# Patient Record
Sex: MALE | Race: WHITE | Employment: FULL TIME | URBAN - METROPOLITAN AREA
[De-identification: names, ages, dates, MRNs, and addresses within clinical notes are randomized per-mention and may not be internally consistent; named-entity substitution may affect disease eponyms.]

---

## 2017-02-03 ENCOUNTER — OFFICE VISIT (OUTPATIENT)
Dept: INTERNAL MEDICINE CLINIC | Age: 62
End: 2017-02-03

## 2017-02-03 VITALS
HEIGHT: 74 IN | BODY MASS INDEX: 33.62 KG/M2 | SYSTOLIC BLOOD PRESSURE: 138 MMHG | DIASTOLIC BLOOD PRESSURE: 80 MMHG | OXYGEN SATURATION: 98 % | WEIGHT: 262 LBS | HEART RATE: 67 BPM

## 2017-02-03 DIAGNOSIS — E11.9 TYPE 2 DIABETES MELLITUS WITHOUT COMPLICATION, WITHOUT LONG-TERM CURRENT USE OF INSULIN (HCC): ICD-10-CM

## 2017-02-03 DIAGNOSIS — E78.5 HYPERLIPIDEMIA, UNSPECIFIED HYPERLIPIDEMIA TYPE: ICD-10-CM

## 2017-02-03 DIAGNOSIS — G47.33 OSA ON CPAP: ICD-10-CM

## 2017-02-03 DIAGNOSIS — I82.492 ACUTE DEEP VEIN THROMBOSIS (DVT) OF OTHER SPECIFIED VEIN OF LEFT LOWER EXTREMITY (HCC): Primary | ICD-10-CM

## 2017-02-03 DIAGNOSIS — E66.9 OBESITY (BMI 30-39.9): ICD-10-CM

## 2017-02-03 DIAGNOSIS — Z99.89 OSA ON CPAP: ICD-10-CM

## 2017-02-03 PROCEDURE — 99214 OFFICE O/P EST MOD 30 MIN: CPT | Performed by: INTERNAL MEDICINE

## 2017-05-02 ENCOUNTER — OFFICE VISIT (OUTPATIENT)
Dept: INTERNAL MEDICINE CLINIC | Age: 62
End: 2017-05-02

## 2017-05-02 VITALS
WEIGHT: 264 LBS | DIASTOLIC BLOOD PRESSURE: 78 MMHG | BODY MASS INDEX: 33.88 KG/M2 | HEART RATE: 79 BPM | HEIGHT: 74 IN | SYSTOLIC BLOOD PRESSURE: 122 MMHG | OXYGEN SATURATION: 98 %

## 2017-05-02 DIAGNOSIS — Z13.9 SCREENING: ICD-10-CM

## 2017-05-02 DIAGNOSIS — E66.9 OBESITY (BMI 30-39.9): ICD-10-CM

## 2017-05-02 DIAGNOSIS — E11.9 TYPE 2 DIABETES MELLITUS WITHOUT COMPLICATION, WITHOUT LONG-TERM CURRENT USE OF INSULIN (HCC): ICD-10-CM

## 2017-05-02 DIAGNOSIS — I82.492 ACUTE DEEP VEIN THROMBOSIS (DVT) OF OTHER SPECIFIED VEIN OF LEFT LOWER EXTREMITY (HCC): Primary | ICD-10-CM

## 2017-05-02 DIAGNOSIS — E78.5 HYPERLIPIDEMIA, UNSPECIFIED HYPERLIPIDEMIA TYPE: ICD-10-CM

## 2017-05-02 PROCEDURE — 99214 OFFICE O/P EST MOD 30 MIN: CPT | Performed by: INTERNAL MEDICINE

## 2017-05-24 DIAGNOSIS — R73.01 IFG (IMPAIRED FASTING GLUCOSE): ICD-10-CM

## 2017-05-24 RX ORDER — APIXABAN 5 MG/1
TABLET, FILM COATED ORAL
Qty: 60 TABLET | Refills: 0 | Status: SHIPPED | OUTPATIENT
Start: 2017-05-24 | End: 2018-05-15 | Stop reason: ALTCHOICE

## 2017-10-24 ENCOUNTER — OFFICE VISIT (OUTPATIENT)
Dept: INTERNAL MEDICINE CLINIC | Age: 62
End: 2017-10-24

## 2017-10-24 VITALS
DIASTOLIC BLOOD PRESSURE: 68 MMHG | WEIGHT: 245 LBS | OXYGEN SATURATION: 98 % | SYSTOLIC BLOOD PRESSURE: 136 MMHG | HEART RATE: 58 BPM | BODY MASS INDEX: 31.44 KG/M2 | HEIGHT: 74 IN

## 2017-10-24 DIAGNOSIS — I82.492 ACUTE DEEP VEIN THROMBOSIS (DVT) OF OTHER SPECIFIED VEIN OF LEFT LOWER EXTREMITY (HCC): ICD-10-CM

## 2017-10-24 DIAGNOSIS — Z23 NEED FOR IMMUNIZATION AGAINST INFLUENZA: ICD-10-CM

## 2017-10-24 DIAGNOSIS — E11.9 TYPE 2 DIABETES MELLITUS WITHOUT COMPLICATION, WITHOUT LONG-TERM CURRENT USE OF INSULIN (HCC): Primary | ICD-10-CM

## 2017-10-24 DIAGNOSIS — E78.5 HYPERLIPIDEMIA, UNSPECIFIED HYPERLIPIDEMIA TYPE: ICD-10-CM

## 2017-10-24 DIAGNOSIS — G47.33 OSA ON CPAP: ICD-10-CM

## 2017-10-24 DIAGNOSIS — E66.9 CLASS 1 OBESITY WITHOUT SERIOUS COMORBIDITY WITH BODY MASS INDEX (BMI) OF 31.0 TO 31.9 IN ADULT, UNSPECIFIED OBESITY TYPE: ICD-10-CM

## 2017-10-24 DIAGNOSIS — Z99.89 OSA ON CPAP: ICD-10-CM

## 2017-10-24 PROCEDURE — 90688 IIV4 VACCINE SPLT 0.5 ML IM: CPT | Performed by: INTERNAL MEDICINE

## 2017-10-24 PROCEDURE — 99214 OFFICE O/P EST MOD 30 MIN: CPT | Performed by: INTERNAL MEDICINE

## 2017-10-24 PROCEDURE — 90471 IMMUNIZATION ADMIN: CPT | Performed by: INTERNAL MEDICINE

## 2017-10-24 ASSESSMENT — PATIENT HEALTH QUESTIONNAIRE - PHQ9
SUM OF ALL RESPONSES TO PHQ9 QUESTIONS 1 & 2: 0
SUM OF ALL RESPONSES TO PHQ QUESTIONS 1-9: 0
2. FEELING DOWN, DEPRESSED OR HOPELESS: 0
1. LITTLE INTEREST OR PLEASURE IN DOING THINGS: 0

## 2017-10-24 NOTE — PROGRESS NOTES
Subjective:      Patient ID: Helena Ford is a 60 y.o. male. CC: Recheck diabetes  HPI: Patient with history of DVT now off Eliquis, DAQUAN on CPAP, DM 2, obesity. Obesity has improved with 19 pound weight loss since last office visit 5/2/2017. Concerning diabetes glucose fingerstick in the morning fasting before breakfast average 120 last  Medicines and allergies reviewed  Health maintenance review  Reviewed laboratory data 10/21/2017: Chemistry panel: Glucose: 111. Lipid panel: Total cholesterol: 169. LDL cholesterol: 107. Hemoglobin A1 C 5.4. PSA: 0.4. Review of Systems    Review of Systems   Constitutional: negative   HENT: negative   EYES: negative   Respiratory: negative   Gastrointestinal: negative   Endocrine: negative   Musculoskeletal: negative   Skin: negative   Allergic/Immunological: negative   Hematological: negative   Psychiatric/Behavorial: negative   CV: negative   CNS: negative   :Negative   S/E:Negative  Renal: Negative      Objective:   Physical Exam: Lungs: Clear to auscultation. CV: S1-S2 normal.  ARSALAN. Carotid: No bruit. Head/neck: Neck: No lymphadenopathy. Thyroid: Not palpable. Eyes: EOMI, PERRLA without nystagnus. Spine/extremities: Left lower extremity with mild edema. Skin: No rash. Diabetic foot exam: Pulses easily palpable. No edema. No open area. Light touch and monofilament test normal.  Blood pressure 136/68, pulse 58, height 6' 2.25\" (1.886 m), weight 245 lb (111.1 kg), SpO2 98 %. Assessment:      1.  DM 2: Improved  2. Obesity: Weight loss: Improved as noted above  3. DAQUAN on CPAP: Stable  4. DVT  has been treated with Eliquis now off the medicine and doing well. Plan:     1. Influenza vaccine given today after discussing risk and benefits  2. Microalbumin obtained  3. Continue low sugar, low carb, weight reduction diet  4.   Gave slip to obtain fasting laboratory studies before next office visit  Return follow-up  Dr. penny

## 2017-12-13 ENCOUNTER — OFFICE VISIT (OUTPATIENT)
Dept: PULMONOLOGY | Age: 62
End: 2017-12-13

## 2017-12-13 VITALS
TEMPERATURE: 97.3 F | SYSTOLIC BLOOD PRESSURE: 120 MMHG | OXYGEN SATURATION: 99 % | HEART RATE: 63 BPM | DIASTOLIC BLOOD PRESSURE: 70 MMHG | BODY MASS INDEX: 30.16 KG/M2 | WEIGHT: 235 LBS | HEIGHT: 74 IN | RESPIRATION RATE: 16 BRPM

## 2017-12-13 DIAGNOSIS — G47.33 OSA ON CPAP: Primary | ICD-10-CM

## 2017-12-13 DIAGNOSIS — Z99.89 OSA ON CPAP: Primary | ICD-10-CM

## 2017-12-13 DIAGNOSIS — Z71.89 CPAP USE COUNSELING: ICD-10-CM

## 2017-12-13 PROCEDURE — 99213 OFFICE O/P EST LOW 20 MIN: CPT | Performed by: NURSE PRACTITIONER

## 2017-12-13 ASSESSMENT — SLEEP AND FATIGUE QUESTIONNAIRES
HOW LIKELY ARE YOU TO NOD OFF OR FALL ASLEEP WHILE LYING DOWN TO REST IN THE AFTERNOON WHEN CIRCUMSTANCES PERMIT: 3
HOW LIKELY ARE YOU TO NOD OFF OR FALL ASLEEP WHEN YOU ARE A PASSENGER IN A CAR FOR AN HOUR WITHOUT A BREAK: 0
HOW LIKELY ARE YOU TO NOD OFF OR FALL ASLEEP WHILE SITTING QUIETLY AFTER LUNCH WITHOUT ALCOHOL: 0
ESS TOTAL SCORE: 3
NECK CIRCUMFERENCE (INCHES): 17.5
HOW LIKELY ARE YOU TO NOD OFF OR FALL ASLEEP IN A CAR, WHILE STOPPED FOR A FEW MINUTES IN TRAFFIC: 0
HOW LIKELY ARE YOU TO NOD OFF OR FALL ASLEEP WHILE WATCHING TV: 0
HOW LIKELY ARE YOU TO NOD OFF OR FALL ASLEEP WHILE SITTING INACTIVE IN A PUBLIC PLACE: 0
HOW LIKELY ARE YOU TO NOD OFF OR FALL ASLEEP WHILE SITTING AND TALKING TO SOMEONE: 0
HOW LIKELY ARE YOU TO NOD OFF OR FALL ASLEEP WHILE SITTING AND READING: 0

## 2017-12-13 NOTE — PATIENT INSTRUCTIONS
Please keep all of your future appointments scheduled by [address], [facility] Pulmonary and Sleep. You should have a follow up appointment scheduled with a sleep medicine provider within 12 months. Routine parts, masks, tubing and filters should all be obtainable from your DME (equipment) provider. Any problems related to mask fit, comfort or functioning of equipment should also be addressed directly with your DME provider. If you are unable to resolve problems, then please notify our office and schedule an appointment to be seen sooner than the usual follow up appointment. For all patients who are evaluated for sleep disorders, never drive or operate motorized equipment while sleepy, fatigued or groggy. Please bring in all of your sleep equipment to all of your appointments, including machine, mask, cords and hoses. Here are some tips to to getting better sleep  1- Avoid napping during the day: This will ensure you are tired at bedtime. If you have to take a nap, sleep less than one hour, before 3 pm.   2- Exercise regularly, but not right before bed: but the timing of the workout is important. Exercising in the morning or early afternoon will not interfere with sleep. Exercising within two hours before bedtime can decrease your ability to fall asleep. Regular exercise is recommended to help you deepen the sleep. 3- Avoid heavy, spicy, or sugary foods 4-6 hours before bedtime: These can affect your ability to stay asleep. 4- Have a light snack before bed: Having an empty stomach can interfere with your sleep. Dairy products and turkey contain tryptophan, which acts as a natural sleep inducer. 5- Stay away from caffeine, nicotine and alcohol at least 4-6 hours before bed: Caffeine and nicotine are stimulants that interfere with your ability to fall asleep.  While alcohol has an immediate sleep-inducing effect, a few hours later, as alcohol levels in your blood start to fall, there is a stimulant effect and you will experience fragmented sleep. 6- Take a hot bath 90 minutes before bedtime:  A hot bath will raise your body temperature, but it is the drop in body temperature that may leave you feeling sleepy  7- Develop sleep rituals: it is important to give your body cues that it is time to slow down and sleep. Listen to relaxing music, read something soothing for 15 minutes, have a cup of caffeine free tea, or do relaxation exercises such as yoga or deep breathing help relieve anxiety and reduce muscle tension. 8- Fix a bedtime and an awakening time: Even on weekends! When your sleep cycle has a regular rhythm, you will feel better. 9- Sleep only when sleepy: This reduces the time you are awake in bed. 10- Get into your favorite sleeping position: If you can't fall asleep within 15-30 minutes, get up and do something boring until you feel sleepy. Sit quietly in the dark or read the warranty on your refrigerator. Don't expose yourself to bright light while you are up, it gives cues to your brain that it is time to wake up. 11- Only use your bed for sleeping: Dont use the bed as an office, workroom or recreation room. Let your body \"know\" that the bed is associated with sleeping  12- Use comfortable bedding. Uncomfortable bedding can prevent good sleep. Evaluate whether or not this is a source of your problem, and make appropriate changes. 13- Make sure your bed and bedroom are quiet and comfortable: A hot room can be uncomfortable. A cooler room, along with enough blankets to stay warm is recommended. Get a blackout shade or wear a slumber mask and wear earplugs or get a \"white noise\" machine for light and noise distractions. 14- Use sunlight to set your biological clock: When you get up in the morning, go outside and turn your face to the sun for 15 minutes. 13- Dont take your worries to bed: Leave worries about job, school, daily life, etc., behind when you go to bed.  Some people find it useful to assign a \"worry period\" during the evening or afternoon for these issues. CPAP Equipment Cleaning and Disinfecting Schedule  Equipment Cleaning Frequency Instructions  Disinfecting Frequency   Non-Disposable Filters  Weekly Mild soapy water, Rinse, Air Dry Not Required   Disposable Filters Change as needed  2-4 weeks Do Not Wash Not Required   Hose/tubing Daily Mild soapy water, Rinse, Air Dry Once a week   Mask / Nasal Pillows Daily Mild soapy water, Rinse, Air Dry Once a week   Headgear Weekly Hand wash, Mild soapy water, Rinse, Dry  Not Required   Humidifier Daily Empty water daily  Mild soapy water, Rinse well, Air Dry  Once a week   CPAP Unit As Needed Dust with damp cloth,  No detergents or sprays Not Required         Disinfect (per schedule) with 1 part white vinegar and 3 parts water- soak mask and water chamber for 30 minutes every 1-2 weeks, more often if sick. Allow water/vinegar mixture to run through tubing. Allow all equipment to air dry. Drying Hints:   Always hang tubing away from direct sunlight, as this will cause the tubing to become yellow, brittle and crack over a period of time. DO NOT attach the wet tubing to your CPAP unit to blow-dry it. The moisture from the tubing can drain back into your machine. Moisture in your unit can cause sudden pressure increases or short circuits  DO's and DON'Ts:  - Don't use alcohol-based products to clean your mask, because it can cause the materials to become hard and brittle. - Don't put headgear in the washer or dryer  - Don't use any caustic or household cleaning solutions such as bleach on your CPAP   equipment.  - Do follow the recommended cleaning schedule. - Do change your disposable filter frequently. Adapted From: [email or web address]. shtm.   These are general suggestions for all models please follow specific s recommendations and specific instructions

## 2018-05-15 ENCOUNTER — OFFICE VISIT (OUTPATIENT)
Dept: INTERNAL MEDICINE CLINIC | Age: 63
End: 2018-05-15

## 2018-05-15 VITALS
BODY MASS INDEX: 30.48 KG/M2 | SYSTOLIC BLOOD PRESSURE: 126 MMHG | DIASTOLIC BLOOD PRESSURE: 64 MMHG | OXYGEN SATURATION: 98 % | WEIGHT: 239 LBS | HEART RATE: 74 BPM

## 2018-05-15 DIAGNOSIS — Z12.5 SPECIAL SCREENING FOR MALIGNANT NEOPLASM OF PROSTATE: ICD-10-CM

## 2018-05-15 DIAGNOSIS — E78.5 HYPERLIPIDEMIA, UNSPECIFIED HYPERLIPIDEMIA TYPE: ICD-10-CM

## 2018-05-15 DIAGNOSIS — E66.9 CLASS 1 OBESITY WITHOUT SERIOUS COMORBIDITY WITH BODY MASS INDEX (BMI) OF 31.0 TO 31.9 IN ADULT, UNSPECIFIED OBESITY TYPE: ICD-10-CM

## 2018-05-15 DIAGNOSIS — E11.9 TYPE 2 DIABETES MELLITUS WITHOUT COMPLICATION, WITHOUT LONG-TERM CURRENT USE OF INSULIN (HCC): Primary | ICD-10-CM

## 2018-05-15 DIAGNOSIS — G47.33 OSA ON CPAP: ICD-10-CM

## 2018-05-15 DIAGNOSIS — Z99.89 OSA ON CPAP: ICD-10-CM

## 2018-05-15 LAB
CREATININE URINE POCT: NORMAL
MICROALBUMIN/CREAT 24H UR: 20 MG/G{CREAT}
MICROALBUMIN/CREAT UR-RTO: NORMAL

## 2018-05-15 PROCEDURE — 3044F HG A1C LEVEL LT 7.0%: CPT | Performed by: INTERNAL MEDICINE

## 2018-05-15 PROCEDURE — G8417 CALC BMI ABV UP PARAM F/U: HCPCS | Performed by: INTERNAL MEDICINE

## 2018-05-15 PROCEDURE — 82044 UR ALBUMIN SEMIQUANTITATIVE: CPT | Performed by: INTERNAL MEDICINE

## 2018-05-15 PROCEDURE — 99213 OFFICE O/P EST LOW 20 MIN: CPT | Performed by: INTERNAL MEDICINE

## 2018-05-15 PROCEDURE — 2022F DILAT RTA XM EVC RTNOPTHY: CPT | Performed by: INTERNAL MEDICINE

## 2018-05-15 PROCEDURE — 3017F COLORECTAL CA SCREEN DOC REV: CPT | Performed by: INTERNAL MEDICINE

## 2018-05-15 PROCEDURE — 1036F TOBACCO NON-USER: CPT | Performed by: INTERNAL MEDICINE

## 2018-05-15 PROCEDURE — G8427 DOCREV CUR MEDS BY ELIG CLIN: HCPCS | Performed by: INTERNAL MEDICINE

## 2018-05-15 RX ORDER — MULTIVITAMIN WITH IRON
250 TABLET ORAL DAILY
COMMUNITY

## 2018-06-28 ENCOUNTER — TELEPHONE (OUTPATIENT)
Dept: INTERNAL MEDICINE CLINIC | Age: 63
End: 2018-06-28

## 2018-06-28 DIAGNOSIS — E11.9 TYPE 2 DIABETES MELLITUS WITHOUT COMPLICATION, WITHOUT LONG-TERM CURRENT USE OF INSULIN (HCC): ICD-10-CM

## 2019-01-07 ENCOUNTER — OFFICE VISIT (OUTPATIENT)
Dept: INTERNAL MEDICINE CLINIC | Age: 64
End: 2019-01-07
Payer: COMMERCIAL

## 2019-01-07 VITALS
DIASTOLIC BLOOD PRESSURE: 72 MMHG | HEART RATE: 73 BPM | BODY MASS INDEX: 32.98 KG/M2 | SYSTOLIC BLOOD PRESSURE: 124 MMHG | OXYGEN SATURATION: 98 % | HEIGHT: 74 IN | WEIGHT: 257 LBS

## 2019-01-07 DIAGNOSIS — E66.9 CLASS 1 OBESITY WITHOUT SERIOUS COMORBIDITY WITH BODY MASS INDEX (BMI) OF 31.0 TO 31.9 IN ADULT, UNSPECIFIED OBESITY TYPE: ICD-10-CM

## 2019-01-07 DIAGNOSIS — Z23 NEED FOR PROPHYLACTIC VACCINATION AND INOCULATION AGAINST INFLUENZA: ICD-10-CM

## 2019-01-07 DIAGNOSIS — Z99.89 OSA ON CPAP: ICD-10-CM

## 2019-01-07 DIAGNOSIS — E11.9 TYPE 2 DIABETES MELLITUS WITHOUT COMPLICATION, WITHOUT LONG-TERM CURRENT USE OF INSULIN (HCC): Primary | ICD-10-CM

## 2019-01-07 DIAGNOSIS — G47.33 OSA ON CPAP: ICD-10-CM

## 2019-01-07 DIAGNOSIS — E78.5 HYPERLIPIDEMIA, UNSPECIFIED HYPERLIPIDEMIA TYPE: ICD-10-CM

## 2019-01-07 PROCEDURE — 2022F DILAT RTA XM EVC RTNOPTHY: CPT | Performed by: INTERNAL MEDICINE

## 2019-01-07 PROCEDURE — 90686 IIV4 VACC NO PRSV 0.5 ML IM: CPT | Performed by: INTERNAL MEDICINE

## 2019-01-07 PROCEDURE — G8417 CALC BMI ABV UP PARAM F/U: HCPCS | Performed by: INTERNAL MEDICINE

## 2019-01-07 PROCEDURE — 3017F COLORECTAL CA SCREEN DOC REV: CPT | Performed by: INTERNAL MEDICINE

## 2019-01-07 PROCEDURE — 90471 IMMUNIZATION ADMIN: CPT | Performed by: INTERNAL MEDICINE

## 2019-01-07 PROCEDURE — G8427 DOCREV CUR MEDS BY ELIG CLIN: HCPCS | Performed by: INTERNAL MEDICINE

## 2019-01-07 PROCEDURE — G8482 FLU IMMUNIZE ORDER/ADMIN: HCPCS | Performed by: INTERNAL MEDICINE

## 2019-01-07 PROCEDURE — 99214 OFFICE O/P EST MOD 30 MIN: CPT | Performed by: INTERNAL MEDICINE

## 2019-01-07 PROCEDURE — 1036F TOBACCO NON-USER: CPT | Performed by: INTERNAL MEDICINE

## 2019-01-07 PROCEDURE — 3044F HG A1C LEVEL LT 7.0%: CPT | Performed by: INTERNAL MEDICINE

## 2019-01-07 ASSESSMENT — PATIENT HEALTH QUESTIONNAIRE - PHQ9
SUM OF ALL RESPONSES TO PHQ QUESTIONS 1-9: 0
2. FEELING DOWN, DEPRESSED OR HOPELESS: 0
1. LITTLE INTEREST OR PLEASURE IN DOING THINGS: 0
SUM OF ALL RESPONSES TO PHQ9 QUESTIONS 1 & 2: 0
SUM OF ALL RESPONSES TO PHQ QUESTIONS 1-9: 0

## 2019-01-08 ENCOUNTER — TELEPHONE (OUTPATIENT)
Dept: PULMONOLOGY | Age: 64
End: 2019-01-08

## 2019-07-08 ENCOUNTER — OFFICE VISIT (OUTPATIENT)
Dept: INTERNAL MEDICINE CLINIC | Age: 64
End: 2019-07-08
Payer: COMMERCIAL

## 2019-07-08 VITALS
HEART RATE: 63 BPM | SYSTOLIC BLOOD PRESSURE: 122 MMHG | HEIGHT: 73 IN | OXYGEN SATURATION: 98 % | BODY MASS INDEX: 32.74 KG/M2 | DIASTOLIC BLOOD PRESSURE: 78 MMHG | WEIGHT: 247 LBS

## 2019-07-08 DIAGNOSIS — E11.9 TYPE 2 DIABETES MELLITUS WITHOUT COMPLICATION, WITHOUT LONG-TERM CURRENT USE OF INSULIN (HCC): ICD-10-CM

## 2019-07-08 DIAGNOSIS — E78.5 HYPERLIPIDEMIA, UNSPECIFIED HYPERLIPIDEMIA TYPE: Primary | ICD-10-CM

## 2019-07-08 PROCEDURE — 99213 OFFICE O/P EST LOW 20 MIN: CPT | Performed by: NURSE PRACTITIONER

## 2019-07-08 ASSESSMENT — ENCOUNTER SYMPTOMS
CHEST TIGHTNESS: 0
ALLERGIC/IMMUNOLOGIC NEGATIVE: 1
VOMITING: 0
NAUSEA: 0
SHORTNESS OF BREATH: 0
CONSTIPATION: 0
DIARRHEA: 0
ABDOMINAL PAIN: 0
COUGH: 0

## 2019-08-02 ENCOUNTER — TELEPHONE (OUTPATIENT)
Dept: INTERNAL MEDICINE CLINIC | Age: 64
End: 2019-08-02

## 2019-08-02 ENCOUNTER — HOSPITAL ENCOUNTER (OUTPATIENT)
Dept: VASCULAR LAB | Age: 64
Discharge: HOME | End: 2019-08-02
Payer: COMMERCIAL

## 2019-08-02 ENCOUNTER — OFFICE VISIT (OUTPATIENT)
Dept: INTERNAL MEDICINE CLINIC | Age: 64
End: 2019-08-02
Payer: COMMERCIAL

## 2019-08-02 VITALS
SYSTOLIC BLOOD PRESSURE: 138 MMHG | HEART RATE: 66 BPM | BODY MASS INDEX: 32.74 KG/M2 | WEIGHT: 247 LBS | DIASTOLIC BLOOD PRESSURE: 76 MMHG | RESPIRATION RATE: 14 BRPM | HEIGHT: 73 IN | OXYGEN SATURATION: 98 %

## 2019-08-02 DIAGNOSIS — I82.492 ACUTE DEEP VEIN THROMBOSIS (DVT) OF OTHER SPECIFIED VEIN OF LEFT LOWER EXTREMITY (HCC): Primary | ICD-10-CM

## 2019-08-02 DIAGNOSIS — Z86.718 H/O DEEP VENOUS THROMBOSIS: Primary | ICD-10-CM

## 2019-08-02 DIAGNOSIS — M25.562 PAIN AND SWELLING OF LEFT KNEE: ICD-10-CM

## 2019-08-02 DIAGNOSIS — Z86.718 H/O DEEP VENOUS THROMBOSIS: ICD-10-CM

## 2019-08-02 DIAGNOSIS — M25.462 PAIN AND SWELLING OF LEFT KNEE: ICD-10-CM

## 2019-08-02 PROCEDURE — 93971 EXTREMITY STUDY: CPT

## 2019-08-02 PROCEDURE — 99213 OFFICE O/P EST LOW 20 MIN: CPT | Performed by: NURSE PRACTITIONER

## 2019-08-02 ASSESSMENT — ENCOUNTER SYMPTOMS
RESPIRATORY NEGATIVE: 1
ALLERGIC/IMMUNOLOGIC NEGATIVE: 1
BACK PAIN: 0
EYES NEGATIVE: 1
GASTROINTESTINAL NEGATIVE: 1

## 2019-08-02 NOTE — PROGRESS NOTES
needs: Medical: Not on file     Non-medical: Not on file   Tobacco Use    Smoking status: Never Smoker    Smokeless tobacco: Never Used   Substance and Sexual Activity    Alcohol use: Yes     Comment: 4 / month    Drug use: No    Sexual activity: Not on file   Lifestyle    Physical activity:     Days per week: Not on file     Minutes per session: Not on file    Stress: Not on file   Relationships    Social connections:     Talks on phone: Not on file     Gets together: Not on file     Attends Muslim service: Not on file     Active member of club or organization: Not on file     Attends meetings of clubs or organizations: Not on file     Relationship status: Not on file    Intimate partner violence:     Fear of current or ex partner: Not on file     Emotionally abused: Not on file     Physically abused: Not on file     Forced sexual activity: Not on file   Other Topics Concern    Not on file   Social History Narrative    Not on file       Review of Systems   Constitutional: Negative. HENT: Negative. Eyes: Negative. Respiratory: Negative. Cardiovascular: Negative. Gastrointestinal: Negative. Endocrine: Negative. Genitourinary: Negative. Musculoskeletal: Positive for arthralgias. Negative for back pain, gait problem, joint swelling, myalgias, neck pain and neck stiffness. Pain in his left knee   Skin: Negative. Allergic/Immunologic: Negative. Neurological: Negative. Hematological: Negative. Psychiatric/Behavioral: Negative. Objective:   Physical Exam   Constitutional: He is oriented to person, place, and time. He appears well-developed and well-nourished. No distress. HENT:   Head: Normocephalic and atraumatic. Neck: Normal range of motion. No JVD present. No tracheal deviation present. No thyromegaly present. Cardiovascular: Normal rate, regular rhythm, normal heart sounds and intact distal pulses. Exam reveals no gallop and no friction rub. No murmur heard. Pulmonary/Chest: Effort normal and breath sounds normal. No stridor. No respiratory distress. He has no wheezes. He has no rales. He exhibits no tenderness. Abdominal: Soft. Bowel sounds are normal. He exhibits no distension and no mass. There is no tenderness. There is no rebound and no guarding. No hernia. Musculoskeletal: Normal range of motion. He exhibits edema, tenderness and deformity. Mild swelling at left knee and tenderness upon palpation   Lymphadenopathy:     He has no cervical adenopathy. Neurological: He is alert and oriented to person, place, and time. He displays normal reflexes. No cranial nerve deficit or sensory deficit. He exhibits normal muscle tone. Coordination normal.   Skin: Skin is warm and dry. No rash noted. He is not diaphoretic. No erythema. No pallor. Psychiatric: He has a normal mood and affect. His behavior is normal. Judgment and thought content normal.       Assessment:      1. H/O deep venous thrombosis  Will follow up with results of doppler study    - US DUP LOWER EXTREMITY LEFT TOBY; Future    2. Pain and swelling of left knee  Continue with ibuprofen and Ice for inflammation  Possible xray if no improvement  Referral to ortho if no improvement. - US DUP LOWER EXTREMITY LEFT TOBY;  Future        Plan:      See above plan    Follow up in 1 week        SANTIAGO Baker - CNP

## 2019-08-09 ENCOUNTER — OFFICE VISIT (OUTPATIENT)
Dept: INTERNAL MEDICINE CLINIC | Age: 64
End: 2019-08-09
Payer: COMMERCIAL

## 2019-08-09 VITALS
SYSTOLIC BLOOD PRESSURE: 138 MMHG | RESPIRATION RATE: 14 BRPM | HEART RATE: 72 BPM | BODY MASS INDEX: 32.47 KG/M2 | WEIGHT: 245 LBS | OXYGEN SATURATION: 98 % | HEIGHT: 73 IN | DIASTOLIC BLOOD PRESSURE: 70 MMHG

## 2019-08-09 DIAGNOSIS — M25.562 ACUTE PAIN OF LEFT KNEE: ICD-10-CM

## 2019-08-09 DIAGNOSIS — I82.492 ACUTE DEEP VEIN THROMBOSIS (DVT) OF OTHER SPECIFIED VEIN OF LEFT LOWER EXTREMITY (HCC): Primary | ICD-10-CM

## 2019-08-09 DIAGNOSIS — M25.362 KNEE INSTABILITY, LEFT: ICD-10-CM

## 2019-08-09 PROCEDURE — 99213 OFFICE O/P EST LOW 20 MIN: CPT | Performed by: NURSE PRACTITIONER

## 2019-08-09 NOTE — PROGRESS NOTES
normal. No scleral icterus. Neck: Normal range of motion. Neck supple. Cardiovascular: Normal rate, regular rhythm and normal heart sounds. Pulmonary/Chest: Effort normal and breath sounds normal. No respiratory distress. Abdominal: Soft. Normal appearance and bowel sounds are normal. There is no hepatosplenomegaly. There is no CVA tenderness. Musculoskeletal: Normal range of motion. Left knee: He exhibits swelling. He exhibits no erythema and normal patellar mobility. Tenderness found. Medial joint line tenderness noted. Neurological: He is alert and oriented to person, place, and time. Skin: Skin is warm, dry and intact. Psychiatric: He has a normal mood and affect. His speech is normal and behavior is normal. Judgment and thought content normal.   Vitals reviewed. ASSESSMENT/PLAN:    1. Acute deep vein thrombosis (DVT) of other specified vein of left lower extremity ([address]  Continue with Eliquis as prescribed. Refill prescription. Follow up as scheduled. Please refer to educational handout. 2. Acute pain of left knee  Elevate extremity. Medication as prescribed. Tylenol arthritis as needed for pain. Referral to Ortho. - Nahid Marcano MD, Orthopedic Surgery (Sports,Knee), [place]    3. Knee instability, left  Ortho referral.  See above plan. - Nahid Marcano MD, Orthopedic Surgery (Sports,Knee) [facility]      Return in about 1 month (around 9/9/2019) for DVT.

## 2019-08-17 ASSESSMENT — ENCOUNTER SYMPTOMS
ABDOMINAL PAIN: 0
SHORTNESS OF BREATH: 0
CONSTIPATION: 0
COUGH: 0
VOMITING: 0
DIARRHEA: 0
CHEST TIGHTNESS: 0
ALLERGIC/IMMUNOLOGIC NEGATIVE: 1
NAUSEA: 0

## 2019-08-26 ENCOUNTER — OFFICE VISIT (OUTPATIENT)
Dept: ORTHOPEDIC SURGERY | Age: 64
End: 2019-08-26
Payer: COMMERCIAL

## 2019-08-26 VITALS — BODY MASS INDEX: 32.47 KG/M2 | WEIGHT: 245 LBS | HEIGHT: 73 IN

## 2019-08-26 DIAGNOSIS — M25.562 LEFT KNEE PAIN, UNSPECIFIED CHRONICITY: Primary | ICD-10-CM

## 2019-08-26 DIAGNOSIS — M17.12 PATELLOFEMORAL ARTHRITIS OF LEFT KNEE: ICD-10-CM

## 2019-08-26 PROCEDURE — 99203 OFFICE O/P NEW LOW 30 MIN: CPT | Performed by: ORTHOPAEDIC SURGERY

## 2019-08-26 PROCEDURE — 20610 DRAIN/INJ JOINT/BURSA W/O US: CPT | Performed by: ORTHOPAEDIC SURGERY

## 2019-08-26 NOTE — PROGRESS NOTES
[place] Cortisone injection     Betamethasone   NDC# 1897-7520-36  Lot# [id number]  Expiration date 5/2020    Sensorcaine . 25%  NDC# 44397-374-13  Lot# [id number]  Expiration date 12/22    Site L KNEE

## 2019-08-30 ENCOUNTER — HOSPITAL ENCOUNTER (OUTPATIENT)
Dept: PHYSICAL THERAPY | Age: 64
Setting detail: THERAPIES SERIES
Discharge: HOME | End: 2019-08-30
Payer: COMMERCIAL

## 2019-08-30 PROCEDURE — 97161 PT EVAL LOW COMPLEX 20 MIN: CPT | Performed by: PHYSICAL THERAPIST

## 2019-08-30 PROCEDURE — 97110 THERAPEUTIC EXERCISES: CPT | Performed by: PHYSICAL THERAPIST

## 2019-08-30 PROCEDURE — 97016 VASOPNEUMATIC DEVICE THERAPY: CPT | Performed by: PHYSICAL THERAPIST

## 2019-08-30 NOTE — PLAN OF CARE
Scale: 8/10  Easing factors: rest, ice, NSAIDs  Provocative factors: descending stairs, driving, sleeping, positioning     Type: []Constant   [x]Intermittent  []Radiating [x]Localized []other:     Numbness/Tingling: none. Occasional \"locking\"     Occupation/School:  (standing 8 hours a day)    Living Status/Prior Level of Function: Independent with ADLs and IADLs, hiking    OBJECTIVE:     ROM LEFT RIGHT   HIP Flex     HIP Abd     HIP Ext     HIP IR     HIP ER     Knee ext 0 0   Knee Flex 132 134   Ankle PF     Ankle DF     Ankle In     Ankle Ev     Strength  LEFT RIGHT   HIP Flexors 4 5   HIP Abductors 4- 5   HIP Ext     Hip ER 4 5   Knee EXT (quad) 4- 5   Knee Flex (HS) 4 5   Ankle DF     Ankle PF     Ankle Inv     Ankle EV          Circumference  Mid apex  7 cm prox             Reflexes/Sensation:    [x]Dermatomes/Myotomes intact    [x]Reflexes equal and normal bilaterally   []Other:    Joint mobility: NT   []Normal    []Hypo   []Hyper    Palpation: nontender    Functional Mobility/Transfers: I with transfers    Posture: quad atrophy on L compared to R, poor QS    Bandages/Dressings/Incisions: na    Gait: (include devices/WB status) ambulates with decreased TKE    Orthopedic Special Tests: na                       [x] Patient history, allergies, meds reviewed. Medical chart reviewed. See intake form. Review Of Systems (ROS):  [x]Performed Review of systems (Integumentary, CardioPulmonary, Neurological) by intake and observation. Intake form has been scanned into medical record. Patient has been instructed to contact their primary care physician regarding ROS issues if not already being addressed at this time.       Co-morbidities/Complexities (which will affect course of rehabilitation):   []None           Arthritic conditions   []Rheumatoid arthritis (M05.9)  []Osteoarthritis (M19.91)   Cardiovascular conditions   []Hypertension (I10)  []Hyperlipidemia (E78.5)  []Angina pectoris driving and/or computer work   [x]Reduced ability to perform lifting, carrying tasks   [x]Reduced ability to squat   [x]Reduced ability to forward bend   [x]Reduced ability to ambulate prolonged functional periods/distances/surfaces   [x]Reduced ability to ascend/descend stairs   [x]Reduced ability to run, hop, cut or jump   []other:    Participation Restrictions   []Reduced participation in self care activities   [x]Reduced participation in home management activities   [x]Reduced participation in work activities   [x]Reduced participation in social activities. [x]Reduced participation in sport/recreation activities. Classification :    []Signs/symptoms consistent with post-surgical status including decreased ROM, strength and function.    []Signs/symptoms consistent with joint sprain/strain   [x]Signs/symptoms consistent with patella-femoral syndrome   [x]Signs/symptoms consistent with knee OA/hip OA   []Signs/symptoms consistent with internal derangement of knee/Hip   []Signs/symptoms consistent with functional hip weakness/NMR control      []Signs/symptoms consistent with tendinitis/tendinosis    []signs/symptoms consistent with pathology which may benefit from Dry needling      []other:      Prognosis/Rehab Potential:      []Excellent   [x]Good    []Fair   []Poor    Tolerance of evaluation/treatment:    []Excellent   [x]Good    []Fair   []Poor    Physical Therapy Evaluation Complexity Justification  [x] A history of present problem with:  [] no personal factors and/or comorbidities that impact the plan of care;  [x]1-2 personal factors and/or comorbidities that impact the plan of care  []3 personal factors and/or comorbidities that impact the plan of care  [x] An examination of body systems using standardized tests and measures addressing any of the following: body structures and functions (impairments), activity limitations, and/or participation restrictions;:  [] a total of 1-2 or more elements   [] a

## 2019-08-30 NOTE — FLOWSHEET NOTE
[facility]    [facility] Therapy Daily Treatment Note  Date:  2019    Patient Name:  Radha Kelly  :  1955  MRN: [id number]  Restrictions/Precautions:    Medical/Treatment Diagnosis Information:  · Diagnosis: PT: L knee patellofemoral OA  · Treatment Diagnosis: L knee pain M17.12/ V63.261  Insurance/Certification information:  PT Insurance Information: [employer]  Physician Information:  Referring Practitioner: Chhaya Jonas of care signed (Y/N):     Date of Patient follow up with Physician:     G-Code (if applicable):      Date G-Code Applied:     LEFS: 45% disability    Progress Note: [x]  Yes  []  No  Next due by: Visit #10       Latex Allergy:  [x]NO      []YES  Preferred Language for Healthcare:   [x]English       []other:    Visit # Insurance Allowable   1 Med Nec     Pain level:  6/10     SUBJECTIVE:  See eval    OBJECTIVE: See eval  Observation:   Test measurements:      RESTRICTIONS/PRECAUTIONS: none    Exercises/Interventions:     Therapeutic Ex Sets/sec Notes HEP   bike 6'     QS  SLR x10  2x10     SSLR  SL clams 2x10  2x10     TKE  Wall squats x20 Purple  x10                                                                             Manual Intervention                                          NMR re-education                                                                Therapeutic Exercise and NMR EXR  [] (17501) Provided verbal/tactile cueing for activities related to strengthening, flexibility, endurance, ROM for improvements in LE, proximal hip, and core control with self care, mobility, lifting, ambulation.  [] (03695) Provided verbal/tactile cueing for activities related to improving balance, coordination, kinesthetic sense, posture, motor skill, proprioception  to assist with LE, proximal hip, and core control in self care, mobility, lifting, ambulation and eccentric single leg control.      NMR and Therapeutic Activities:

## 2019-09-05 ENCOUNTER — HOSPITAL ENCOUNTER (OUTPATIENT)
Dept: PHYSICAL THERAPY | Age: 64
Setting detail: THERAPIES SERIES
Discharge: HOME | End: 2019-09-05
Payer: MEDICARE

## 2019-09-05 PROCEDURE — 97112 NEUROMUSCULAR REEDUCATION: CPT | Performed by: PHYSICAL THERAPIST

## 2019-09-05 PROCEDURE — 97110 THERAPEUTIC EXERCISES: CPT | Performed by: PHYSICAL THERAPIST

## 2019-09-05 PROCEDURE — 97016 VASOPNEUMATIC DEVICE THERAPY: CPT | Performed by: PHYSICAL THERAPIST

## 2019-09-05 NOTE — FLOWSHEET NOTE
(26542) x      [x] VASO  [] Manual (65071) x       [] Other:  [] TA x       [] Mech Traction (21749)  [] ES(attended) (17120)      [] ES (un) (66713):     GOALS:   Patient stated goal: Hike without pain    Therapist goals for Patient:   Short Term Goals: To be achieved in: 2 weeks  1. Independent in HEP and progression per patient tolerance, in order to prevent re-injury. 2. Patient will have a decrease in pain to facilitate improvement in movement, function, and ADLs as indicated by Functional Deficits. Long Term Goals: To be achieved in: 6 weeks  1. Disability index score of 20% or less for the LEFS to assist with reaching prior level of function. 3. Patient will demonstrate an increase in Strength to good proximal hip strength and control, within 5lb HHD in LE to allow for proper functional mobility as indicated by patients Functional Deficits. 4. Patient will return to full functional activities without increased symptoms or restriction including ability to ascend/ descend stairs with reciprocal gait. 5. Patient will be able to walk on even and uneven ground for > 1 hour to return to PLOF of hiking. Progression Towards Functional goals:  [] Patient is progressing as expected towards functional goals listed. [] Progression is slowed due to complexities listed. [] Progression has been slowed due to co-morbidities. [x] Plan just implemented, too soon to assess goals progression  [] Other:     ASSESSMENT:  Progressing to 30 reps of exercises. Added exercises to be completed at gym as patient regularly goes to local gym for workout.     Treatment/Activity Tolerance:  [] Patient tolerated treatment well [] Patient limited by fatique  [] Patient limited by pain  [] Patient limited by other medical complications  [] Other:     Prognosis: [] Good [] Fair  [] Poor    Patient Requires Follow-up: [x] Yes  [] No    PLAN: See eval  [x] Continue per plan of care [] Alter current plan (see comments)  [] Plan of care initiated [] Hold pending MD visit [] Discharge    Electronically signed by: Janine Montes PT

## 2019-09-11 ENCOUNTER — OFFICE VISIT (OUTPATIENT)
Dept: INTERNAL MEDICINE CLINIC | Age: 64
End: 2019-09-11
Payer: MEDICARE

## 2019-09-11 VITALS
BODY MASS INDEX: 32.2 KG/M2 | DIASTOLIC BLOOD PRESSURE: 72 MMHG | RESPIRATION RATE: 16 BRPM | HEIGHT: 73 IN | HEART RATE: 68 BPM | OXYGEN SATURATION: 98 % | SYSTOLIC BLOOD PRESSURE: 128 MMHG | WEIGHT: 243 LBS

## 2019-09-11 DIAGNOSIS — I82.492 ACUTE DEEP VEIN THROMBOSIS (DVT) OF OTHER SPECIFIED VEIN OF LEFT LOWER EXTREMITY (HCC): ICD-10-CM

## 2019-09-11 DIAGNOSIS — M25.562 PAIN AND SWELLING OF LEFT KNEE: ICD-10-CM

## 2019-09-11 DIAGNOSIS — E11.9 TYPE 2 DIABETES MELLITUS WITHOUT COMPLICATION, WITHOUT LONG-TERM CURRENT USE OF INSULIN (HCC): ICD-10-CM

## 2019-09-11 DIAGNOSIS — M25.462 PAIN AND SWELLING OF LEFT KNEE: ICD-10-CM

## 2019-09-11 DIAGNOSIS — Z29.8 NEED FOR PROPHYLACTIC IMMUNOTHERAPY: Primary | ICD-10-CM

## 2019-09-11 DIAGNOSIS — Z12.5 PROSTATE CANCER SCREENING: ICD-10-CM

## 2019-09-11 DIAGNOSIS — E78.5 HYPERLIPIDEMIA, UNSPECIFIED HYPERLIPIDEMIA TYPE: ICD-10-CM

## 2019-09-11 DIAGNOSIS — M25.562 ACUTE PAIN OF LEFT KNEE: ICD-10-CM

## 2019-09-11 PROCEDURE — G0009 ADMIN PNEUMOCOCCAL VACCINE: HCPCS | Performed by: NURSE PRACTITIONER

## 2019-09-11 PROCEDURE — 90653 IIV ADJUVANT VACCINE IM: CPT | Performed by: NURSE PRACTITIONER

## 2019-09-11 PROCEDURE — 99213 OFFICE O/P EST LOW 20 MIN: CPT | Performed by: NURSE PRACTITIONER

## 2019-09-11 PROCEDURE — G0008 ADMIN INFLUENZA VIRUS VAC: HCPCS | Performed by: NURSE PRACTITIONER

## 2019-09-11 PROCEDURE — 90670 PCV13 VACCINE IM: CPT | Performed by: NURSE PRACTITIONER

## 2019-09-11 PROCEDURE — 90715 TDAP VACCINE 7 YRS/> IM: CPT | Performed by: NURSE PRACTITIONER

## 2019-09-11 PROCEDURE — 90472 IMMUNIZATION ADMIN EACH ADD: CPT | Performed by: NURSE PRACTITIONER

## 2019-09-12 ENCOUNTER — HOSPITAL ENCOUNTER (OUTPATIENT)
Dept: PHYSICAL THERAPY | Age: 64
Setting detail: THERAPIES SERIES
Discharge: HOME | End: 2019-09-12
Payer: MEDICARE

## 2019-09-12 PROCEDURE — 97016 VASOPNEUMATIC DEVICE THERAPY: CPT | Performed by: PHYSICAL THERAPIST

## 2019-09-12 PROCEDURE — 97112 NEUROMUSCULAR REEDUCATION: CPT | Performed by: PHYSICAL THERAPIST

## 2019-09-12 PROCEDURE — 97110 THERAPEUTIC EXERCISES: CPT | Performed by: PHYSICAL THERAPIST

## 2019-09-12 NOTE — FLOWSHEET NOTE
cryotherapy for 10 minutes at medium pressure. Charges:  Timed Code Treatment Minutes: 40   Total Treatment Minutes: 50     [] EVAL LOW 51363  [x] ON(79155) x  2   [] IONTO  [x] NMR (45475) x      [x] VASO  [] Manual (09399) x       [] Other:  [] TA x       [] Mech Traction (01465)  [] ES(attended) (35837)      [] ES (un) (97335):     GOALS:   Patient stated goal: Hike without pain    Therapist goals for Patient:   Short Term Goals: To be achieved in: 2 weeks  1. Independent in HEP and progression per patient tolerance, in order to prevent re-injury. 2. Patient will have a decrease in pain to facilitate improvement in movement, function, and ADLs as indicated by Functional Deficits. Long Term Goals: To be achieved in: 6 weeks  1. Disability index score of 20% or less for the LEFS to assist with reaching prior level of function. 3. Patient will demonstrate an increase in Strength to good proximal hip strength and control, within 5lb HHD in LE to allow for proper functional mobility as indicated by patients Functional Deficits. 4. Patient will return to full functional activities without increased symptoms or restriction including ability to ascend/ descend stairs with reciprocal gait. 5. Patient will be able to walk on even and uneven ground for > 1 hour to return to PLOF of hiking. Progression Towards Functional goals:  [] Patient is progressing as expected towards functional goals listed. [] Progression is slowed due to complexities listed. [] Progression has been slowed due to co-morbidities. [x] Plan just implemented, too soon to assess goals progression  [] Other:     ASSESSMENT:  Gait improving. Added exercises to be completed at gym as patient regularly goes to local gym for workout.     Treatment/Activity Tolerance:  [] Patient tolerated treatment well [] Patient limited by fatique  [] Patient limited by pain  [] Patient limited by other medical complications  [] Other:

## 2019-09-19 ENCOUNTER — HOSPITAL ENCOUNTER (OUTPATIENT)
Dept: PHYSICAL THERAPY | Age: 64
Setting detail: THERAPIES SERIES
Discharge: HOME | End: 2019-09-19
Payer: MEDICARE

## 2019-09-19 PROCEDURE — 97112 NEUROMUSCULAR REEDUCATION: CPT | Performed by: PHYSICAL THERAPIST

## 2019-09-19 PROCEDURE — 97110 THERAPEUTIC EXERCISES: CPT | Performed by: PHYSICAL THERAPIST

## 2019-09-19 PROCEDURE — 97016 VASOPNEUMATIC DEVICE THERAPY: CPT | Performed by: PHYSICAL THERAPIST

## 2019-09-19 NOTE — FLOWSHEET NOTE
for proper ROM for normal function with self care, mobility, lifting and ambulation. Modalities: Vasopneumatic Game[employer] treatment for effusion of L knee and cryotherapy for 10 minutes at medium pressure. Charges:  Timed Code Treatment Minutes: 40   Total Treatment Minutes: 50     [] EVAL LOW 83911  [x] OJ(15187) x  2   [] IONTO  [x] NMR (52891) x      [x] VASO  [] Manual (82282) x       [] Other:  [] TA x       [] Mech Traction (71524)  [] ES(attended) (88518)      [] ES (un) (06509):     GOALS:   Patient stated goal: Hike without pain    Therapist goals for Patient:   Short Term Goals: To be achieved in: 2 weeks  1. Independent in HEP and progression per patient tolerance, in order to prevent re-injury. 2. Patient will have a decrease in pain to facilitate improvement in movement, function, and ADLs as indicated by Functional Deficits. Long Term Goals: To be achieved in: 6 weeks  1. Disability index score of 20% or less for the LEFS to assist with reaching prior level of function. 3. Patient will demonstrate an increase in Strength to good proximal hip strength and control, within 5lb HHD in LE to allow for proper functional mobility as indicated by patients Functional Deficits. 4. Patient will return to full functional activities without increased symptoms or restriction including ability to ascend/ descend stairs with reciprocal gait. 5. Patient will be able to walk on even and uneven ground for > 1 hour to return to PLOF of hiking. Progression Towards Functional goals:  [] Patient is progressing as expected towards functional goals listed. [] Progression is slowed due to complexities listed. [] Progression has been slowed due to co-morbidities. [x] Plan just implemented, too soon to assess goals progression  [] Other:     ASSESSMENT:  Gait improving. Discussed gradual weaning increase to normal activity to limit increase levels of soreness.     Treatment/Activity Tolerance:  [] Patient tolerated treatment well [] Patient limited by fatique  [] Patient limited by pain  [] Patient limited by other medical complications  [] Other:     Prognosis: [] Good [] Fair  [] Poor    Patient Requires Follow-up: [x] Yes  [] No    PLAN: See eval  [x] Continue per plan of care [] Alter current plan (see comments)  [] Plan of care initiated [] Hold pending MD visit [] Discharge    Electronically signed by: Bhumi Hogan PT

## 2019-09-26 ENCOUNTER — HOSPITAL ENCOUNTER (OUTPATIENT)
Dept: PHYSICAL THERAPY | Age: 64
Setting detail: THERAPIES SERIES
Discharge: HOME | End: 2019-09-26
Payer: MEDICARE

## 2019-09-26 PROCEDURE — 97110 THERAPEUTIC EXERCISES: CPT | Performed by: PHYSICAL THERAPIST

## 2019-09-26 PROCEDURE — 97016 VASOPNEUMATIC DEVICE THERAPY: CPT | Performed by: PHYSICAL THERAPIST

## 2019-09-26 PROCEDURE — 97112 NEUROMUSCULAR REEDUCATION: CPT | Performed by: PHYSICAL THERAPIST

## 2019-09-30 DIAGNOSIS — I82.492 ACUTE DEEP VEIN THROMBOSIS (DVT) OF OTHER SPECIFIED VEIN OF LEFT LOWER EXTREMITY (HCC): ICD-10-CM

## 2019-10-10 ENCOUNTER — HOSPITAL ENCOUNTER (OUTPATIENT)
Dept: PHYSICAL THERAPY | Age: 64
Setting detail: THERAPIES SERIES
Discharge: HOME | End: 2019-10-10
Payer: MEDICARE

## 2019-10-10 PROCEDURE — 97016 VASOPNEUMATIC DEVICE THERAPY: CPT | Performed by: PHYSICAL THERAPIST

## 2019-10-10 PROCEDURE — 97112 NEUROMUSCULAR REEDUCATION: CPT | Performed by: PHYSICAL THERAPIST

## 2019-10-10 PROCEDURE — 97110 THERAPEUTIC EXERCISES: CPT | Performed by: PHYSICAL THERAPIST

## 2019-10-16 ASSESSMENT — ENCOUNTER SYMPTOMS
NAUSEA: 0
CONSTIPATION: 0
COUGH: 0
DIARRHEA: 0
SHORTNESS OF BREATH: 0
ABDOMINAL PAIN: 0
CHEST TIGHTNESS: 0
VOMITING: 0
ALLERGIC/IMMUNOLOGIC NEGATIVE: 1

## 2019-11-05 ENCOUNTER — TELEPHONE (OUTPATIENT)
Dept: PHARMACY | Facility: CLINIC | Age: 64
End: 2019-11-05

## 2019-12-31 DIAGNOSIS — Z11.59 SPECIAL SCREENING EXAMINATION FOR VIRAL DISEASE: Primary | ICD-10-CM

## 2020-01-06 ENCOUNTER — TELEPHONE (OUTPATIENT)
Dept: INTERNAL MEDICINE CLINIC | Age: 65
End: 2020-01-06

## 2020-01-06 ENCOUNTER — OFFICE VISIT (OUTPATIENT)
Dept: INTERNAL MEDICINE CLINIC | Age: 65
End: 2020-01-06
Payer: MEDICARE

## 2020-01-06 VITALS
SYSTOLIC BLOOD PRESSURE: 126 MMHG | WEIGHT: 230 LBS | BODY MASS INDEX: 30.34 KG/M2 | OXYGEN SATURATION: 97 % | HEART RATE: 63 BPM | DIASTOLIC BLOOD PRESSURE: 80 MMHG

## 2020-01-06 LAB
CREATININE URINE POCT: NORMAL
MICROALBUMIN/CREAT 24H UR: 50 MG/G{CREAT}
MICROALBUMIN/CREAT UR-RTO: NORMAL

## 2020-01-06 PROCEDURE — 99214 OFFICE O/P EST MOD 30 MIN: CPT | Performed by: INTERNAL MEDICINE

## 2020-01-06 PROCEDURE — 82044 UR ALBUMIN SEMIQUANTITATIVE: CPT | Performed by: INTERNAL MEDICINE

## 2020-01-06 RX ORDER — THIAMINE HCL 100 MG
2500 TABLET ORAL ONCE
COMMUNITY

## 2020-01-06 ASSESSMENT — PATIENT HEALTH QUESTIONNAIRE - PHQ9
2. FEELING DOWN, DEPRESSED OR HOPELESS: 0
SUM OF ALL RESPONSES TO PHQ9 QUESTIONS 1 & 2: 0
SUM OF ALL RESPONSES TO PHQ QUESTIONS 1-9: 0
SUM OF ALL RESPONSES TO PHQ QUESTIONS 1-9: 0
1. LITTLE INTEREST OR PLEASURE IN DOING THINGS: 0

## 2020-01-06 NOTE — PROGRESS NOTES
2020     Yesi Pino (:  1955) is a 71 y.o. male, here for evaluation of the following medical concerns:  CC: DVT  HPI  : Patient with obesity, DM 2, hyperlipidemia, DAQUAN, DVT  Upon review weight down 13 pounds since last office visit. Patient states he is eating on a plant-based diet. Concerning diabetes glucose fingerstick in the morning fasting before breakfast range:   Reviewed laboratory data [facility] 2020 CBC: Hemoglobin 12.7. Hematocrit 36.2. . Chemistry panel: Glucose: 102 otherwise unremarkable. Lipid panel: Total cholesterol: 176. LDL cholesterol: 103. Hemoglobin A1c  5.3. PSA:  0.5. Reviewed last colonoscopy 2013 colon polyps x2. Redo 2 years. Reviewed office visit 2019: Gisele Gomez: APRN: Patient complained of left-sided knee pain. Of note was a long 8-hour flight from [place] also with a history of DVT left lower extremity. Lower extremity DVT study was ordered. Reviewed: 2019: Lower extremity DVT study left leg: Evidence of acute DVT thrombus involving the soleal vein left lower extremity. Evidence of chronic deep venous thrombosis involving the distal femoral and popliteal veins left lower extremity. Patient was placed on Eliquis now  taking 5 mg twice daily. Reviewed office visit 2019: Gisele Gomez: APRN. Patient stated his knee pain appeared to be unrelated to his DVT. Referred to orthopedics. Patient relates first DVT about 4 years ago same leg. Family history: Negative for DVT. Review of Systems  Review of Systems   Constitutional: negative   HENT: negative   EYES: negative   Respiratory: negative   Gastrointestinal: negative   Endocrine: Weight loss on diet. Much improvement in DM2 readings. Musculoskeletal: DVT left lower extremity. Long flight from [place]. Previous DVT about 4 years ago.   Skin: negative   Allergic/Immunological: negative   Hematological: negative   Psychiatric/Behavorial: negative   CV: negative   CNS: negative :Negative   S/E:Negative  Renal: Negative      Prior to Visit Medications    Medication Sig Taking? Authorizing Provider   Cyanocobalamin (VITAMIN B-12) 2500 MCG SUBL Place 2,500 mcg under the tongue once Takes once a week. Yes Historical Provider, MD   apixaban (ELIQUIS) 2.5 MG TABS tablet TAKE 1 TABLET BY MOUTH TWICE DAILY FOR BLOOD CLOT Yes Cici Eng MD   metFORMIN (GLUCOPHAGE) 500 MG tablet TAKE 1 TABLET BY MOUTH AT BREAKFAST. Diabetic medicine. Yes SANTIAGO Reyes - CNP   vitamin D (CHOLECALCIFEROL) 1000 UNIT TABS tablet Take 1,000 Units by mouth daily Yes Historical Provider, MD   magnesium (MAGNESIUM-OXIDE) 250 MG TABS tablet Take 250 mg by mouth daily Yes Historical Provider, MD   Multiple Vitamins-Minerals (THERAPEUTIC MULTIVITAMIN-MINERALS) tablet Take 1 tablet by mouth daily. Yes Historical Provider, MD   fluticasone (FLONASE) 50 MCG/ACT nasal spray 1 spray by Nasal route as needed  Yes Historical Provider, MD   Loratadine-Pseudoephedrine (CLARITIN-D 12 HOUR PO) Take by mouth as needed  Yes Historical Provider, MD        Social History     Tobacco Use    Smoking status: Never Smoker    Smokeless tobacco: Never Used   Substance Use Topics    Alcohol use: Yes     Comment: 4 / month        Vitals:    01/06/20 1553   BP: 126/80   Site: Right Upper Arm   Position: Sitting   Cuff Size: Large Adult   Pulse: 63   SpO2: 97%   Weight: 230 lb (104.3 kg)     Estimated body mass index is 30.34 kg/m² as calculated from the following:    Height as of 9/11/19: 6' 1\" (1.854 m). Weight as of this encounter: 230 lb (104.3 kg). Physical Exam  Head/neck: Ears: Normal TM. No obstruction. Throat: No exudates, no erythema, no tonsillar enlargement. Neck: No lymphadenopathy. Thyroid is nonpalpable. Eyes: EOMI, PERRLA with no nystagmus  Lungs: Clear to auscultation. CV: S1-S2 normal.   ARSALAN murmur. Carotid: No bruit. Abdominal Examination: Bowel sounds present. Soft nontender.   No mass no guarding or

## 2020-05-01 ENCOUNTER — TELEPHONE (OUTPATIENT)
Dept: INTERNAL MEDICINE CLINIC | Age: 65
End: 2020-05-01

## 2020-09-29 ENCOUNTER — OFFICE VISIT (OUTPATIENT)
Dept: INTERNAL MEDICINE CLINIC | Age: 65
End: 2020-09-29
Payer: MEDICARE

## 2020-09-29 VITALS
HEART RATE: 65 BPM | OXYGEN SATURATION: 98 % | TEMPERATURE: 98.4 F | SYSTOLIC BLOOD PRESSURE: 110 MMHG | BODY MASS INDEX: 31.81 KG/M2 | HEIGHT: 73 IN | WEIGHT: 240 LBS | DIASTOLIC BLOOD PRESSURE: 70 MMHG

## 2020-09-29 LAB — HBA1C MFR BLD: 5.4 %

## 2020-09-29 PROCEDURE — G0438 PPPS, INITIAL VISIT: HCPCS | Performed by: NURSE PRACTITIONER

## 2020-09-29 PROCEDURE — 90694 VACC AIIV4 NO PRSRV 0.5ML IM: CPT | Performed by: NURSE PRACTITIONER

## 2020-09-29 PROCEDURE — G0008 ADMIN INFLUENZA VIRUS VAC: HCPCS | Performed by: NURSE PRACTITIONER

## 2020-09-29 PROCEDURE — 90732 PPSV23 VACC 2 YRS+ SUBQ/IM: CPT | Performed by: NURSE PRACTITIONER

## 2020-09-29 PROCEDURE — 83036 HEMOGLOBIN GLYCOSYLATED A1C: CPT | Performed by: NURSE PRACTITIONER

## 2020-09-29 PROCEDURE — G0009 ADMIN PNEUMOCOCCAL VACCINE: HCPCS | Performed by: NURSE PRACTITIONER

## 2020-09-29 ASSESSMENT — LIFESTYLE VARIABLES
AUDIT TOTAL SCORE: 2
HOW OFTEN DURING THE LAST YEAR HAVE YOU FAILED TO DO WHAT WAS NORMALLY EXPECTED FROM YOU BECAUSE OF DRINKING: 0
HOW OFTEN DURING THE LAST YEAR HAVE YOU NEEDED AN ALCOHOLIC DRINK FIRST THING IN THE MORNING TO GET YOURSELF GOING AFTER A NIGHT OF HEAVY DRINKING: 0
HOW OFTEN DO YOU HAVE A DRINK CONTAINING ALCOHOL: 2
HOW MANY STANDARD DRINKS CONTAINING ALCOHOL DO YOU HAVE ON A TYPICAL DAY: 0
HOW OFTEN DURING THE LAST YEAR HAVE YOU BEEN UNABLE TO REMEMBER WHAT HAPPENED THE NIGHT BEFORE BECAUSE YOU HAD BEEN DRINKING: 0
HAVE YOU OR SOMEONE ELSE BEEN INJURED AS A RESULT OF YOUR DRINKING: 0
HOW OFTEN DURING THE LAST YEAR HAVE YOU HAD A FEELING OF GUILT OR REMORSE AFTER DRINKING: 0
HAS A RELATIVE, FRIEND, DOCTOR, OR ANOTHER HEALTH PROFESSIONAL EXPRESSED CONCERN ABOUT YOUR DRINKING OR SUGGESTED YOU CUT DOWN: 0
HOW OFTEN DURING THE LAST YEAR HAVE YOU FOUND THAT YOU WERE NOT ABLE TO STOP DRINKING ONCE YOU HAD STARTED: 0
HOW OFTEN DO YOU HAVE SIX OR MORE DRINKS ON ONE OCCASION: 0
AUDIT-C TOTAL SCORE: 2

## 2020-09-29 ASSESSMENT — PATIENT HEALTH QUESTIONNAIRE - PHQ9
SUM OF ALL RESPONSES TO PHQ QUESTIONS 1-9: 0
2. FEELING DOWN, DEPRESSED OR HOPELESS: 0
SUM OF ALL RESPONSES TO PHQ9 QUESTIONS 1 & 2: 0
SUM OF ALL RESPONSES TO PHQ QUESTIONS 1-9: 0
1. LITTLE INTEREST OR PLEASURE IN DOING THINGS: 0

## 2020-09-29 NOTE — PROGRESS NOTES
Vaccine Information Sheet, \"Influenza - Inactivated\"  given to Ledy Koehler, or parent/legal guardian of  Ledy Koehler and verbalized understanding. Patient responses:    Have you ever had a reaction to a flu vaccine? No  Do you have any current illness? No  Have you ever had Guillian Home Syndrome? No  Do you have a serious allergy to any of the follow: Neomycin, Polymyxin, Thimerosal, eggs or egg products? No    Flu vaccine given per order. Please see immunization tab. Risks and benefits explained. Current VIS given.       Immunizations Administered     Name Date Dose Route    Influenza, Quadv, adjuvanted, 65 yrs +, IM, PF (Fluad) 9/29/2020 0.5 mL Intramuscular    Site: Deltoid- Right    Lot: [id number]    NDC: 74820-058-97    Pneumococcal Polysaccharide ([id number]) 9/29/2020 0.5 mL Intramuscular    Site: Deltoid- Left    Lot: [id number]    ND: 8387-7908-69

## 2020-09-29 NOTE — PROGRESS NOTES
Medicare Annual Wellness Visit  Name: Kaitlyn Childress Date: 2020   MRN: <[id number]> Sex: Male   Age: 76 y.o. Ethnicity: Non-/Non    : 1955 Race: Jose Holt is here for Medicare AWV    Screenings for behavioral, psychosocial and functional/safety risks, and cognitive dysfunction are all negative except as indicated below. These results, as well as other patient data from the [address] form, are documented in Flowsheets linked to this Encounter. Allergies   Allergen Reactions    Cipro Xr Rash         Prior to Visit Medications    Medication Sig Taking? Authorizing Provider   Cyanocobalamin (VITAMIN B-12) 2500 MCG SUBL Place 2,500 mcg under the tongue once Takes once a week. Yes Historical Provider, MD   vitamin D (CHOLECALCIFEROL) 1000 UNIT TABS tablet Take 1,000 Units by mouth daily Yes Historical Provider, MD   magnesium (MAGNESIUM-OXIDE) 250 MG TABS tablet Take 250 mg by mouth daily Yes Historical Provider, MD   Multiple Vitamins-Minerals (THERAPEUTIC MULTIVITAMIN-MINERALS) tablet Take 1 tablet by mouth daily.  Yes Historical Provider, MD   fluticasone (FLONASE) 50 MCG/ACT nasal spray 1 spray by Nasal route as needed  Yes Historical Provider, MD   Loratadine-Pseudoephedrine (CLARITIN-D 12 HOUR PO) Take by mouth as needed  Yes Historical Provider, MD   apixaban (ELIQUIS) 2.5 MG TABS tablet TAKE 1 TABLET BY MOUTH TWICE DAILY FOR BLOOD CLOT  Patient not taking: Reported on 2020  Norma Eng MD         Past Medical History:   Diagnosis Date    Allergic rhinitis     BPH associated with nocturia     Cardiac septal defect     NO Surgery    Diverticulosis     [facility] thrombosis[facility])     History of colonic polyps     Hyperlipemia     Insomnia     Obesity     DAQUAN on CPAP     Plantar fasciitis     Prostatitis     Status post dilation of urethral narrowing     Type 2 diabetes mellitus without complication [facility])        Past Surgical History:   Procedure Laterality Date    COLONOSCOPY  11/05    tubular adenoma / divertics REDO 3yrs    COLONOSCOPY  11/20/08    Hyperplastic polyp redo 5yrs    COLONOSCOPY  11/20/13    Polyps(2). Divertics Redo 3 yrs    CYST REMOVAL      URETHRAL STRICTURE DILATATION           Family History   Problem Relation Age of Onset    Emphysema Father    Cecy Castro Arthritis Mother     Diabetes Mother     Macular Degen Mother     Heart Surgery Brother         Septal defect repair       CareTeam (Including outside providers/suppliers regularly involved in providing care):   Patient Care Team:  Marina Koo MD as PCP - General (Internal Medicine)  Marina Koo MD as PCP - [facility] Provider    Wt Readings from Last 3 Encounters:   09/29/20 240 lb (108.9 kg)   01/06/20 230 lb (104.3 kg)   09/11/19 243 lb (110.2 kg)     Vitals:    09/29/20 1422   BP: 110/70   Site: Left Upper Arm   Position: Sitting   Cuff Size: Medium Adult   Pulse: 65   Temp: 98.4 °F (36.9 °C)   TempSrc: Infrared   SpO2: 98%   Weight: 240 lb (108.9 kg)   Height: 6' 1\" (1.854 m)     Body mass index is 31.66 kg/m². Based upon direct observation of the patient, evaluation of cognition reveals recent and remote memory intact.     General Appearance: alert and oriented to person, place and time, well developed and well- nourished, in no acute distress  Skin: warm and dry, no rash or erythema  Head: normocephalic and atraumatic  Eyes: pupils equal, round, and reactive to light, extraocular eye movements intact, conjunctivae normal  ENT: tympanic membrane, external ear and ear canal normal bilaterally, nose without deformity, nasal mucosa and turbinates normal without polyps  Neck: supple and non-tender without mass, no thyromegaly or thyroid nodules, no cervical lymphadenopathy  Pulmonary/Chest: clear to auscultation bilaterally- no wheezes, rales or rhonchi, normal air movement, no respiratory distress  Cardiovascular: normal rate, regular rhythm, normal S1 and S2, no murmurs, rubs, clicks, or gallops, distal pulses intact, no carotid bruits  Abdomen: soft, non-tender, non-distended, normal bowel sounds, no masses or organomegaly  Extremities: no cyanosis, clubbing or edema  Musculoskeletal: normal range of motion, no joint swelling, deformity or tenderness  Neurologic: reflexes normal and symmetric, no cranial nerve deficit, gait, coordination and speech normal    Patient's complete Health Risk Assessment and screening values have been reviewed and are found in Flowsheets. The following problems were reviewed today and where indicated follow up appointments were made and/or referrals ordered. Positive Risk Factor Screenings with Interventions:     Health Habits/Nutrition:  Health Habits/Nutrition  Do you exercise for at least 20 minutes 2-3 times per week?: Yes  Have you lost any weight without trying in the past 3 months?: No  Do you eat fewer than 2 meals per day?: No  Have you seen a dentist within the past year?: Yes  Body mass index is 31.66 kg/m².   Health Habits/Nutrition Interventions:  · Nutritional issues:  educational materials for healthy, well-balanced diet provided, patient agrees to work toward a weight loss goal of 10 pounds over the next 2 month(s) using the following plan: mediterranean diet    Personalized Preventive Plan   Current Health Maintenance Status  Immunization History   Administered Date(s) Administered    Influenza 12/03/2012    Influenza Virus Vaccine 10/04/2011, 10/13/2014, 11/02/2015    Influenza, Quadv, IM, (6 mo and older Fluzone, Flulaval, Fluarix and 3 yrs and older Afluria) 10/21/2016, 10/24/2017    Influenza, Quadv, IM, PF (6 mo and older Fluzone, Flulaval, Fluarix, and 3 yrs and older Afluria) 01/07/2019    Influenza, Triv, inactivated, subunit, adjuvanted, IM (Fluad 65 yrs and older) 09/11/2019    Pneumococcal Conjugate 13-valent ([place] [employer]) 09/11/2019    Tdap (Boostrix, Adacel) 04/01/2009, 09/11/2019        Health Maintenance   Topic Date Due    Statin Therapy  1955    Hepatitis C screen  1955    Shingles Vaccine (1 of 2) 02/23/2004    Annual Wellness Visit (AWV)  10/11/2019    Diabetic foot exam  01/07/2020    Diabetic retinal exam  04/20/2020    A1C test (Diabetic or Prediabetic)  07/06/2020    Flu vaccine (1) 09/01/2020    Pneumococcal 65+ years Vaccine (2 of 2 - PPSV23) 09/11/2020    Diabetic microalbuminuria test  01/06/2021    Lipid screen  09/21/2021    Colon cancer screen colonoscopy  11/20/2023    DTaP/Tdap/Td vaccine (3 - Td) 09/11/2029    Hepatitis A vaccine  Aged Out    Hib vaccine  Aged Out    Meningococcal (ACWY) vaccine  Aged Out     Recommendations for [employer] Due: see orders and patient instructions/AVS.  . Recommended screening schedule for the next 5-10 years is provided to the patient in written form: see Patient Instructions/AVS.    Guillermina Blanca was seen today for medicare awv. Diagnoses and all orders for this visit:    Need for influenza vaccination  -     INFLUENZA, QUADV, ADJUVANTED, 72 YRS =, IM, PF, PREFILL SYR, 0.5ML (FLUAD)    Routine general medical examination at a health care facility  -     POCT glycosylated hemoglobin (Hb A1C)    Need for pneumococcal vaccination  -     PNEUMOVAX 23 subcutaneous/IM (Pneumococcal polysaccharide vaccine 23-valent >= 3yo)    Advance Care Planning   Advanced Care Planning: Discussed the patients choices for care and treatment in case of a health event that adversely affects decision-making abilities. Also discussed the patients long-term treatment options. Reviewed with the patient the [address] [place] [address] & [facility] Declaration forms  Reviewed the process of designating a competent adult as an Agent (or -in-fact) that could take make health care decisions for the patient if incompetent.  Patient was asked to complete the declaration forms, either acknowledge the forms by a public notary or an eligible witness and provide a signed copy to the practice office. Time spent (minutes): 2     Obesity Counseling: Assessed behavioral health risks and factors affecting choice of behavior. Suggested weight control approaches, including dietary changes behavioral modification and follow up plan. Provided educational and support documentation. Time spent (minutes): 5    Cardiovascular Disease Risk Counseling: Assessed the patient's risk to develop cardiovascular disease and reviewed main risk factors. Reviewed steps to reduce disease risk including:   · Quitting tobacco use, reducing amount smoked, or not starting the habit  · Making healthy food choices  · Being physically active and gradualy increasing activity levels   · Reduce weight and determine a healthy BMI goal  · Monitor blood pressure and treat if higher than 140/90 mmHg  · Maintain blood total cholesterol levels under 5 mmol/l or 190 mg/dl  · Maintain LDL cholesterol levels under 3.0 mmol/l or 115 mg/dl   · Control blood glucose levels  · Consider taking aspirin (75 mg daily), once blood pressure is controlled   Provided a follow up plan.   Time spent (minutes): 5

## 2020-09-29 NOTE — PATIENT INSTRUCTIONS
Learning About Low-Carbohydrate Diets  What is a low-carbohydrate diet? A low-carbohydrate (or \"low-carb\") diet limits foods and drinks that have carbohydrates. This includes grains, fruits, milk and yogurt, and starchy vegetables like potatoes, beans, and corn. It also avoids foods and drinks that have added sugar. Instead, low-carb diets include foods that are high in protein and fat. Why might you follow a low-carb diet? Low-carb diets may be used for a variety of reasons, such as for weight loss. People who have diabetes may use a low-carb diet to help manage their blood sugar levels. What should you do before you start the diet? Talk to your doctor before you try any diet. This is even more important if you have health problems like kidney disease, heart disease, or diabetes. Your doctor may suggest that you meet with a registered dietitian. A dietitian can help you make an eating plan that works for you. What foods do you eat on a low-carb diet? On a low-carb diet, you choose foods that are high in protein and fat. Examples of these are:  · Meat, poultry, and fish. · Eggs. · Nuts, such as walnuts, pecans, almonds, and peanuts. · Peanut butter and other nut butters. · Tofu. · Avocado. · Carnella Putt. · Non-starchy vegetables like broccoli, cauliflower, green beans, mushrooms, peppers, lettuce, and spinach. · Unsweetened non-dairy milks like almond milk and coconut milk. · Cheese, cottage cheese, and cream cheese. Current as of: August 22, 2019               Content Version: 12.5  © 8927-1693 [employer], [employer]. Care instructions adapted under license by [facility] ([facility]). If you have questions about a medical condition or this instruction, always ask your healthcare professional. Petra Landaverde any warranty or liability for your use of this information.       Personalized Preventive Plan for Ledy Primer - 9/29/2020  Medicare offers a range of preventive health

## 2020-11-03 PROBLEM — E66.9 OBESITY: Status: RESOLVED | Noted: 2020-11-03 | Resolved: 2020-11-03

## 2020-11-06 ENCOUNTER — NURSE ONLY (OUTPATIENT)
Dept: INTERNAL MEDICINE CLINIC | Age: 65
End: 2020-11-06
Payer: MEDICARE

## 2020-11-06 VITALS — TEMPERATURE: 98.1 F

## 2020-11-06 PROCEDURE — 90471 IMMUNIZATION ADMIN: CPT | Performed by: NURSE PRACTITIONER

## 2020-11-06 PROCEDURE — 90750 HZV VACC RECOMBINANT IM: CPT | Performed by: NURSE PRACTITIONER

## 2021-01-08 ENCOUNTER — TELEPHONE (OUTPATIENT)
Dept: INTERNAL MEDICINE CLINIC | Age: 66
End: 2021-01-08

## 2021-01-08 ENCOUNTER — NURSE ONLY (OUTPATIENT)
Dept: INTERNAL MEDICINE CLINIC | Age: 66
End: 2021-01-08
Payer: MEDICARE

## 2021-01-08 VITALS — TEMPERATURE: 97.1 F

## 2021-01-08 DIAGNOSIS — Z12.5 SPECIAL SCREENING FOR MALIGNANT NEOPLASM OF PROSTATE: ICD-10-CM

## 2021-01-08 DIAGNOSIS — E11.9 TYPE 2 DIABETES MELLITUS WITHOUT COMPLICATION, WITHOUT LONG-TERM CURRENT USE OF INSULIN (HCC): Primary | ICD-10-CM

## 2021-01-08 DIAGNOSIS — Z23 NEED FOR SHINGLES VACCINE: Primary | ICD-10-CM

## 2021-01-08 PROCEDURE — 90750 HZV VACC RECOMBINANT IM: CPT | Performed by: INTERNAL MEDICINE

## 2021-01-08 PROCEDURE — 90471 IMMUNIZATION ADMIN: CPT | Performed by: INTERNAL MEDICINE

## 2021-01-08 NOTE — TELEPHONE ENCOUNTER
Please call patient. Chart reviewed. Needs: Checkup on sugar/diabetes  1. Fasting laboratory studies, ordered in epic, before  2.   Office visit with me March/2021  Dr. James Crawford

## 2021-03-26 ENCOUNTER — HOSPITAL ENCOUNTER (OUTPATIENT)
Age: 66
Discharge: HOME | End: 2021-03-26
Payer: MEDICARE

## 2021-03-26 DIAGNOSIS — Z12.5 SPECIAL SCREENING FOR MALIGNANT NEOPLASM OF PROSTATE: ICD-10-CM

## 2021-03-26 DIAGNOSIS — E11.9 TYPE 2 DIABETES MELLITUS WITHOUT COMPLICATION, WITHOUT LONG-TERM CURRENT USE OF INSULIN (HCC): ICD-10-CM

## 2021-03-26 LAB
A/G RATIO: 1.3 (ref 1.1–2.2)
ALBUMIN SERPL-MCNC: 4 G/DL (ref 3.4–5)
ALP BLD-CCNC: 82 U/L (ref 40–129)
ALT SERPL-CCNC: 14 U/L (ref 10–40)
ANION GAP SERPL CALCULATED.3IONS-SCNC: 7 MMOL/L (ref 3–16)
AST SERPL-CCNC: 22 U/L (ref 15–37)
BILIRUB SERPL-MCNC: 0.5 MG/DL (ref 0–1)
BUN BLDV-MCNC: 11 MG/DL (ref 7–20)
CALCIUM SERPL-MCNC: 8.8 MG/DL (ref 8.3–10.6)
CHLORIDE BLD-SCNC: 104 MMOL/L (ref 99–110)
CHOLESTEROL, TOTAL: 166 MG/DL (ref 0–199)
CO2: 26 MMOL/L (ref 21–32)
CREAT SERPL-MCNC: 0.8 MG/DL (ref 0.8–1.3)
ESTIMATED AVERAGE GLUCOSE: 114 MG/DL
GFR AFRICAN AMERICAN: >60
GFR NON-AFRICAN AMERICAN: >60
GLOBULIN: 3 G/DL
GLUCOSE BLD-MCNC: 115 MG/DL (ref 70–99)
HBA1C MFR BLD: 5.6 %
HDLC SERPL-MCNC: 48 MG/DL (ref 40–60)
LDL CHOLESTEROL CALCULATED: 107 MG/DL
POTASSIUM SERPL-SCNC: 4.4 MMOL/L (ref 3.5–5.1)
PROSTATE SPECIFIC ANTIGEN: 0.69 NG/ML (ref 0–4)
SODIUM BLD-SCNC: 137 MMOL/L (ref 136–145)
TOTAL PROTEIN: 7 G/DL (ref 6.4–8.2)
TRIGL SERPL-MCNC: 54 MG/DL (ref 0–150)
VLDLC SERPL CALC-MCNC: 11 MG/DL

## 2021-03-26 PROCEDURE — 36415 COLL VENOUS BLD VENIPUNCTURE: CPT

## 2021-03-26 PROCEDURE — 80053 COMPREHEN METABOLIC PANEL: CPT

## 2021-03-26 PROCEDURE — 80061 LIPID PANEL: CPT

## 2021-03-26 PROCEDURE — 83036 HEMOGLOBIN GLYCOSYLATED A1C: CPT

## 2021-03-26 PROCEDURE — G0103 PSA SCREENING: HCPCS

## 2021-03-26 PROCEDURE — 84153 ASSAY OF PSA TOTAL: CPT

## 2021-04-01 ENCOUNTER — VIRTUAL VISIT (OUTPATIENT)
Dept: INTERNAL MEDICINE CLINIC | Age: 66
End: 2021-04-01
Payer: MEDICARE

## 2021-04-01 DIAGNOSIS — E11.9 TYPE 2 DIABETES MELLITUS WITHOUT COMPLICATION, WITHOUT LONG-TERM CURRENT USE OF INSULIN (HCC): Primary | ICD-10-CM

## 2021-04-01 DIAGNOSIS — E78.5 HYPERLIPIDEMIA, UNSPECIFIED HYPERLIPIDEMIA TYPE: ICD-10-CM

## 2021-04-01 PROCEDURE — 99214 OFFICE O/P EST MOD 30 MIN: CPT | Performed by: NURSE PRACTITIONER

## 2021-04-01 RX ORDER — ASPIRIN 81 MG/1
81 TABLET ORAL DAILY
COMMUNITY

## 2021-04-01 SDOH — ECONOMIC STABILITY: TRANSPORTATION INSECURITY
IN THE PAST 12 MONTHS, HAS THE LACK OF TRANSPORTATION KEPT YOU FROM MEDICAL APPOINTMENTS OR FROM GETTING MEDICATIONS?: NO

## 2021-04-01 SDOH — ECONOMIC STABILITY: INCOME INSECURITY: HOW HARD IS IT FOR YOU TO PAY FOR THE VERY BASICS LIKE FOOD, HOUSING, MEDICAL CARE, AND HEATING?: NOT HARD AT ALL

## 2021-04-01 ASSESSMENT — ENCOUNTER SYMPTOMS
COUGH: 0
CONSTIPATION: 0
ABDOMINAL PAIN: 0
WHEEZING: 0
BLOOD IN STOOL: 0
SHORTNESS OF BREATH: 0
DIARRHEA: 0
NAUSEA: 0
EYE DISCHARGE: 0
VOMITING: 0

## 2021-04-01 ASSESSMENT — PATIENT HEALTH QUESTIONNAIRE - PHQ9
2. FEELING DOWN, DEPRESSED OR HOPELESS: 0
SUM OF ALL RESPONSES TO PHQ9 QUESTIONS 1 & 2: 0
SUM OF ALL RESPONSES TO PHQ QUESTIONS 1-9: 0
1. LITTLE INTEREST OR PLEASURE IN DOING THINGS: 0

## 2021-04-01 NOTE — PROGRESS NOTES
Betzy Khan (:  1955) is a 78 y.o. male,Established patient, here for evaluation of the following chief complaint(s): Diabetes      ASSESSMENT/PLAN:  1. Type 2 diabetes mellitus without complication, without long-term current use of insulin ([facility] [address]  Reviewed lab results with patient   Discussed that numbers represent an IFG state or DM controlled. REivewed that DM is progressive and our goal with careful surveillance is so we can intervene quickly when and if needed. Discussed the different schools of thought with metformin; values are such now that he doesn't have to have metformin. Some researchers believe there is benefit in slowing down the progression of DM. He will consider our discussion and decide on course of action  Update labs one week next OV with Dr. Savanna Spear in 6 months  -     Hemoglobin A1C; Future  -     Comprehensive Metabolic Panel; Future  2. Hyperlipidemia, unspecified hyperlipidemia type  Reviewed change in food choices that are reflected in improved cholesterol numbers  Continue to follow healthy food plan  COntinue to exercise (walks 1hr/day)  Update labs one week before next OV with DR. Eng in 6 months. -     Lipid Panel; Future    COlonsocopy is due. He will plan to schedule it now that he has his covid vaccines. FU in 6 months    SUBJECTIVE/OBJECTIVE:  HPI  Pt presents for review of lab results and evaluation of HDL and DM    HLD:  He has changed food choices eating more plant based foods now  Total C is 166, HDL 48, , Triglycerides 54  HE is not on any cholesterol meds    DM: past dx of DM. However, labs currently with glucose of 115, A1C of 5.6  He previously took metformin 500 mg bid but is not taking any at this time. Review of Systems   Constitutional: Negative for fever. HENT: Negative for congestion. Eyes: Negative for discharge. Respiratory: Negative for cough, shortness of breath and wheezing.     Cardiovascular: Negative for chest pain, palpitations and leg swelling. Gastrointestinal: Negative for abdominal pain, blood in stool, constipation, diarrhea, nausea and vomiting. Genitourinary: Negative for dysuria and hematuria. Musculoskeletal: Negative for myalgias. Skin: Negative for rash. Neurological: Negative for dizziness. Psychiatric/Behavioral: The patient is not nervous/anxious. Patient-Reported Vitals 4/1/2021   Patient-Reported Weight 238LB        Physical Exam    [INSTRUCTIONS:  \"[x]\" Indicates a positive item  \"[]\" Indicates a negative item  -- DELETE ALL ITEMS NOT EXAMINED]    Constitutional: [x] Appears well-developed and well-nourished [x] No apparent distress      [] Abnormal -     Mental status: [x] Alert and awake  [x] Oriented to person/place/time [x] Able to follow commands    [] Abnormal -     Eyes:   EOM    [x]  Normal    [] Abnormal -   Sclera  [x]  Normal    [] Abnormal -          Discharge [x]  None visible   [] Abnormal -     HENT: [x] Normocephalic, atraumatic  [] Abnormal -   [x] Mouth/Throat: Mucous membranes are moist    External Ears [x] Normal  [] Abnormal -    Neck: [x] No visualized mass [] Abnormal -     Pulmonary/Chest: [x] Respiratory effort normal   [x] No visualized signs of difficulty breathing or respiratory distress        [] Abnormal -      Musculoskeletal:   [x] Normal gait with no signs of ataxia         [x] Normal range of motion of neck        [] Abnormal -     Neurological:        [x] No Facial Asymmetry (Cranial nerve 7 motor function) (limited exam due to video visit)          [x] No gaze palsy        [] Abnormal -          Skin:        [x] No significant exanthematous lesions or discoloration noted on facial skin         [] Abnormal -            Psychiatric:       [x] Normal Affect [] Abnormal -        [x] No Hallucinations    Other pertinent observable physical exam findings:-                Demetra Dance, was evaluated through a synchronous (real-time) audio-video encounter.  The patient (or guardian if applicable) is aware that this is a billable service. Verbal consent to proceed has been obtained within the past 12 months. The visit was conducted pursuant to the emergency declaration under the [address] [place] [address], [address] [place] [address] authority and the [employer] General Act. Patient identification was verified, and a caregiver was present when appropriate. The patient was located in a state where the provider was credentialed to provide care. An electronic signature was used to authenticate this note.     --Adriana May, SANTIAGO - CNP

## 2021-11-12 ENCOUNTER — HOSPITAL ENCOUNTER (OUTPATIENT)
Age: 66
Discharge: HOME | End: 2021-11-12
Payer: MEDICARE

## 2021-11-12 DIAGNOSIS — E11.9 TYPE 2 DIABETES MELLITUS WITHOUT COMPLICATION, WITHOUT LONG-TERM CURRENT USE OF INSULIN (HCC): ICD-10-CM

## 2021-11-12 LAB
A/G RATIO: 1.8 (ref 1.1–2.2)
ALBUMIN SERPL-MCNC: 4.6 G/DL (ref 3.4–5)
ALP BLD-CCNC: 85 U/L (ref 40–129)
ALT SERPL-CCNC: 17 U/L (ref 10–40)
ANION GAP SERPL CALCULATED.3IONS-SCNC: 12 MMOL/L (ref 3–16)
AST SERPL-CCNC: 25 U/L (ref 15–37)
BILIRUB SERPL-MCNC: 0.8 MG/DL (ref 0–1)
BUN BLDV-MCNC: 11 MG/DL (ref 7–20)
CALCIUM SERPL-MCNC: 9.5 MG/DL (ref 8.3–10.6)
CHLORIDE BLD-SCNC: 100 MMOL/L (ref 99–110)
CHOLESTEROL, TOTAL: 163 MG/DL (ref 0–199)
CO2: 25 MMOL/L (ref 21–32)
CREAT SERPL-MCNC: 0.9 MG/DL (ref 0.8–1.3)
GFR AFRICAN AMERICAN: >60
GFR NON-AFRICAN AMERICAN: >60
GLUCOSE BLD-MCNC: 102 MG/DL (ref 70–99)
HDLC SERPL-MCNC: 56 MG/DL (ref 40–60)
LDL CHOLESTEROL CALCULATED: 93 MG/DL
POTASSIUM SERPL-SCNC: 5 MMOL/L (ref 3.5–5.1)
SODIUM BLD-SCNC: 137 MMOL/L (ref 136–145)
TOTAL PROTEIN: 7.2 G/DL (ref 6.4–8.2)
TRIGL SERPL-MCNC: 72 MG/DL (ref 0–150)
VLDLC SERPL CALC-MCNC: 14 MG/DL

## 2021-11-12 PROCEDURE — 83036 HEMOGLOBIN GLYCOSYLATED A1C: CPT

## 2021-11-12 PROCEDURE — 36415 COLL VENOUS BLD VENIPUNCTURE: CPT

## 2021-11-12 PROCEDURE — 80053 COMPREHEN METABOLIC PANEL: CPT

## 2021-11-12 PROCEDURE — 80061 LIPID PANEL: CPT

## 2021-11-13 LAB
ESTIMATED AVERAGE GLUCOSE: 99.7 MG/DL
HBA1C MFR BLD: 5.1 %

## 2021-11-14 NOTE — PROGRESS NOTES
Jessica Ybarra 78 y.o. male presents today for an Established patient for   Chief Complaint   Patient presents with    Diabetes    Flu Vaccine    Colon Cancer Screening     magnus / done 3 months ago    Eye Exam     opticare            ASSESSMENT/PLAN    1. Type 2 diabetes mellitus without complication, without long-term current use of insulin (HCC)          A1c 5.1 improved off Metformin      - POCT microalbumin: Obtained in the office today  - Hemoglobin A1C; Future  - CBC; Future         Diabetic foot exam performed in the office today    2. Hyperlipidemia, unspecified hyperlipidemia type       Stable continue to monitor  - Lipid Panel; Future  - Comprehensive Metabolic Panel; Future    3. Acute deep vein thrombosis (DVT) of other specified vein of left lower extremity (HCC)                  Stable we will continue to monitor    4. Class 1 obesity without serious comorbidity with body mass index (BMI) of 33.0 to 33.9 in adult, unspecified obesity type                Weight loss on diet    5. Flu vaccine need                Health maintenance. 6. Vitamin D deficiency                  Continue to monitor  - Vitamin D 25 Hydroxy; Future    7. Special screening for malignant neoplasm of prostate                       Health maintenance. - PSA screening; Future       Return in about 1 year (around 11/16/2022) for DM   LIPIDS. HPI:     Review last office visit with me: 1/6/2020. History of DVT, DM2 with decrease in weight able to stop Metformin and observe, hyperlipidemia, left knee pain, anemia. Laboratory data:/12/21. Chemistry panel: Glucose 102. Lipid panel: Total cholesterol: 163. LDL cholesterol 93.   A1c: 5.1  Health maintenance: Hepatitis C, colonoscopy, Diabetic Foot IN Microalbumin, influenza vaccine, AWV.  11/20/2013 colonoscopy 2 polyps were found redo 3 years Dr. Cyndie Nur  4/1/2021 virtual visit Kiera Steward nurse practitioner diabetic checkup    Results for orders placed or performed in visit on 11/16/21   POCT microalbumin   Result Value Ref Range    Microalb, Ur 0     Creatinine Ur POCT      Microalbumin Creatinine Ratio                ROS:  Review of Systems   Constitutional: negative   HENT: negative   EYES: negative   Respiratory: negative   Gastrointestinal: negative   Endocrine: Glucose continues to improve  Musculoskeletal: negative   Skin: negative   Allergic/Immunological: negative   Hematological: negative   Psychiatric/Behavorial: negative   CV: negative   CNS: negative   :Negative   S/E:Negative  Renal: Negative     Physical Exam:  Head/neck: Ears: Normal TM. No obstruction. Throat: Mask. Not examined. Thyroids not palpable. Neck: No lymphadenopathy. Eyes: EOMI, PERRLA with no nystagmus  Lungs: Clear to auscultation. CV: S1-S2 normal.   ARSALAN murmur. Carotid: No bruit. Abdominal Examination: Bowel sounds present. Soft nontender. No mass no guarding or   rebound. Spine/extremities: No edema. No tenderness to palpation. Skin: No rash  CNS: Patient is alert, cooperative, moves all 4 limbs, ambulates without difficulty, light touch normal.   Good historian. Good orientation. Diabetic Foot Exam:Pulses easily palpable. No open area. No edema. Light touch and monofilament test normal.  Microalbumin: 0   Blood pressure 132/68, pulse 77, weight 236 lb (107 kg), SpO2 98 %.          Wen Vo MD

## 2021-11-16 ENCOUNTER — OFFICE VISIT (OUTPATIENT)
Dept: INTERNAL MEDICINE CLINIC | Age: 66
End: 2021-11-16
Payer: MEDICARE

## 2021-11-16 VITALS
WEIGHT: 236 LBS | SYSTOLIC BLOOD PRESSURE: 132 MMHG | HEART RATE: 77 BPM | BODY MASS INDEX: 31.14 KG/M2 | OXYGEN SATURATION: 98 % | DIASTOLIC BLOOD PRESSURE: 68 MMHG

## 2021-11-16 DIAGNOSIS — E11.9 TYPE 2 DIABETES MELLITUS WITHOUT COMPLICATION, WITHOUT LONG-TERM CURRENT USE OF INSULIN (HCC): Primary | ICD-10-CM

## 2021-11-16 DIAGNOSIS — Z23 FLU VACCINE NEED: ICD-10-CM

## 2021-11-16 DIAGNOSIS — I82.492 ACUTE DEEP VEIN THROMBOSIS (DVT) OF OTHER SPECIFIED VEIN OF LEFT LOWER EXTREMITY (HCC): ICD-10-CM

## 2021-11-16 DIAGNOSIS — E78.5 HYPERLIPIDEMIA, UNSPECIFIED HYPERLIPIDEMIA TYPE: ICD-10-CM

## 2021-11-16 DIAGNOSIS — E55.9 VITAMIN D DEFICIENCY: ICD-10-CM

## 2021-11-16 DIAGNOSIS — E66.9 CLASS 1 OBESITY WITHOUT SERIOUS COMORBIDITY WITH BODY MASS INDEX (BMI) OF 33.0 TO 33.9 IN ADULT, UNSPECIFIED OBESITY TYPE: ICD-10-CM

## 2021-11-16 DIAGNOSIS — Z12.5 SPECIAL SCREENING FOR MALIGNANT NEOPLASM OF PROSTATE: ICD-10-CM

## 2021-11-16 LAB
CREATININE URINE POCT: NORMAL
MICROALBUMIN/CREAT 24H UR: 0 MG/G{CREAT}
MICROALBUMIN/CREAT UR-RTO: NORMAL

## 2021-11-16 PROCEDURE — 99214 OFFICE O/P EST MOD 30 MIN: CPT | Performed by: INTERNAL MEDICINE

## 2021-11-16 PROCEDURE — G0008 ADMIN INFLUENZA VIRUS VAC: HCPCS | Performed by: INTERNAL MEDICINE

## 2021-11-16 PROCEDURE — 82044 UR ALBUMIN SEMIQUANTITATIVE: CPT | Performed by: INTERNAL MEDICINE

## 2021-11-16 PROCEDURE — 90694 VACC AIIV4 NO PRSRV 0.5ML IM: CPT | Performed by: INTERNAL MEDICINE

## 2022-08-04 LAB — DIABETIC RETINOPATHY: NEGATIVE

## 2022-11-11 ENCOUNTER — HOSPITAL ENCOUNTER (OUTPATIENT)
Age: 67
Discharge: HOME | End: 2022-11-11
Payer: MEDICARE

## 2022-11-11 ENCOUNTER — TELEPHONE (OUTPATIENT)
Dept: INTERNAL MEDICINE CLINIC | Age: 67
End: 2022-11-11

## 2022-11-11 DIAGNOSIS — I82.4Y2 ACUTE DEEP VEIN THROMBOSIS (DVT) OF PROXIMAL VEIN OF LEFT LOWER EXTREMITY (HCC): Primary | ICD-10-CM

## 2022-11-11 DIAGNOSIS — E78.5 HYPERLIPIDEMIA, UNSPECIFIED HYPERLIPIDEMIA TYPE: ICD-10-CM

## 2022-11-11 DIAGNOSIS — Z12.5 SPECIAL SCREENING FOR MALIGNANT NEOPLASM OF PROSTATE: ICD-10-CM

## 2022-11-11 DIAGNOSIS — E11.9 TYPE 2 DIABETES MELLITUS WITHOUT COMPLICATION, WITHOUT LONG-TERM CURRENT USE OF INSULIN (HCC): ICD-10-CM

## 2022-11-11 DIAGNOSIS — I82.4Y2 ACUTE DEEP VEIN THROMBOSIS (DVT) OF PROXIMAL VEIN OF LEFT LOWER EXTREMITY (HCC): ICD-10-CM

## 2022-11-11 DIAGNOSIS — E55.9 VITAMIN D DEFICIENCY: ICD-10-CM

## 2022-11-11 LAB
A/G RATIO: 1.7 (ref 1.1–2.2)
ALBUMIN SERPL-MCNC: 4.4 G/DL (ref 3.4–5)
ALP BLD-CCNC: 92 U/L (ref 40–129)
ALT SERPL-CCNC: 14 U/L (ref 10–40)
ANION GAP SERPL CALCULATED.3IONS-SCNC: 11 MMOL/L (ref 3–16)
AST SERPL-CCNC: 21 U/L (ref 15–37)
BILIRUB SERPL-MCNC: 1 MG/DL (ref 0–1)
BUN BLDV-MCNC: 17 MG/DL (ref 7–20)
C-REACTIVE PROTEIN: <3 MG/L (ref 0–5.1)
CALCIUM SERPL-MCNC: 9.5 MG/DL (ref 8.3–10.6)
CHLORIDE BLD-SCNC: 105 MMOL/L (ref 99–110)
CHOLESTEROL, TOTAL: 164 MG/DL (ref 0–199)
CO2: 25 MMOL/L (ref 21–32)
CREAT SERPL-MCNC: 1.3 MG/DL (ref 0.8–1.3)
ESTIMATED AVERAGE GLUCOSE: 102.5 MG/DL
GFR SERPL CREATININE-BSD FRML MDRD: 60 ML/MIN/{1.73_M2}
GLUCOSE BLD-MCNC: 104 MG/DL (ref 70–99)
HBA1C MFR BLD: 5.2 %
HCT VFR BLD CALC: 37.3 % (ref 40.5–52.5)
HDLC SERPL-MCNC: 55 MG/DL (ref 40–60)
HEMOGLOBIN: 12.7 G/DL (ref 13.5–17.5)
LDL CHOLESTEROL CALCULATED: 92 MG/DL
MCH RBC QN AUTO: 35.6 PG (ref 26–34)
MCHC RBC AUTO-ENTMCNC: 34 G/DL (ref 31–36)
MCV RBC AUTO: 104.9 FL (ref 80–100)
PDW BLD-RTO: 12.2 % (ref 12.4–15.4)
PLATELET # BLD: 165 K/UL (ref 135–450)
PMV BLD AUTO: 8.2 FL (ref 5–10.5)
POTASSIUM SERPL-SCNC: 4.8 MMOL/L (ref 3.5–5.1)
PROSTATE SPECIFIC ANTIGEN: 0.46 NG/ML (ref 0–4)
RBC # BLD: 3.56 M/UL (ref 4.2–5.9)
SODIUM BLD-SCNC: 141 MMOL/L (ref 136–145)
TOTAL PROTEIN: 7 G/DL (ref 6.4–8.2)
TRIGL SERPL-MCNC: 83 MG/DL (ref 0–150)
VITAMIN D 25-HYDROXY: 30.9 NG/ML
VLDLC SERPL CALC-MCNC: 17 MG/DL
WBC # BLD: 3.9 K/UL (ref 4–11)

## 2022-11-11 PROCEDURE — 80053 COMPREHEN METABOLIC PANEL: CPT

## 2022-11-11 PROCEDURE — 83036 HEMOGLOBIN GLYCOSYLATED A1C: CPT

## 2022-11-11 PROCEDURE — 36415 COLL VENOUS BLD VENIPUNCTURE: CPT

## 2022-11-11 PROCEDURE — 80061 LIPID PANEL: CPT

## 2022-11-11 PROCEDURE — 82306 VITAMIN D 25 HYDROXY: CPT

## 2022-11-11 PROCEDURE — 85027 COMPLETE CBC AUTOMATED: CPT

## 2022-11-11 PROCEDURE — 84153 ASSAY OF PSA TOTAL: CPT

## 2022-11-11 PROCEDURE — 86140 C-REACTIVE PROTEIN: CPT

## 2022-11-14 ENCOUNTER — TELEPHONE (OUTPATIENT)
Dept: INTERNAL MEDICINE CLINIC | Age: 67
End: 2022-11-14

## 2022-11-14 NOTE — TELEPHONE ENCOUNTER
Left voice mail for patient to call office to let us know why he missed his 11/14/22 appointment with Dr. Austin Olivo

## 2022-11-21 ENCOUNTER — OFFICE VISIT (OUTPATIENT)
Dept: INTERNAL MEDICINE CLINIC | Age: 67
End: 2022-11-21
Payer: MEDICARE

## 2022-11-21 VITALS
TEMPERATURE: 97.7 F | WEIGHT: 229.2 LBS | HEART RATE: 61 BPM | DIASTOLIC BLOOD PRESSURE: 64 MMHG | OXYGEN SATURATION: 97 % | BODY MASS INDEX: 30.24 KG/M2 | SYSTOLIC BLOOD PRESSURE: 134 MMHG

## 2022-11-21 DIAGNOSIS — R82.998 LEUKOCYTES IN URINE: ICD-10-CM

## 2022-11-21 DIAGNOSIS — E11.9 TYPE 2 DIABETES MELLITUS WITHOUT COMPLICATION, WITHOUT LONG-TERM CURRENT USE OF INSULIN (HCC): Primary | ICD-10-CM

## 2022-11-21 DIAGNOSIS — I82.492 ACUTE DEEP VEIN THROMBOSIS (DVT) OF OTHER SPECIFIED VEIN OF LEFT LOWER EXTREMITY (HCC): ICD-10-CM

## 2022-11-21 DIAGNOSIS — E78.5 HYPERLIPIDEMIA, UNSPECIFIED HYPERLIPIDEMIA TYPE: ICD-10-CM

## 2022-11-21 DIAGNOSIS — E66.9 CLASS 1 OBESITY WITHOUT SERIOUS COMORBIDITY WITH BODY MASS INDEX (BMI) OF 33.0 TO 33.9 IN ADULT, UNSPECIFIED OBESITY TYPE: ICD-10-CM

## 2022-11-21 DIAGNOSIS — E55.9 VITAMIN D DEFICIENCY: ICD-10-CM

## 2022-11-21 DIAGNOSIS — D53.9 MACROCYTIC ANEMIA: ICD-10-CM

## 2022-11-21 DIAGNOSIS — Z23 NEED FOR INFLUENZA VACCINATION: ICD-10-CM

## 2022-11-21 DIAGNOSIS — D70.8 OTHER NEUTROPENIA (HCC): ICD-10-CM

## 2022-11-21 LAB
BILIRUBIN, POC: NORMAL
BLOOD URINE, POC: NORMAL
CLARITY, POC: CLEAR
COLOR, POC: YELLOW
GLUCOSE URINE, POC: NORMAL
KETONES, POC: NORMAL
LEUKOCYTE EST, POC: NORMAL
NITRITE, POC: NORMAL
PH, POC: 5.5
PROTEIN, POC: NORMAL
SPECIFIC GRAVITY, POC: 1.02
UROBILINOGEN, POC: 0.2

## 2022-11-21 PROCEDURE — 99215 OFFICE O/P EST HI 40 MIN: CPT | Performed by: INTERNAL MEDICINE

## 2022-11-21 PROCEDURE — 81002 URINALYSIS NONAUTO W/O SCOPE: CPT | Performed by: INTERNAL MEDICINE

## 2022-11-21 PROCEDURE — 90694 VACC AIIV4 NO PRSRV 0.5ML IM: CPT | Performed by: INTERNAL MEDICINE

## 2022-11-21 PROCEDURE — G0008 ADMIN INFLUENZA VIRUS VAC: HCPCS | Performed by: INTERNAL MEDICINE

## 2022-11-21 PROCEDURE — 3044F HG A1C LEVEL LT 7.0%: CPT | Performed by: INTERNAL MEDICINE

## 2022-11-21 PROCEDURE — 1123F ACP DISCUSS/DSCN MKR DOCD: CPT | Performed by: INTERNAL MEDICINE

## 2022-11-21 SDOH — ECONOMIC STABILITY: FOOD INSECURITY: WITHIN THE PAST 12 MONTHS, YOU WORRIED THAT YOUR FOOD WOULD RUN OUT BEFORE YOU GOT MONEY TO BUY MORE.: NEVER TRUE

## 2022-11-21 SDOH — ECONOMIC STABILITY: FOOD INSECURITY: WITHIN THE PAST 12 MONTHS, THE FOOD YOU BOUGHT JUST DIDN'T LAST AND YOU DIDN'T HAVE MONEY TO GET MORE.: NEVER TRUE

## 2022-11-21 ASSESSMENT — PATIENT HEALTH QUESTIONNAIRE - PHQ9
1. LITTLE INTEREST OR PLEASURE IN DOING THINGS: 0
SUM OF ALL RESPONSES TO PHQ QUESTIONS 1-9: 0
2. FEELING DOWN, DEPRESSED OR HOPELESS: 0
SUM OF ALL RESPONSES TO PHQ QUESTIONS 1-9: 0
SUM OF ALL RESPONSES TO PHQ9 QUESTIONS 1 & 2: 0

## 2022-11-21 ASSESSMENT — SOCIAL DETERMINANTS OF HEALTH (SDOH): HOW HARD IS IT FOR YOU TO PAY FOR THE VERY BASICS LIKE FOOD, HOUSING, MEDICAL CARE, AND HEATING?: NOT HARD AT ALL

## 2022-11-21 NOTE — PROGRESS NOTES
Jayden Porras 75 y.o. male presents today for an Established patient for   Chief Complaint   Patient presents with    Follow-up    Annual Exam            ASSESSMENT/PLAN    1. Type 2 diabetes mellitus without complication, without long-term current use of insulin ([facility])               -  DIABETES FOOT EXAM      A1c 5.2 stable. At goal    2. Hyperlipidemia, unspecified hyperlipidemia type          Total cholesterol: 164. LDL cholesterol 92. Stable        3. Other neutropenia (HCC)              -Cell count low 3.9.    4. Macrocytic anemia               Hemoglobin 12.7. Hematocrit 37.3. .9 evaluate further:  - T4; Future  - TSH with Reflex; Future  - Vitamin B12; Future  - Folate; Future  - Iron and TIBC; Future  - CBC; Future  - Comprehensive Metabolic Panel; Future  - Folate; Future  - Vitamin B12; Future    5. Class 1 obesity without serious comorbidity with body mass index (BMI) of 33.0 to 33.9 in adult, unspecified obesity type     Weight down 7 pounds since 11/2021    6. Acute deep vein thrombosis (DVT) of other specified vein of left lower extremity (HCC)             No further recurrence    7. Leukocytes in urine              Trace nitrite    8. Need for influenza vaccination              Health maintenance  - Influenza, FLUAD, (age 72 y+), IM, Preservative Free, 0.5 mL    9. Vitamin D deficiency                Check labs  - Vitamin D 25 Hydroxy; Future       Return in about 6 months (around 5/21/2023) for Anemia. HPI:          I spent more than 45 minutes with this patient face-to-face evaluating, reviewing, and treating his multiple medical conditions        Reviewed last office visit with me: 11/16/2021: Type 2 diabetes A1c 5.1 improved off metformin. Hyperlipidemia stable. DVT history stable. Obesity. Medicines and Allergies Reviewed:  Reviewed Laboratory Data: 11/11/2022: Chemistry panel glucose 104 otherwise unremarkable. Lipid panel: Total cholesterol: 164. LDL cholesterol 92.   A1c 5.2.  Vitamin D30.9. CBC: WBC 3.9. Hemoglobin 12.7. Hematocrit 37.3. PSA 0.46  Reviewed Health Maintenance: Influenza vaccine. AWV. Diabetic (eye, foot). Colonoscopy 11/20/2013.  2 polyps. Diverticulosis. Redo 3 years. Dr. Siena Rudolph  Today:         11/11/2022: CBC with WBC 3.9. RBC 3.56. Hemoglobin 12.7. Hematocrit 37.3. .9. This compares to 10/19/2016 MCV of 98.4. Patient does note he is a vegetarian. Work-up obtain labs for macrocytosis    Results for orders placed or performed in visit on 11/21/22   POCT Urinalysis no Micro   Result Value Ref Range    Color, UA YELLOW     Clarity, UA CLEAR     Glucose, UA POC NEG     Bilirubin, UA NEG     Ketones, UA NEG     Spec Grav, UA 1.025     Blood, UA POC NEG     pH, UA 5.5     Protein, UA POC NEG     Urobilinogen, UA 0.2     Leukocytes, UA NEG     Nitrite, UA TRACE               ROS:  Review of Systems   Constitutional: negative   HENT: negative   EYES: negative   Respiratory: negative   Gastrointestinal: negative   Endocrine: negative   Musculoskeletal: negative   Skin: negative   Allergic/Immunological: negative   Hematological: negative   Psychiatric/Behavorial: negative   CV: negative   CNS: negative   :Negative   S/E:Negative  Renal: Negative     Physical Exam: BP: 134/64  Head/neck: Ears: Normal TM. No obstruction. Throat: Mask. Not examined. Thyroids not palpable. Neck: No lymphadenopathy. Eyes: EOMI, PERRLA with no nystagmus  Lungs: Clear to auscultation. CV: S1-S2 normal.  No murmur. Carotid: No bruit. Abdominal Examination: Bowel sounds present. Soft nontender. No mass no guarding or   rebound. Spine/extremities: No edema. No tenderness to palpation. Skin: No rash  CNS: Patient is alert, cooperative, moves all 4 limbs, ambulates without difficulty, light touch normal.  Deep tendon reflexes normal.  Good orientation.    Blood pressure 134/64, pulse 61, temperature 97.7 °F (36.5 °C), temperature source Temporal, weight 229 lb 3.2 oz (104 kg), SpO2 97 %.        Sue Burris, MD

## 2022-11-21 NOTE — PROGRESS NOTES
11/21/22-I made a call to [facility] Dr. Griselda Buss. I spoke to staff member to request colonoscopy report be faxed to our office. Gave our fax number.

## 2022-12-02 ENCOUNTER — HOSPITAL ENCOUNTER (OUTPATIENT)
Age: 67
Discharge: HOME | End: 2022-12-02
Payer: MEDICARE

## 2022-12-02 DIAGNOSIS — D53.9 MACROCYTIC ANEMIA: ICD-10-CM

## 2022-12-02 LAB
FOLATE: 8.63 NG/ML (ref 4.78–24.2)
IRON SATURATION: 43 % (ref 20–50)
IRON: 119 UG/DL (ref 59–158)
T4 TOTAL: 7.5 UG/DL (ref 4.5–10.9)
TOTAL IRON BINDING CAPACITY: 276 UG/DL (ref 260–445)
TSH REFLEX: 2.27 UIU/ML (ref 0.27–4.2)
VITAMIN B-12: 727 PG/ML (ref 211–911)

## 2022-12-02 PROCEDURE — 84443 ASSAY THYROID STIM HORMONE: CPT

## 2022-12-02 PROCEDURE — 36415 COLL VENOUS BLD VENIPUNCTURE: CPT

## 2022-12-02 PROCEDURE — 83550 IRON BINDING TEST: CPT

## 2022-12-02 PROCEDURE — 83540 ASSAY OF IRON: CPT

## 2022-12-02 PROCEDURE — 82746 ASSAY OF FOLIC ACID SERUM: CPT

## 2022-12-02 PROCEDURE — 82607 VITAMIN B-12: CPT

## 2022-12-02 PROCEDURE — 84436 ASSAY OF TOTAL THYROXINE: CPT

## 2022-12-05 ENCOUNTER — TELEPHONE (OUTPATIENT)
Dept: INTERNAL MEDICINE CLINIC | Age: 67
End: 2022-12-05

## 2022-12-05 NOTE — TELEPHONE ENCOUNTER
----- Message from Judith Rodriguez sent at 12/5/2022 11:21 AM EST -----  Subject: Results Request    QUESTIONS  Results: Labs; Ordered by:   Date Performed: 2022-12-02  ---------------------------------------------------------------------------  --------------  Dave GUAJARDO    [phone]; OK to leave message on voicemail, OK to respond with   electronic message via [employer] portal (only for patients who have   registered [employer] account)  ---------------------------------------------------------------------------  --------------

## 2022-12-06 NOTE — TELEPHONE ENCOUNTER
Please call patient. Reviewed labs from 12/2/2022: Folate 8.63 low normal.  Treat: Obtain over-the-counter:  Folate/folic acid 458 mcg a day x 2 months then stop  Vitamin B12: 727 mid normal treat: Obtain over-the-counter: B12  2000 IUs 1 a day x 2 months then stop   thyroid function test: T4 total 7.5 normal.  TSH: 2.27 normal.  Dr. Xiao Gloria

## 2023-05-31 ENCOUNTER — HOSPITAL ENCOUNTER (OUTPATIENT)
Age: 68
Discharge: HOME | End: 2023-05-31
Payer: MEDICARE

## 2023-05-31 DIAGNOSIS — D53.9 MACROCYTIC ANEMIA: ICD-10-CM

## 2023-05-31 DIAGNOSIS — E55.9 VITAMIN D DEFICIENCY: ICD-10-CM

## 2023-05-31 LAB
25(OH)D3 SERPL-MCNC: 25.5 NG/ML
ALBUMIN SERPL-MCNC: 4.2 G/DL (ref 3.4–5)
ALBUMIN/GLOB SERPL: 1.3 {RATIO} (ref 1.1–2.2)
ALP SERPL-CCNC: 91 U/L (ref 40–129)
ALT SERPL-CCNC: 13 U/L (ref 10–40)
ANION GAP SERPL CALCULATED.3IONS-SCNC: 9 MMOL/L (ref 3–16)
AST SERPL-CCNC: 20 U/L (ref 15–37)
BILIRUB SERPL-MCNC: 0.6 MG/DL (ref 0–1)
BUN SERPL-MCNC: 15 MG/DL (ref 7–20)
CALCIUM SERPL-MCNC: 9.3 MG/DL (ref 8.3–10.6)
CHLORIDE SERPL-SCNC: 106 MMOL/L (ref 99–110)
CO2 SERPL-SCNC: 25 MMOL/L (ref 21–32)
CREAT SERPL-MCNC: 1.4 MG/DL (ref 0.8–1.3)
DEPRECATED RDW RBC AUTO: 12.3 % (ref 12.4–15.4)
FOLATE SERPL-MCNC: 15.55 NG/ML (ref 4.78–24.2)
GFR SERPLBLD CREATININE-BSD FMLA CKD-EPI: 54 ML/MIN/{1.73_M2}
GLUCOSE SERPL-MCNC: 90 MG/DL (ref 70–99)
HCT VFR BLD AUTO: 35.3 % (ref 40.5–52.5)
HGB BLD-MCNC: 12.3 G/DL (ref 13.5–17.5)
MCH RBC QN AUTO: 36.6 PG (ref 26–34)
MCHC RBC AUTO-ENTMCNC: 34.9 G/DL (ref 31–36)
MCV RBC AUTO: 104.8 FL (ref 80–100)
PLATELET # BLD AUTO: 161 K/UL (ref 135–450)
PMV BLD AUTO: 8.5 FL (ref 5–10.5)
POTASSIUM SERPL-SCNC: 5.2 MMOL/L (ref 3.5–5.1)
PROT SERPL-MCNC: 7.4 G/DL (ref 6.4–8.2)
RBC # BLD AUTO: 3.37 M/UL (ref 4.2–5.9)
SODIUM SERPL-SCNC: 140 MMOL/L (ref 136–145)
VIT B12 SERPL-MCNC: 507 PG/ML (ref 211–911)
WBC # BLD AUTO: 3.7 K/UL (ref 4–11)

## 2023-05-31 PROCEDURE — 36415 COLL VENOUS BLD VENIPUNCTURE: CPT

## 2023-05-31 PROCEDURE — 82607 VITAMIN B-12: CPT

## 2023-05-31 PROCEDURE — 80053 COMPREHEN METABOLIC PANEL: CPT

## 2023-05-31 PROCEDURE — 82746 ASSAY OF FOLIC ACID SERUM: CPT

## 2023-05-31 PROCEDURE — 82306 VITAMIN D 25 HYDROXY: CPT

## 2023-05-31 PROCEDURE — 85027 COMPLETE CBC AUTOMATED: CPT

## 2023-06-05 ENCOUNTER — OFFICE VISIT (OUTPATIENT)
Dept: INTERNAL MEDICINE CLINIC | Age: 68
End: 2023-06-05

## 2023-06-05 VITALS
WEIGHT: 246 LBS | TEMPERATURE: 97.7 F | HEART RATE: 66 BPM | BODY MASS INDEX: 32.6 KG/M2 | DIASTOLIC BLOOD PRESSURE: 72 MMHG | OXYGEN SATURATION: 97 % | HEIGHT: 73 IN | SYSTOLIC BLOOD PRESSURE: 138 MMHG

## 2023-06-05 DIAGNOSIS — Z99.89 OSA ON CPAP: ICD-10-CM

## 2023-06-05 DIAGNOSIS — Z00.00 MEDICARE ANNUAL WELLNESS VISIT, SUBSEQUENT: ICD-10-CM

## 2023-06-05 DIAGNOSIS — R35.1 BPH ASSOCIATED WITH NOCTURIA: ICD-10-CM

## 2023-06-05 DIAGNOSIS — E78.5 HYPERLIPIDEMIA, UNSPECIFIED HYPERLIPIDEMIA TYPE: ICD-10-CM

## 2023-06-05 DIAGNOSIS — I82.492 ACUTE DEEP VEIN THROMBOSIS (DVT) OF OTHER SPECIFIED VEIN OF LEFT LOWER EXTREMITY (HCC): ICD-10-CM

## 2023-06-05 DIAGNOSIS — G47.33 OSA ON CPAP: ICD-10-CM

## 2023-06-05 DIAGNOSIS — E66.9 OBESITY (BMI 30-39.9): ICD-10-CM

## 2023-06-05 DIAGNOSIS — N40.1 BPH ASSOCIATED WITH NOCTURIA: ICD-10-CM

## 2023-06-05 DIAGNOSIS — E11.9 TYPE 2 DIABETES MELLITUS WITHOUT COMPLICATION, WITHOUT LONG-TERM CURRENT USE OF INSULIN (HCC): ICD-10-CM

## 2023-06-05 DIAGNOSIS — D70.8 OTHER NEUTROPENIA (HCC): Primary | ICD-10-CM

## 2023-06-05 DIAGNOSIS — D53.9 MACROCYTIC ANEMIA: ICD-10-CM

## 2023-06-05 PROBLEM — M17.12 PATELLOFEMORAL ARTHRITIS OF LEFT KNEE: Chronic | Status: RESOLVED | Noted: 2019-08-26 | Resolved: 2023-06-05

## 2023-06-05 SDOH — ECONOMIC STABILITY: FOOD INSECURITY: WITHIN THE PAST 12 MONTHS, YOU WORRIED THAT YOUR FOOD WOULD RUN OUT BEFORE YOU GOT MONEY TO BUY MORE.: NEVER TRUE

## 2023-06-05 SDOH — ECONOMIC STABILITY: HOUSING INSECURITY
IN THE LAST 12 MONTHS, WAS THERE A TIME WHEN YOU DID NOT HAVE A STEADY PLACE TO SLEEP OR SLEPT IN A SHELTER (INCLUDING NOW)?: NO

## 2023-06-05 SDOH — ECONOMIC STABILITY: INCOME INSECURITY: HOW HARD IS IT FOR YOU TO PAY FOR THE VERY BASICS LIKE FOOD, HOUSING, MEDICAL CARE, AND HEATING?: NOT HARD AT ALL

## 2023-06-05 SDOH — ECONOMIC STABILITY: FOOD INSECURITY: WITHIN THE PAST 12 MONTHS, THE FOOD YOU BOUGHT JUST DIDN'T LAST AND YOU DIDN'T HAVE MONEY TO GET MORE.: NEVER TRUE

## 2023-06-05 SDOH — HEALTH STABILITY: PHYSICAL HEALTH: ON AVERAGE, HOW MANY DAYS PER WEEK DO YOU ENGAGE IN MODERATE TO STRENUOUS EXERCISE (LIKE A BRISK WALK)?: 7 DAYS

## 2023-06-05 ASSESSMENT — PATIENT HEALTH QUESTIONNAIRE - PHQ9
SUM OF ALL RESPONSES TO PHQ QUESTIONS 1-9: 0
SUM OF ALL RESPONSES TO PHQ QUESTIONS 1-9: 0
SUM OF ALL RESPONSES TO PHQ9 QUESTIONS 1 & 2: 0
2. FEELING DOWN, DEPRESSED OR HOPELESS: 0
SUM OF ALL RESPONSES TO PHQ QUESTIONS 1-9: 0
SUM OF ALL RESPONSES TO PHQ QUESTIONS 1-9: 0
1. LITTLE INTEREST OR PLEASURE IN DOING THINGS: 0

## 2023-06-05 ASSESSMENT — LIFESTYLE VARIABLES
HOW MANY STANDARD DRINKS CONTAINING ALCOHOL DO YOU HAVE ON A TYPICAL DAY: 1 OR 2
HOW OFTEN DO YOU HAVE A DRINK CONTAINING ALCOHOL: 2-4 TIMES A MONTH

## 2023-06-05 NOTE — PATIENT INSTRUCTIONS
Learning About Activities of Daily Living  What are activities of daily living? Activities of daily living (ADLs) are the basic self-care tasks you do every day. As you age, and if you have health problems, you may find that it's harder to do these things for yourself. That's when you may need some help. Your doctor uses ADLs to measure how much help you need. Knowing what you can and can't do for yourself is an important first step to getting help. And when you have the help you need, you can stay as independent as possible. Your doctor will want to know if you are able to do tasks such as: Take a bath or shower without help. Go to the bathroom by yourself. Dress and undress without help. Shave, comb your hair, and brush teeth on your own. Get in and out of bed or a chair without help. Feed yourself without help. If you are having trouble doing basic self-care tasks, talk with your doctor. You may want to bring a caregiver or family member who can help the doctor understand your needs and abilities. How will a doctor assess your ADLs? Asking about ADLs is part of a routine health checkup your doctor will likely do as you age. Your health check might be done in a doctor's office, in your home, or at a hospital. The goal is to find out if you are having any problems that could make your health problems worse or that make it unsafe for you to be on your own. To measure your ADLs, your doctor will ask how hard it is for you to do routine tasks. He or she may also want to know if you have changed the way you do a task because of a health problem. He or she may watch how you:  Walk back and forth. Keep your balance while you stand or walk. Move from sitting to standing or from a bed to a chair. Button or unbutton a shirt or sweater. Remove and put on your shoes. It's normal to feel a little worried or anxious if you find you can't do all the things you used to be able to do.  Talking with your

## 2023-06-05 NOTE — PROGRESS NOTES
Medicare Annual Wellness Visit    Asuncion Mata is here for [employer], Discuss Labs, and Medicare AWV    Assessment & Plan   Other neutropenia ([address]  Acute deep vein thrombosis (DVT) of other specified vein of left lower extremity [facility])  Assessment & Plan:   Well-controlled, continue current medications and continue current treatment plan    Resolved. Continue to monitor. Type 2 diabetes mellitus without complication, without long-term current use of insulin (HCC)  Assessment & Plan:   Well-controlled, continue current medications and continue current treatment plan    Continue healthy diet. Discussed the pathophysiology of DM and its progressive downward trend  Reviewed the need to focus on portion control, eating across the food groups    Encouraged dietary improvement/monitoring to include less simple carbs (candies, desserts, breads/pastas) and emphasis on healthier carbs like fruits (berries) and sources of whole grain to prevent fluctuations in glucose. All of these should still be consumed in moderation, however. Exercise can help utilize excess glucose additionally and can help to lose excess weight. Encouraged updating visual screenings. Monitor for changes to feet (nonhealing wounds, sensation changes). Macrocytic anemia  -     CBC with Auto Differential; Future  -     TSH with Reflex; Future  -     Iodine, Serum; Future  -     Vitamin D 25 Hydroxy; Future  -     Sedimentation Rate; Future  -     Path Review, Smear; Future  Evaluate with further labs to be obtained before next appointment. Receiving vit b12 injections through pharmacy. DAQUAN on CPAP  Medicare annual wellness visit, subsequent  Hyperlipidemia, unspecified hyperlipidemia type    Encouraged decreasing fatty, cholesterol rich foods in the diet (I.e. Red meats, butter, whole fat milk). Dietary choices like olive oil instead of butter, baked foods instead of fried can help to further prevent future elevations.  Foods high

## 2023-08-10 DIAGNOSIS — D53.9 MACROCYTIC ANEMIA: ICD-10-CM

## 2023-08-10 LAB
25(OH)D3 SERPL-MCNC: 36.9 NG/ML
BASOPHILS # BLD: 0 K/UL (ref 0–0.2)
BASOPHILS NFR BLD: 0.3 %
DEPRECATED RDW RBC AUTO: 12.1 % (ref 12.4–15.4)
EOSINOPHIL # BLD: 0.1 K/UL (ref 0–0.6)
EOSINOPHIL NFR BLD: 2.1 %
ERYTHROCYTE [SEDIMENTATION RATE] IN BLOOD BY WESTERGREN METHOD: 14 MM/HR (ref 0–20)
HCT VFR BLD AUTO: 34.5 % (ref 40.5–52.5)
HGB BLD-MCNC: 12.1 G/DL (ref 13.5–17.5)
LYMPHOCYTES # BLD: 1.2 K/UL (ref 1–5.1)
LYMPHOCYTES NFR BLD: 24.8 %
MCH RBC QN AUTO: 36.2 PG (ref 26–34)
MCHC RBC AUTO-ENTMCNC: 35 G/DL (ref 31–36)
MCV RBC AUTO: 103.2 FL (ref 80–100)
MONOCYTES # BLD: 0.4 K/UL (ref 0–1.3)
MONOCYTES NFR BLD: 7.5 %
NEUTROPHILS # BLD: 3.2 K/UL (ref 1.7–7.7)
NEUTROPHILS NFR BLD: 65.3 %
PATH INTERP BLD-IMP: NORMAL
PLATELET # BLD AUTO: 183 K/UL (ref 135–450)
PMV BLD AUTO: 8.1 FL (ref 5–10.5)
RBC # BLD AUTO: 3.34 M/UL (ref 4.2–5.9)
TSH SERPL DL<=0.005 MIU/L-ACNC: 2.39 UIU/ML (ref 0.27–4.2)
WBC # BLD AUTO: 5 K/UL (ref 4–11)

## 2023-08-11 LAB — PATH INTERP BLD-IMP: NORMAL

## 2023-08-13 DIAGNOSIS — D53.9 MACROCYTIC ANEMIA: Primary | ICD-10-CM

## 2023-08-14 LAB — IODINE SERPL-MCNC: 57.7 UG/L (ref 40–92)

## 2023-08-15 ENCOUNTER — OFFICE VISIT (OUTPATIENT)
Dept: INTERNAL MEDICINE CLINIC | Age: 68
End: 2023-08-15

## 2023-08-15 VITALS
HEIGHT: 73 IN | DIASTOLIC BLOOD PRESSURE: 76 MMHG | WEIGHT: 244 LBS | TEMPERATURE: 97.6 F | HEART RATE: 70 BPM | BODY MASS INDEX: 32.34 KG/M2 | OXYGEN SATURATION: 99 % | SYSTOLIC BLOOD PRESSURE: 124 MMHG | RESPIRATION RATE: 12 BRPM

## 2023-08-15 DIAGNOSIS — E11.9 TYPE 2 DIABETES MELLITUS WITHOUT COMPLICATION, WITHOUT LONG-TERM CURRENT USE OF INSULIN (HCC): ICD-10-CM

## 2023-08-15 DIAGNOSIS — D53.9 MACROCYTIC ANEMIA: Primary | ICD-10-CM

## 2023-08-15 DIAGNOSIS — E78.5 HYPERLIPIDEMIA, UNSPECIFIED HYPERLIPIDEMIA TYPE: ICD-10-CM

## 2023-08-15 RX ORDER — AZELASTINE HCL 205.5 UG/1
SPRAY NASAL
COMMUNITY
Start: 2023-04-01

## 2023-08-15 SDOH — ECONOMIC STABILITY: FOOD INSECURITY: WITHIN THE PAST 12 MONTHS, YOU WORRIED THAT YOUR FOOD WOULD RUN OUT BEFORE YOU GOT MONEY TO BUY MORE.: NEVER TRUE

## 2023-08-15 SDOH — ECONOMIC STABILITY: INCOME INSECURITY: HOW HARD IS IT FOR YOU TO PAY FOR THE VERY BASICS LIKE FOOD, HOUSING, MEDICAL CARE, AND HEATING?: NOT HARD AT ALL

## 2023-08-15 SDOH — ECONOMIC STABILITY: FOOD INSECURITY: WITHIN THE PAST 12 MONTHS, THE FOOD YOU BOUGHT JUST DIDN'T LAST AND YOU DIDN'T HAVE MONEY TO GET MORE.: NEVER TRUE

## 2023-08-15 ASSESSMENT — PATIENT HEALTH QUESTIONNAIRE - PHQ9
7. TROUBLE CONCENTRATING ON THINGS, SUCH AS READING THE NEWSPAPER OR WATCHING TELEVISION: 0
6. FEELING BAD ABOUT YOURSELF - OR THAT YOU ARE A FAILURE OR HAVE LET YOURSELF OR YOUR FAMILY DOWN: 0
1. LITTLE INTEREST OR PLEASURE IN DOING THINGS: 0
3. TROUBLE FALLING OR STAYING ASLEEP: 0
SUM OF ALL RESPONSES TO PHQ QUESTIONS 1-9: 0
SUM OF ALL RESPONSES TO PHQ QUESTIONS 1-9: 0
8. MOVING OR SPEAKING SO SLOWLY THAT OTHER PEOPLE COULD HAVE NOTICED. OR THE OPPOSITE, BEING SO FIGETY OR RESTLESS THAT YOU HAVE BEEN MOVING AROUND A LOT MORE THAN USUAL: 0
SUM OF ALL RESPONSES TO PHQ QUESTIONS 1-9: 0
4. FEELING TIRED OR HAVING LITTLE ENERGY: 0
SUM OF ALL RESPONSES TO PHQ QUESTIONS 1-9: 0
5. POOR APPETITE OR OVEREATING: 0
10. IF YOU CHECKED OFF ANY PROBLEMS, HOW DIFFICULT HAVE THESE PROBLEMS MADE IT FOR YOU TO DO YOUR WORK, TAKE CARE OF THINGS AT HOME, OR GET ALONG WITH OTHER PEOPLE: 0
2. FEELING DOWN, DEPRESSED OR HOPELESS: 0
SUM OF ALL RESPONSES TO PHQ9 QUESTIONS 1 & 2: 0
9. THOUGHTS THAT YOU WOULD BE BETTER OFF DEAD, OR OF HURTING YOURSELF: 0

## 2023-08-15 ASSESSMENT — ENCOUNTER SYMPTOMS
ABDOMINAL PAIN: 0
CHEST TIGHTNESS: 0
NAUSEA: 0
VOMITING: 0
COUGH: 0
SINUS PAIN: 0
DIARRHEA: 0
CONSTIPATION: 0
SORE THROAT: 0
SHORTNESS OF BREATH: 0

## 2023-08-15 ASSESSMENT — ANXIETY QUESTIONNAIRES
IF YOU CHECKED OFF ANY PROBLEMS ON THIS QUESTIONNAIRE, HOW DIFFICULT HAVE THESE PROBLEMS MADE IT FOR YOU TO DO YOUR WORK, TAKE CARE OF THINGS AT HOME, OR GET ALONG WITH OTHER PEOPLE: NOT DIFFICULT AT ALL
6. BECOMING EASILY ANNOYED OR IRRITABLE: 0
7. FEELING AFRAID AS IF SOMETHING AWFUL MIGHT HAPPEN: 0
2. NOT BEING ABLE TO STOP OR CONTROL WORRYING: 0
1. FEELING NERVOUS, ANXIOUS, OR ON EDGE: 0
3. WORRYING TOO MUCH ABOUT DIFFERENT THINGS: 0
GAD7 TOTAL SCORE: 0
5. BEING SO RESTLESS THAT IT IS HARD TO SIT STILL: 0
4. TROUBLE RELAXING: 0

## 2023-08-15 NOTE — PROGRESS NOTES
[address] [facility]  [address] [facility]  [facility], [address] [facility]  Tel:[phone]    Kaley Mcnulty is a 70 y.o. male who presents today for his medical conditions/complaints as noted below. Kaley Mcnulty is c/o of Follow-up (6 to 8 weeks)      Chief Complaint   Patient presents with    Follow-up     6 to 8 weeks       HPI:     Mr. Ward Luna presents for follow up of macrocytic anemia which has been developing over the past several months. He states he is overall asymptomatic however is concerned as his numbers are worsening as of recently. He denies excessive alcohol intake. Denies personal or FH of autoimmune concerns. He is routinely active through walking at the [facility]. Recent colorectal CA screening showed singular hyperplastic polyp. He follows a vegan diet, however tries to eat lots of nuts, seeds, legumes. He supplements with vit B/multivitamin as well. Denies abdominal pain, nausea, vomiting, oral mucosal tenderness/swelling, muscle/joint ache, lightheadedness/weakness. No notable source of bleeding per pt, however has FH of AAA. Has not pursued screening himself. He states his sister also has a macrocytic anemia.        Past Medical History:   Diagnosis Date    Acute deep vein thrombosis (DVT) of left lower extremity ([address]) 10/21/2016    Allergic rhinitis     BPH associated with nocturia     Cardiac septal defect     NO Surgery    Diverticulosis     DVT (deep venous thrombosis) (HCC)     History of colonic polyps     Hyperlipemia     Insomnia     Obesity     DAQUAN on CPAP     Patellofemoral arthritis of left knee 8/26/2019    Plantar fasciitis     Prostatitis     Status post dilation of urethral narrowing     Type 2 diabetes mellitus without complication [facility])         Past Surgical History:   Procedure Laterality Date    COLONOSCOPY  11/2005    tubular adenoma / divertics REDO 3yrs    COLONOSCOPY  11/20/2008    Hyperplastic polyp redo 5yrs

## 2023-10-02 ENCOUNTER — HOSPITAL ENCOUNTER (OUTPATIENT)
Dept: ULTRASOUND IMAGING | Age: 68
Discharge: HOME | End: 2023-10-02
Attending: INTERNAL MEDICINE
Payer: MEDICARE

## 2023-10-02 DIAGNOSIS — I82.402 ACUTE EMBOLISM AND THROMBOSIS OF DEEP VEIN OF LEFT LOWER EXTREMITY (HCC): ICD-10-CM

## 2023-10-02 PROCEDURE — 76981 USE PARENCHYMA: CPT

## 2023-11-15 ENCOUNTER — TELEPHONE (OUTPATIENT)
Dept: INTERNAL MEDICINE CLINIC | Age: 68
End: 2023-11-15

## 2023-11-15 DIAGNOSIS — D64.9 ANEMIA, UNSPECIFIED TYPE: Primary | ICD-10-CM

## 2023-11-15 DIAGNOSIS — E11.9 TYPE 2 DIABETES MELLITUS WITHOUT COMPLICATION, WITHOUT LONG-TERM CURRENT USE OF INSULIN (HCC): ICD-10-CM

## 2023-11-15 NOTE — TELEPHONE ENCOUNTER
Called patient and left message for patient to get labs done. Left message for patient to call back. [employer] message has been sent as well.

## 2023-11-15 NOTE — TELEPHONE ENCOUNTER
Due for lipid panel/A1C check. Also recommend he obtain repeat CBC seeing previous anemia noted.  These labs have been ordered for him to obtain at his earliest convenience

## 2023-11-29 DIAGNOSIS — D64.9 ANEMIA, UNSPECIFIED TYPE: ICD-10-CM

## 2023-11-29 DIAGNOSIS — E11.9 TYPE 2 DIABETES MELLITUS WITHOUT COMPLICATION, WITHOUT LONG-TERM CURRENT USE OF INSULIN (HCC): ICD-10-CM

## 2023-11-29 LAB
BASOPHILS # BLD: 0 K/UL (ref 0–0.2)
BASOPHILS NFR BLD: 0.5 %
CHOLEST SERPL-MCNC: 143 MG/DL (ref 0–199)
DEPRECATED RDW RBC AUTO: 12.8 % (ref 12.4–15.4)
EOSINOPHIL # BLD: 0.3 K/UL (ref 0–0.6)
EOSINOPHIL NFR BLD: 5.4 %
FOLATE SERPL-MCNC: 9.22 NG/ML (ref 4.78–24.2)
HCT VFR BLD AUTO: 24.1 % (ref 40.5–52.5)
HDLC SERPL-MCNC: 50 MG/DL (ref 40–60)
HGB BLD-MCNC: 8.3 G/DL (ref 13.5–17.5)
IMMATURE RETIC FRACT: 0.49 (ref 0.21–0.37)
LDLC SERPL CALC-MCNC: 86 MG/DL
LYMPHOCYTES # BLD: 1.1 K/UL (ref 1–5.1)
LYMPHOCYTES NFR BLD: 21.2 %
MCH RBC QN AUTO: 35.4 PG (ref 26–34)
MCHC RBC AUTO-ENTMCNC: 34.3 G/DL (ref 31–36)
MCV RBC AUTO: 103.3 FL (ref 80–100)
MONOCYTES # BLD: 0.4 K/UL (ref 0–1.3)
MONOCYTES NFR BLD: 7.6 %
NEUTROPHILS # BLD: 3.5 K/UL (ref 1.7–7.7)
NEUTROPHILS NFR BLD: 65.3 %
PLATELET # BLD AUTO: 227 K/UL (ref 135–450)
PMV BLD AUTO: 8.5 FL (ref 5–10.5)
RBC # BLD AUTO: 2.33 M/UL (ref 4.2–5.9)
RETICS # AUTO: 0.04 M/UL
RETICS/RBC NFR AUTO: 1.6 % (ref 0.5–2.18)
TRIGL SERPL-MCNC: 37 MG/DL (ref 0–150)
VIT B12 SERPL-MCNC: 930 PG/ML (ref 211–911)
VLDLC SERPL CALC-MCNC: 7 MG/DL
WBC # BLD AUTO: 5.4 K/UL (ref 4–11)

## 2023-11-30 LAB
EST. AVERAGE GLUCOSE BLD GHB EST-MCNC: 114 MG/DL
HBA1C MFR BLD: 5.6 %

## 2023-12-17 DIAGNOSIS — D53.9 MACROCYTIC ANEMIA: Primary | ICD-10-CM

## 2024-07-15 LAB — DIABETIC RETINOPATHY: NEGATIVE

## 2024-07-17 ENCOUNTER — OFFICE VISIT (OUTPATIENT)
Dept: INTERNAL MEDICINE CLINIC | Age: 69
End: 2024-07-17

## 2024-07-17 VITALS
BODY MASS INDEX: 32.68 KG/M2 | HEART RATE: 75 BPM | TEMPERATURE: 98.2 F | SYSTOLIC BLOOD PRESSURE: 168 MMHG | WEIGHT: 246.6 LBS | OXYGEN SATURATION: 98 % | HEIGHT: 73 IN | DIASTOLIC BLOOD PRESSURE: 82 MMHG

## 2024-07-17 DIAGNOSIS — E11.9 TYPE 2 DIABETES MELLITUS WITHOUT COMPLICATION, WITHOUT LONG-TERM CURRENT USE OF INSULIN (HCC): ICD-10-CM

## 2024-07-17 DIAGNOSIS — D64.9 ANEMIA, UNSPECIFIED TYPE: ICD-10-CM

## 2024-07-17 DIAGNOSIS — I10 PRIMARY HYPERTENSION: Primary | ICD-10-CM

## 2024-07-17 DIAGNOSIS — Z86.718 HISTORY OF VENOUS THROMBOSIS AND EMBOLISM: ICD-10-CM

## 2024-07-17 DIAGNOSIS — D70.8 OTHER NEUTROPENIA (HCC): ICD-10-CM

## 2024-07-17 DIAGNOSIS — H35.81 MACULAR EDEMA: ICD-10-CM

## 2024-07-17 RX ORDER — VALSARTAN 40 MG/1
40 TABLET ORAL DAILY
Qty: 30 TABLET | Refills: 0 | Status: SHIPPED | OUTPATIENT
Start: 2024-07-17

## 2024-07-17 ASSESSMENT — ENCOUNTER SYMPTOMS
CONSTIPATION: 0
VOMITING: 0
SINUS PAIN: 0
NAUSEA: 0
COUGH: 0
DIARRHEA: 0
SORE THROAT: 0
SHORTNESS OF BREATH: 0
CHEST TIGHTNESS: 0

## 2024-07-17 ASSESSMENT — PATIENT HEALTH QUESTIONNAIRE - PHQ9
SUM OF ALL RESPONSES TO PHQ QUESTIONS 1-9: 0
2. FEELING DOWN, DEPRESSED OR HOPELESS: NOT AT ALL
SUM OF ALL RESPONSES TO PHQ QUESTIONS 1-9: 0
SUM OF ALL RESPONSES TO PHQ9 QUESTIONS 1 & 2: 0
SUM OF ALL RESPONSES TO PHQ QUESTIONS 1-9: 0
SUM OF ALL RESPONSES TO PHQ QUESTIONS 1-9: 0

## 2024-07-17 NOTE — PROGRESS NOTES
tablet; Take 1 tablet by mouth daily    Recommend beginning management for BP seeing end organ changes. Begin low dose valsartan to counter continual elevated BP. Encouraged decreasing sodium in the diet, weight loss, and gradual increases in exercise at least 20 minutes daily to further benefit BP. Monitor BP 2-3 times daily when beginning BP meds.     Anemia, unspecified type  -     Homocysteine; Future  -     Vitamin B12 & Folate; Future  -     CBC with Auto Differential; Future    Screen anemias once again. Did follow up with [facility] after our last visit.     Other neutropenia (HCC)    Type 2 diabetes mellitus without complication, without long-term current use of insulin (HCC)    Discussed the pathophysiology of DM and its progressive downward trend  Reviewed the need to focus on portion control, eating across the food groups    Encouraged dietary improvement/monitoring to include less simple carbs (candies, desserts, breads/pastas) and emphasis on healthier carbs like fruits (berries) and sources of whole grain to prevent fluctuations in glucose. All of these should still be consumed in moderation, however. Exercise can help utilize excess glucose additionally and can help to lose excess weight.  Encouraged updating visual screenings. Monitor for changes to feet (nonhealing wounds, sensation changes).     History of venous thrombosis and embolism    Stable. Monitor.     Return in about 4 weeks (around 8/14/2024) for Multiple Chronic Condition/retirement follow up (40 min).      Patientshould call the office immediately with new or ongoing signs or symptoms or worsening, or proceed to the emergency room.  If you are on medications which could impair your senses, you are at risk of weakness, falls,dizziness, or drowsiness.  You should be careful during activities which could place you at risk of harm, such as climbing, using stairs, operating machinery, or driving vehicles.  If you feel you cannot safely do

## 2024-08-09 DIAGNOSIS — D53.9 MACROCYTIC ANEMIA: ICD-10-CM

## 2024-08-09 DIAGNOSIS — D64.9 ANEMIA, UNSPECIFIED TYPE: ICD-10-CM

## 2024-08-09 LAB
BASOPHILS # BLD: 0 K/UL (ref 0–0.2)
BASOPHILS NFR BLD: 0.6 %
DEPRECATED RDW RBC AUTO: 12.7 % (ref 12.4–15.4)
EOSINOPHIL # BLD: 0.2 K/UL (ref 0–0.6)
EOSINOPHIL NFR BLD: 4.6 %
FOLATE SERPL-MCNC: 10.59 NG/ML (ref 4.78–24.2)
HCT VFR BLD AUTO: 26.2 % (ref 40.5–52.5)
HCYS SERPL-SCNC: 44 UMOL/L (ref 0–10)
HGB BLD-MCNC: 9 G/DL (ref 13.5–17.5)
IMMATURE RETIC FRACT: 0.52 (ref 0.21–0.37)
LYMPHOCYTES # BLD: 1.1 K/UL (ref 1–5.1)
LYMPHOCYTES NFR BLD: 24.7 %
MCH RBC QN AUTO: 35.4 PG (ref 26–34)
MCHC RBC AUTO-ENTMCNC: 34.4 G/DL (ref 31–36)
MCV RBC AUTO: 102.9 FL (ref 80–100)
MONOCYTES # BLD: 0.4 K/UL (ref 0–1.3)
MONOCYTES NFR BLD: 8.2 %
NEUTROPHILS # BLD: 2.8 K/UL (ref 1.7–7.7)
NEUTROPHILS NFR BLD: 61.9 %
PLATELET # BLD AUTO: 172 K/UL (ref 135–450)
PMV BLD AUTO: 8.4 FL (ref 5–10.5)
RBC # BLD AUTO: 2.55 M/UL (ref 4.2–5.9)
RETICS # AUTO: 0.04 M/UL
RETICS/RBC NFR AUTO: 1.49 % (ref 0.5–2.18)
VIT B12 SERPL-MCNC: 1010 PG/ML (ref 211–911)
WBC # BLD AUTO: 4.5 K/UL (ref 4–11)

## 2024-08-14 ENCOUNTER — OFFICE VISIT (OUTPATIENT)
Dept: INTERNAL MEDICINE CLINIC | Age: 69
End: 2024-08-14

## 2024-08-14 VITALS
DIASTOLIC BLOOD PRESSURE: 66 MMHG | HEART RATE: 62 BPM | WEIGHT: 246.8 LBS | BODY MASS INDEX: 32.71 KG/M2 | SYSTOLIC BLOOD PRESSURE: 156 MMHG | HEIGHT: 73 IN | OXYGEN SATURATION: 96 % | TEMPERATURE: 97.2 F

## 2024-08-14 DIAGNOSIS — E11.9 TYPE 2 DIABETES MELLITUS WITHOUT COMPLICATION, WITHOUT LONG-TERM CURRENT USE OF INSULIN (HCC): ICD-10-CM

## 2024-08-14 DIAGNOSIS — Z00.00 WELL ADULT EXAM: ICD-10-CM

## 2024-08-14 DIAGNOSIS — Z00.00 MEDICARE ANNUAL WELLNESS VISIT, SUBSEQUENT: ICD-10-CM

## 2024-08-14 DIAGNOSIS — I10 ESSENTIAL (PRIMARY) HYPERTENSION: ICD-10-CM

## 2024-08-14 DIAGNOSIS — H35.81 MACULAR EDEMA: ICD-10-CM

## 2024-08-14 DIAGNOSIS — K76.0 HEPATIC STEATOSIS: ICD-10-CM

## 2024-08-14 DIAGNOSIS — D53.9 MACROCYTIC ANEMIA: Primary | ICD-10-CM

## 2024-08-14 DIAGNOSIS — E66.9 OBESITY (BMI 30-39.9): ICD-10-CM

## 2024-08-14 DIAGNOSIS — Z86.718 HISTORY OF EMBOLISM: ICD-10-CM

## 2024-08-14 DIAGNOSIS — I10 PRIMARY HYPERTENSION: ICD-10-CM

## 2024-08-14 DIAGNOSIS — E78.5 HYPERLIPIDEMIA, UNSPECIFIED HYPERLIPIDEMIA TYPE: ICD-10-CM

## 2024-08-14 RX ORDER — VITAMIN K2 90 MCG
CAPSULE ORAL
COMMUNITY

## 2024-08-14 RX ORDER — VALSARTAN 80 MG/1
80 TABLET ORAL DAILY
Qty: 30 TABLET | Refills: 2 | Status: SHIPPED | OUTPATIENT
Start: 2024-08-14

## 2024-08-14 NOTE — PROGRESS NOTES
Medicare Annual Wellness Visit    Tylor Cochran is here for Follow-up (4 week follow up for [facility], Review labs ) and Medicare AWV    Assessment & Plan   Macrocytic anemia  -     CBC with Auto Differential; Future  -     Vitamin B12 & Folate; Future  -     Iron and TIBC; Future  -     Homocysteine; Future    Continue to replenish vit b/folate. Encouraged addition of meat protein in his diet to obtain natural sources. Continue copious vegetable intake as well    Primary hypertension  Essential (primary) hypertension  -     Homocysteine; Future  -     valsartan (DIOVAN) 80 MG tablet; Take 1 tablet by mouth daily, Disp-30 tablet, R-2Normal    BP improving although remains elevated. Slight increase in valsartan to counter BP. Encouraged decreasing sodium in the diet, weight loss, and gradual increases in exercise at least 20 minutes daily to further benefit BP    Hepatic steatosis    Continue healthy diet with elimination of foods with added sugars. Encouraged decreasing fatty, cholesterol rich foods in the diet (I.e. Red meats, butter, whole fat milk). Dietary choices like olive oil instead of butter, baked foods instead of fried can help to further prevent future elevations. Foods high in omega fatty acids can help to further decrease lipids additionally. Emphasis placed on incorporating fish, lean proteins (chicken, turkey, pork), fresh fruits and vegetables.       Hyperlipidemia, unspecified hyperlipidemia type    Encouraged decreasing fatty, cholesterol rich foods in the diet (I.e. Red meats, butter, whole fat milk). Dietary choices like olive oil instead of butter, baked foods instead of fried can help to further prevent future elevations. Foods high in omega fatty acids can help to further decrease lipids additionally. Emphasis placed on incorporating fish, lean proteins (chicken, turkey, pork), fresh fruits and vegetables.     Type 2 diabetes mellitus without complication, without long-term current use of

## 2024-08-14 NOTE — PATIENT INSTRUCTIONS
recommends aspirin, take the amount directed each day. Make sure you take aspirin and not another kind of pain reliever, such as acetaminophen (Tylenol).   When should you call for help?   Call 911 if you have symptoms of a heart attack. These may include:    Chest pain or pressure, or a strange feeling in the chest.     Sweating.     Shortness of breath.     Pain, pressure, or a strange feeling in the back, neck, jaw, or upper belly or in one or both shoulders or arms.     Lightheadedness or sudden weakness.     A fast or irregular heartbeat.   After you call 911, the  may tell you to chew 1 adult-strength or 2 to 4 low-dose aspirin. Wait for an ambulance. Do not try to drive yourself.  Watch closely for changes in your health, and be sure to contact your doctor if you have any problems.  Where can you learn more?  Go to https://www.[employer].net/patientEd and enter F075 to learn more about \"A Healthy Heart: Care Instructions.\"  Current as of: June 24, 2023  Content Version: 14.1  © 3091-5142 [employer].   Care instructions adapted under license by [employer]. If you have questions about a medical condition or this instruction, always ask your healthcare professional. [employer] disclaims any warranty or liability for your use of this information.      Personalized Preventive Plan for Tylor Cochran - 8/14/2024  Medicare offers a range of preventive health benefits. Some of the tests and screenings are paid in full while other may be subject to a deductible, co-insurance, and/or copay.    Some of these benefits include a comprehensive review of your medical history including lifestyle, illnesses that may run in your family, and various assessments and screenings as appropriate.    After reviewing your medical record and screening and assessments performed today your provider may have ordered immunizations, labs, imaging, and/or referrals for you.  A list of these orders (if

## 2024-10-28 ENCOUNTER — OFFICE VISIT (OUTPATIENT)
Dept: INTERNAL MEDICINE CLINIC | Age: 69
End: 2024-10-28

## 2024-10-28 VITALS
BODY MASS INDEX: 33.32 KG/M2 | OXYGEN SATURATION: 100 % | SYSTOLIC BLOOD PRESSURE: 159 MMHG | WEIGHT: 246 LBS | TEMPERATURE: 82 F | HEIGHT: 72 IN | DIASTOLIC BLOOD PRESSURE: 87 MMHG

## 2024-10-28 DIAGNOSIS — R07.9 CHEST PAIN, UNSPECIFIED TYPE: Primary | ICD-10-CM

## 2024-10-28 DIAGNOSIS — I10 PRIMARY HYPERTENSION: ICD-10-CM

## 2024-10-28 DIAGNOSIS — R06.02 SHORTNESS OF BREATH: ICD-10-CM

## 2024-10-28 RX ORDER — VALSARTAN 160 MG/1
160 TABLET ORAL DAILY
Qty: 30 TABLET | Refills: 1 | Status: SHIPPED | OUTPATIENT
Start: 2024-10-28

## 2024-10-28 SDOH — ECONOMIC STABILITY: FOOD INSECURITY: WITHIN THE PAST 12 MONTHS, THE FOOD YOU BOUGHT JUST DIDN'T LAST AND YOU DIDN'T HAVE MONEY TO GET MORE.: NEVER TRUE

## 2024-10-28 SDOH — ECONOMIC STABILITY: FOOD INSECURITY: WITHIN THE PAST 12 MONTHS, YOU WORRIED THAT YOUR FOOD WOULD RUN OUT BEFORE YOU GOT MONEY TO BUY MORE.: NEVER TRUE

## 2024-10-28 SDOH — ECONOMIC STABILITY: INCOME INSECURITY: HOW HARD IS IT FOR YOU TO PAY FOR THE VERY BASICS LIKE FOOD, HOUSING, MEDICAL CARE, AND HEATING?: NOT VERY HARD

## 2024-10-28 SDOH — ECONOMIC STABILITY: TRANSPORTATION INSECURITY
IN THE PAST 12 MONTHS, HAS LACK OF TRANSPORTATION KEPT YOU FROM MEETINGS, WORK, OR FROM GETTING THINGS NEEDED FOR DAILY LIVING?: NO

## 2024-10-28 SDOH — ECONOMIC STABILITY: INCOME INSECURITY: HOW HARD IS IT FOR YOU TO PAY FOR THE VERY BASICS LIKE FOOD, HOUSING, MEDICAL CARE, AND HEATING?: NOT HARD AT ALL

## 2024-10-28 NOTE — PROGRESS NOTES
[facility] Office  [address] [facility]  [address]  [place] [id number]  Tel: [phone]    Tylor Cochran  1955  male    CC:   Chief Complaint   Patient presents with    Dizziness    Fatigue       HPI:   Presents today for an acute visit for symptoms of dizziness, fatigue and chest tightness associated with shortness of breath.  States that he was recently started on valsartan about 2 and half months ago, since then he has noticed same symptoms of dizziness and fatigue at times.  Also will have what he describes as like a chest tightness worse with activity, improved with rest associated with some shortness of breath.    Has been taking his valsartan daily, usually takes it at night.  Checks his blood pressure about 3 times a day.  States that the other day was 190/90, but this morning was 135/78.  This morning was also having some of the dizziness when he first awoke.  States that he usually hovers around 155 systolic.      Past Medical History:   Diagnosis Date    Acute deep vein thrombosis (DVT) of left lower extremity (HCC) 10/21/2016    Allergic rhinitis     BPH associated with nocturia     Cardiac septal defect     NO Surgery    Diverticulosis     DVT (deep venous thrombosis) (HCC)     History of colonic polyps     Hyperlipemia     Insomnia     Obesity     DAQUAN on CPAP     DAQUAN on CPAP 10/21/2016    Patellofemoral arthritis of left knee 08/26/2019    Plantar fasciitis     Prostatitis     Status post dilation of urethral narrowing     Type 2 diabetes mellitus without complication (HCC)     Type 2 diabetes mellitus without complication (HCC)        Past Surgical History:   Procedure Laterality Date    COLONOSCOPY  11/2005    tubular adenoma / divertics REDO 3yrs    COLONOSCOPY  11/20/2008    Hyperplastic polyp redo 5yrs    COLONOSCOPY N/A 11/20/2013    Polyps(2).Divertics Redo 3 yrs Dr. Chaves    CYST REMOVAL      URETHRAL STRICTURE DILATATION         Family History   Problem Relation Age of

## 2024-10-28 NOTE — PATIENT INSTRUCTIONS
-Increase valsartan to 160 mg daily  -Continue to monitor your blood pressure, goal blood pressure around 130/80  -Have ordered a stress test to evaluate your chest tightness and shortness of breath  -Obtain fasting labs before your visit with your regular PCP SANTIAGO Moreau

## 2024-11-06 ENCOUNTER — HOSPITAL ENCOUNTER (OUTPATIENT)
Age: 69
Discharge: HOME | End: 2024-11-08
Payer: MEDICARE

## 2024-11-06 DIAGNOSIS — R06.02 SHORTNESS OF BREATH: ICD-10-CM

## 2024-11-06 DIAGNOSIS — R07.9 CHEST PAIN, UNSPECIFIED TYPE: ICD-10-CM

## 2024-11-06 LAB
STRESS BASELINE DIAS BP: 85 MMHG
STRESS BASELINE HR: 80 BPM
STRESS BASELINE SYS BP: 139 MMHG
STRESS ESTIMATED WORKLOAD: 3 METS
STRESS PEAK DIAS BP: 63 MMHG
STRESS PEAK SYS BP: 259 MMHG
STRESS PERCENT HR ACHIEVED: 91 %
STRESS POST PEAK HR: 137 BPM
STRESS RATE PRESSURE PRODUCT: NORMAL BPM*MMHG
STRESS TARGET HR: 150 BPM

## 2024-11-06 PROCEDURE — 93017 CV STRESS TEST TRACING ONLY: CPT

## 2024-11-08 ENCOUNTER — TELEPHONE (OUTPATIENT)
Dept: INTERNAL MEDICINE CLINIC | Age: 69
End: 2024-11-08

## 2024-11-08 ENCOUNTER — HOSPITAL ENCOUNTER (INPATIENT)
Age: 69
LOS: 11 days | Discharge: HOME | DRG: 660 | End: 2024-11-19
Attending: STUDENT IN AN ORGANIZED HEALTH CARE EDUCATION/TRAINING PROGRAM | Admitting: FAMILY MEDICINE
Payer: MEDICARE

## 2024-11-08 DIAGNOSIS — Z00.00 WELL ADULT EXAM: ICD-10-CM

## 2024-11-08 DIAGNOSIS — I10 ESSENTIAL (PRIMARY) HYPERTENSION: ICD-10-CM

## 2024-11-08 DIAGNOSIS — N17.9 ACUTE RENAL FAILURE, UNSPECIFIED ACUTE RENAL FAILURE TYPE (HCC): Primary | ICD-10-CM

## 2024-11-08 DIAGNOSIS — R33.9 URINARY RETENTION: ICD-10-CM

## 2024-11-08 DIAGNOSIS — N17.9 ACUTE KIDNEY INJURY (HCC): ICD-10-CM

## 2024-11-08 DIAGNOSIS — E87.5 HYPERKALEMIA: ICD-10-CM

## 2024-11-08 DIAGNOSIS — D53.9 MACROCYTIC ANEMIA: ICD-10-CM

## 2024-11-08 DIAGNOSIS — N13.30 BILATERAL HYDRONEPHROSIS: ICD-10-CM

## 2024-11-08 LAB
ALBUMIN SERPL-MCNC: 4.3 G/DL (ref 3.4–5)
ALBUMIN SERPL-MCNC: 4.4 G/DL (ref 3.4–5)
ALBUMIN/GLOB SERPL: 1.5 {RATIO} (ref 1.1–2.2)
ALBUMIN/GLOB SERPL: 1.5 {RATIO} (ref 1.1–2.2)
ALP SERPL-CCNC: 88 U/L (ref 40–129)
ALP SERPL-CCNC: 90 U/L (ref 40–129)
ALT SERPL-CCNC: 10 U/L (ref 10–40)
ALT SERPL-CCNC: 11 U/L (ref 10–40)
ANION GAP SERPL CALCULATED.3IONS-SCNC: 15 MMOL/L (ref 3–16)
ANION GAP SERPL CALCULATED.3IONS-SCNC: 16 MMOL/L (ref 3–16)
ANION GAP SERPL CALCULATED.3IONS-SCNC: 16 MMOL/L (ref 3–16)
AST SERPL-CCNC: 11 U/L (ref 15–37)
AST SERPL-CCNC: 12 U/L (ref 15–37)
BASOPHILS # BLD: 0 K/UL (ref 0–0.2)
BASOPHILS # BLD: 0 K/UL (ref 0–0.2)
BASOPHILS NFR BLD: 0.4 %
BASOPHILS NFR BLD: 0.5 %
BILIRUB SERPL-MCNC: 0.3 MG/DL (ref 0–1)
BILIRUB SERPL-MCNC: 0.4 MG/DL (ref 0–1)
BILIRUB UR QL STRIP.AUTO: NEGATIVE
BUN SERPL-MCNC: 83 MG/DL (ref 7–20)
BUN SERPL-MCNC: 84 MG/DL (ref 7–20)
BUN SERPL-MCNC: 84 MG/DL (ref 7–20)
CALCIUM SERPL-MCNC: 8.3 MG/DL (ref 8.3–10.6)
CALCIUM SERPL-MCNC: 8.7 MG/DL (ref 8.3–10.6)
CALCIUM SERPL-MCNC: 9.2 MG/DL (ref 8.3–10.6)
CHLORIDE SERPL-SCNC: 105 MMOL/L (ref 99–110)
CHLORIDE SERPL-SCNC: 106 MMOL/L (ref 99–110)
CHLORIDE SERPL-SCNC: 107 MMOL/L (ref 99–110)
CHOLEST SERPL-MCNC: 135 MG/DL (ref 0–199)
CLARITY UR: CLEAR
CO2 SERPL-SCNC: 14 MMOL/L (ref 21–32)
CO2 SERPL-SCNC: 15 MMOL/L (ref 21–32)
CO2 SERPL-SCNC: 15 MMOL/L (ref 21–32)
COLOR UR: ABNORMAL
CREAT SERPL-MCNC: 8 MG/DL (ref 0.8–1.3)
CREAT SERPL-MCNC: 8.4 MG/DL (ref 0.8–1.3)
CREAT SERPL-MCNC: 8.7 MG/DL (ref 0.8–1.3)
DEPRECATED RDW RBC AUTO: 12 % (ref 12.4–15.4)
DEPRECATED RDW RBC AUTO: 12.3 % (ref 12.4–15.4)
EOSINOPHIL # BLD: 0.3 K/UL (ref 0–0.6)
EOSINOPHIL # BLD: 0.3 K/UL (ref 0–0.6)
EOSINOPHIL NFR BLD: 4.6 %
EOSINOPHIL NFR BLD: 4.8 %
FOLATE SERPL-MCNC: 8.5 NG/ML (ref 4.78–24.2)
GFR SERPLBLD CREATININE-BSD FMLA CKD-EPI: 6 ML/MIN/{1.73_M2}
GFR SERPLBLD CREATININE-BSD FMLA CKD-EPI: 6 ML/MIN/{1.73_M2}
GFR SERPLBLD CREATININE-BSD FMLA CKD-EPI: 7 ML/MIN/{1.73_M2}
GLUCOSE BLD-MCNC: 109 MG/DL (ref 70–99)
GLUCOSE BLD-MCNC: 148 MG/DL (ref 70–99)
GLUCOSE BLD-MCNC: 150 MG/DL (ref 70–99)
GLUCOSE SERPL-MCNC: 104 MG/DL (ref 70–99)
GLUCOSE SERPL-MCNC: 110 MG/DL (ref 70–99)
GLUCOSE SERPL-MCNC: 95 MG/DL (ref 70–99)
GLUCOSE UR STRIP.AUTO-MCNC: 100 MG/DL
HCT VFR BLD AUTO: 22.1 % (ref 40.5–52.5)
HCT VFR BLD AUTO: 23.5 % (ref 40.5–52.5)
HCYS SERPL-SCNC: 69 UMOL/L (ref 0–10)
HDLC SERPL-MCNC: 42 MG/DL (ref 40–60)
HGB BLD-MCNC: 7.4 G/DL (ref 13.5–17.5)
HGB BLD-MCNC: 7.9 G/DL (ref 13.5–17.5)
HGB UR QL STRIP.AUTO: NEGATIVE
IRON SATN MFR SERPL: 50 % (ref 20–50)
IRON SERPL-MCNC: 119 UG/DL (ref 59–158)
KETONES UR STRIP.AUTO-MCNC: NEGATIVE MG/DL
LACTATE BLDV-SCNC: 0.6 MMOL/L (ref 0.4–2)
LDLC SERPL CALC-MCNC: 79 MG/DL
LEUKOCYTE ESTERASE UR QL STRIP.AUTO: NEGATIVE
LYMPHOCYTES # BLD: 1.1 K/UL (ref 1–5.1)
LYMPHOCYTES # BLD: 1.3 K/UL (ref 1–5.1)
LYMPHOCYTES NFR BLD: 20.8 %
LYMPHOCYTES NFR BLD: 21.1 %
MCH RBC QN AUTO: 34.6 PG (ref 26–34)
MCH RBC QN AUTO: 34.6 PG (ref 26–34)
MCHC RBC AUTO-ENTMCNC: 33.5 G/DL (ref 31–36)
MCHC RBC AUTO-ENTMCNC: 33.6 G/DL (ref 31–36)
MCV RBC AUTO: 102.9 FL (ref 80–100)
MCV RBC AUTO: 103.2 FL (ref 80–100)
MONOCYTES # BLD: 0.4 K/UL (ref 0–1.3)
MONOCYTES # BLD: 0.6 K/UL (ref 0–1.3)
MONOCYTES NFR BLD: 10 %
MONOCYTES NFR BLD: 8 %
NEUTROPHILS # BLD: 3.6 K/UL (ref 1.7–7.7)
NEUTROPHILS # BLD: 3.9 K/UL (ref 1.7–7.7)
NEUTROPHILS NFR BLD: 63.6 %
NEUTROPHILS NFR BLD: 66.2 %
NITRITE UR QL STRIP.AUTO: NEGATIVE
PERFORMED ON: ABNORMAL
PH UR STRIP.AUTO: 6 [PH] (ref 5–8)
PLATELET # BLD AUTO: 217 K/UL (ref 135–450)
PLATELET # BLD AUTO: 225 K/UL (ref 135–450)
PMV BLD AUTO: 7.1 FL (ref 5–10.5)
PMV BLD AUTO: 8.8 FL (ref 5–10.5)
POTASSIUM SERPL-SCNC: 5.5 MMOL/L (ref 3.5–5.1)
POTASSIUM SERPL-SCNC: 5.8 MMOL/L (ref 3.5–5.1)
POTASSIUM SERPL-SCNC: 6.2 MMOL/L (ref 3.5–5.1)
PROT SERPL-MCNC: 7.2 G/DL (ref 6.4–8.2)
PROT SERPL-MCNC: 7.4 G/DL (ref 6.4–8.2)
PROT UR STRIP.AUTO-MCNC: NEGATIVE MG/DL
PSA SERPL DL<=0.01 NG/ML-MCNC: 0.6 NG/ML (ref 0–4)
RBC # BLD AUTO: 2.15 M/UL (ref 4.2–5.9)
RBC # BLD AUTO: 2.28 M/UL (ref 4.2–5.9)
SODIUM SERPL-SCNC: 135 MMOL/L (ref 136–145)
SODIUM SERPL-SCNC: 136 MMOL/L (ref 136–145)
SODIUM SERPL-SCNC: 138 MMOL/L (ref 136–145)
SP GR UR STRIP.AUTO: 1.01 (ref 1–1.03)
TIBC SERPL-MCNC: 239 UG/DL (ref 260–445)
TRIGL SERPL-MCNC: 69 MG/DL (ref 0–150)
TSH SERPL DL<=0.005 MIU/L-ACNC: 1.82 UIU/ML (ref 0.27–4.2)
UA COMPLETE W REFLEX CULTURE PNL UR: ABNORMAL
UA DIPSTICK W REFLEX MICRO PNL UR: ABNORMAL
URN SPEC COLLECT METH UR: ABNORMAL
UROBILINOGEN UR STRIP-ACNC: 0.2 E.U./DL
VIT B12 SERPL-MCNC: 1178 PG/ML (ref 211–911)
VLDLC SERPL CALC-MCNC: 14 MG/DL
WBC # BLD AUTO: 5.5 K/UL (ref 4–11)
WBC # BLD AUTO: 6.1 K/UL (ref 4–11)

## 2024-11-08 PROCEDURE — 85025 COMPLETE CBC W/AUTO DIFF WBC: CPT

## 2024-11-08 PROCEDURE — 6370000000 HC RX 637 (ALT 250 FOR IP): Performed by: FAMILY MEDICINE

## 2024-11-08 PROCEDURE — 96361 HYDRATE IV INFUSION ADD-ON: CPT

## 2024-11-08 PROCEDURE — 2580000003 HC RX 258: Performed by: STUDENT IN AN ORGANIZED HEALTH CARE EDUCATION/TRAINING PROGRAM

## 2024-11-08 PROCEDURE — 2580000003 HC RX 258: Performed by: FAMILY MEDICINE

## 2024-11-08 PROCEDURE — 80053 COMPREHEN METABOLIC PANEL: CPT

## 2024-11-08 PROCEDURE — 6360000002 HC RX W HCPCS: Performed by: STUDENT IN AN ORGANIZED HEALTH CARE EDUCATION/TRAINING PROGRAM

## 2024-11-08 PROCEDURE — 1200000000 HC SEMI PRIVATE

## 2024-11-08 PROCEDURE — 93005 ELECTROCARDIOGRAM TRACING: CPT | Performed by: STUDENT IN AN ORGANIZED HEALTH CARE EDUCATION/TRAINING PROGRAM

## 2024-11-08 PROCEDURE — 83605 ASSAY OF LACTIC ACID: CPT

## 2024-11-08 PROCEDURE — 6370000000 HC RX 637 (ALT 250 FOR IP): Performed by: STUDENT IN AN ORGANIZED HEALTH CARE EDUCATION/TRAINING PROGRAM

## 2024-11-08 PROCEDURE — 81003 URINALYSIS AUTO W/O SCOPE: CPT

## 2024-11-08 PROCEDURE — 99285 EMERGENCY DEPT VISIT HI MDM: CPT

## 2024-11-08 PROCEDURE — 96375 TX/PRO/DX INJ NEW DRUG ADDON: CPT

## 2024-11-08 PROCEDURE — 96374 THER/PROPH/DIAG INJ IV PUSH: CPT

## 2024-11-08 RX ORDER — POLYETHYLENE GLYCOL 3350 17 G/17G
17 POWDER, FOR SOLUTION ORAL DAILY PRN
Status: DISCONTINUED | OUTPATIENT
Start: 2024-11-08 | End: 2024-11-19 | Stop reason: HOSPADM

## 2024-11-08 RX ORDER — ACETAMINOPHEN 325 MG/1
650 TABLET ORAL EVERY 6 HOURS PRN
Status: DISCONTINUED | OUTPATIENT
Start: 2024-11-08 | End: 2024-11-19 | Stop reason: HOSPADM

## 2024-11-08 RX ORDER — SODIUM CHLORIDE 0.9 % (FLUSH) 0.9 %
5-40 SYRINGE (ML) INJECTION EVERY 12 HOURS SCHEDULED
Status: DISCONTINUED | OUTPATIENT
Start: 2024-11-08 | End: 2024-11-19 | Stop reason: HOSPADM

## 2024-11-08 RX ORDER — CALCIUM GLUCONATE 20 MG/ML
1000 INJECTION, SOLUTION INTRAVENOUS ONCE
Status: COMPLETED | OUTPATIENT
Start: 2024-11-08 | End: 2024-11-08

## 2024-11-08 RX ORDER — SODIUM CHLORIDE 9 MG/ML
INJECTION, SOLUTION INTRAVENOUS PRN
Status: DISCONTINUED | OUTPATIENT
Start: 2024-11-08 | End: 2024-11-19 | Stop reason: HOSPADM

## 2024-11-08 RX ORDER — HEPARIN SODIUM 5000 [USP'U]/ML
5000 INJECTION, SOLUTION INTRAVENOUS; SUBCUTANEOUS EVERY 8 HOURS SCHEDULED
Status: DISCONTINUED | OUTPATIENT
Start: 2024-11-09 | End: 2024-11-19 | Stop reason: HOSPADM

## 2024-11-08 RX ORDER — ONDANSETRON 4 MG/1
4 TABLET, ORALLY DISINTEGRATING ORAL EVERY 8 HOURS PRN
Status: DISCONTINUED | OUTPATIENT
Start: 2024-11-08 | End: 2024-11-19 | Stop reason: HOSPADM

## 2024-11-08 RX ORDER — ACETAMINOPHEN 650 MG/1
650 SUPPOSITORY RECTAL EVERY 6 HOURS PRN
Status: DISCONTINUED | OUTPATIENT
Start: 2024-11-08 | End: 2024-11-19 | Stop reason: HOSPADM

## 2024-11-08 RX ORDER — SODIUM CHLORIDE 0.9 % (FLUSH) 0.9 %
5-40 SYRINGE (ML) INJECTION PRN
Status: DISCONTINUED | OUTPATIENT
Start: 2024-11-08 | End: 2024-11-19 | Stop reason: HOSPADM

## 2024-11-08 RX ORDER — DEXTROSE MONOHYDRATE 100 MG/ML
INJECTION, SOLUTION INTRAVENOUS CONTINUOUS PRN
Status: DISCONTINUED | OUTPATIENT
Start: 2024-11-08 | End: 2024-11-19 | Stop reason: HOSPADM

## 2024-11-08 RX ORDER — 0.9 % SODIUM CHLORIDE 0.9 %
1000 INTRAVENOUS SOLUTION INTRAVENOUS ONCE
Status: COMPLETED | OUTPATIENT
Start: 2024-11-08 | End: 2024-11-08

## 2024-11-08 RX ORDER — GLUCAGON 1 MG/ML
1 KIT INJECTION PRN
Status: DISCONTINUED | OUTPATIENT
Start: 2024-11-08 | End: 2024-11-19 | Stop reason: HOSPADM

## 2024-11-08 RX ORDER — SODIUM CHLORIDE, SODIUM LACTATE, POTASSIUM CHLORIDE, CALCIUM CHLORIDE 600; 310; 30; 20 MG/100ML; MG/100ML; MG/100ML; MG/100ML
INJECTION, SOLUTION INTRAVENOUS CONTINUOUS
Status: DISCONTINUED | OUTPATIENT
Start: 2024-11-08 | End: 2024-11-09

## 2024-11-08 RX ORDER — ONDANSETRON 2 MG/ML
4 INJECTION INTRAMUSCULAR; INTRAVENOUS EVERY 6 HOURS PRN
Status: DISCONTINUED | OUTPATIENT
Start: 2024-11-08 | End: 2024-11-19 | Stop reason: HOSPADM

## 2024-11-08 RX ORDER — FUROSEMIDE 10 MG/ML
40 INJECTION INTRAMUSCULAR; INTRAVENOUS ONCE
Status: COMPLETED | OUTPATIENT
Start: 2024-11-08 | End: 2024-11-08

## 2024-11-08 RX ADMIN — SODIUM ZIRCONIUM CYCLOSILICATE 10 G: 10 POWDER, FOR SUSPENSION ORAL at 19:06

## 2024-11-08 RX ADMIN — DEXTROSE MONOHYDRATE 250 ML: 100 INJECTION, SOLUTION INTRAVENOUS at 19:36

## 2024-11-08 RX ADMIN — SODIUM CHLORIDE 1000 ML: 9 INJECTION, SOLUTION INTRAVENOUS at 18:54

## 2024-11-08 RX ADMIN — SODIUM ZIRCONIUM CYCLOSILICATE 10 G: 10 POWDER, FOR SUSPENSION ORAL at 23:32

## 2024-11-08 RX ADMIN — CALCIUM GLUCONATE 1000 MG: 20 INJECTION, SOLUTION INTRAVENOUS at 18:54

## 2024-11-08 RX ADMIN — INSULIN HUMAN 5 UNITS: 100 INJECTION, SOLUTION PARENTERAL at 19:10

## 2024-11-08 RX ADMIN — SODIUM CHLORIDE, POTASSIUM CHLORIDE, SODIUM LACTATE AND CALCIUM CHLORIDE: 600; 310; 30; 20 INJECTION, SOLUTION INTRAVENOUS at 23:31

## 2024-11-08 RX ADMIN — FUROSEMIDE 40 MG: 10 INJECTION, SOLUTION INTRAMUSCULAR; INTRAVENOUS at 18:59

## 2024-11-08 RX ADMIN — ACETAMINOPHEN 650 MG: 325 TABLET ORAL at 23:41

## 2024-11-08 ASSESSMENT — PAIN DESCRIPTION - LOCATION: LOCATION: HEAD

## 2024-11-08 ASSESSMENT — PAIN - FUNCTIONAL ASSESSMENT
PAIN_FUNCTIONAL_ASSESSMENT: NONE - DENIES PAIN
PAIN_FUNCTIONAL_ASSESSMENT: 0-10

## 2024-11-08 ASSESSMENT — PAIN SCALES - GENERAL
PAINLEVEL_OUTOF10: 1
PAINLEVEL_OUTOF10: 3

## 2024-11-08 ASSESSMENT — LIFESTYLE VARIABLES
HOW OFTEN DO YOU HAVE A DRINK CONTAINING ALCOHOL: MONTHLY OR LESS
HOW MANY STANDARD DRINKS CONTAINING ALCOHOL DO YOU HAVE ON A TYPICAL DAY: PATIENT DOES NOT DRINK

## 2024-11-08 NOTE — TELEPHONE ENCOUNTER
Let patient know his stress test was normal. He can follow up with Isidoro as scheduled next week.

## 2024-11-08 NOTE — TELEPHONE ENCOUNTER
Called and spoke to patient. Patient was advised of the previous message.Patient voiced understanding. Patient denied any questions.

## 2024-11-08 NOTE — ED PROVIDER NOTES
[facility]  ED  EMERGENCY DEPARTMENT ENCOUNTER        Patient Name: Tylor Cochran  MRN: [id number]  Birthdate 1955  Date of evaluation: 11/8/2024  Attending Provider: Davonte Warren MD  Resident Provider: Jag Stinson DO  PCP: Isidoro Guaman APRN - CNP  Note Started: 6:10 PM EST 11/8/24    CHIEF COMPLAINT       abnormal labs (Pt was seen by primary care, stated his BUN, and creatinine were elevated. Was told to come in.)      HISTORY OF PRESENT ILLNESS: 1 or more Elements     History from : Patient    Limitations to history : None    Tylor Cochran is a 69 y.o. male with a past medical history significant for hyperlipidemia, cardiac septal defect, BPH, obstructive sleep apnea, type 2 diabetes mellitus, and neutropenia who presents abnormal BUN and creatinine. Patient states that he went to his primary care provider for his yearly labs and was notified that his BUN and creatinine were elevated. His PCP recommended that he go to the ED for further evaluation.  The patient started valsartan 160 mg 3 months ago.  He denies a history of kidney disease. Patient states that he had chest pain and had a stress test performed a couple days ago that came back normal. He also admits to dizziness and lightheadedness that occurs in \"waves\" - usually in the morning (he feels like the room is spinning). Patient denies fever, chills, nausea, vomiting, chest pain, shortness of breath, headache, dizziness, visual changes, flank pain, hematuria, and diarrhea. Patient states that he sometimes gets constipation but he has an abdominal hernia.  Patient has a history of DVTs in his left lower extremity.    Nursing Notes were all reviewed and agreed with or any disagreements were addressed in the HPI.    REVIEW OF SYSTEMS :      Positives and Pertinent negatives as per HPI.     SURGICAL HISTORY     Past Surgical History:   Procedure Laterality Date    COLONOSCOPY  11/2005    tubular adenoma / divertics REDO  Regular rate and rhythm. Radial pulses are intact bilaterally.   Chest: No respiratory distress. Clear breath sounds bilaterally. No increased work of breathing. No use of accessory muscles for respiration.   Abdomen: Soft, nontender, nondistended, non-peritonitic.   Extremities: Left lower extremity is slightly more edematous than right lower extremity  Neuro: Moving all extremities. No focal deficits. Speech is clear.   Skin: No rash, no erythema  Psych: Calm and cooperative.    DIAGNOSTIC RESULTS   LABS:    Labs Reviewed   CBC WITH AUTO DIFFERENTIAL - Abnormal; Notable for the following components:       Result Value    RBC 2.15 (*)     Hemoglobin 7.4 (*)     Hematocrit 22.1 (*)     .9 (*)     MCH 34.6 (*)     RDW 12.3 (*)     All other components within normal limits   COMPREHENSIVE METABOLIC PANEL W/ REFLEX TO MG FOR LOW K - Abnormal; Notable for the following components:    Potassium reflex Magnesium 6.2 (*)     CO2 15 (*)     Glucose 110 (*)     BUN 84 (*)     Creatinine 8.7 (*)     Est, Glom Filt Rate 6 (*)     AST 12 (*)     All other components within normal limits    Narrative:     CALL  Muller  [facility] tel. [phone],  Chemistry results called to and read back by ANAHY Hill, 11/08/2024  18:44, by MIRLANDE   URINALYSIS WITH REFLEX TO CULTURE - Abnormal; Notable for the following components:    Glucose, Ur 100 (*)     All other components within normal limits   BASIC METABOLIC PANEL W/ REFLEX TO MG FOR LOW K - Abnormal; Notable for the following components:    Sodium 135 (*)     Potassium reflex Magnesium 5.5 (*)     CO2 14 (*)     Glucose 104 (*)     BUN 84 (*)     Creatinine 8.0 (*)     Est, Glom Filt Rate 7 (*)     All other components within normal limits    Narrative:     CALL  Muller  [facility] tel. [phone],  Chemistry results called to and read back by ANAHY Hill, 11/08/2024  21:51, by MIRLANDE   POCT GLUCOSE - Abnormal; Notable for the following components:    POC Glucose 109 (*)     All

## 2024-11-08 NOTE — ED PROVIDER NOTES
I independently performed a history and physical on Tylor Cochran.     I have discussed the case with the NAYELI/resident at 1841 and approve / take responsibility for the initial management plan and anticipated disposition as documented below.     In summary the patient presents with abnormal lab work with evidence acute renal failure elevated BUN/creatinine and hyperkalemia.  Here the patient is otherwise asymptomatic.  On my evaluation the patient is mildly hypertensive otherwise afebrile hemodynamically stable.  Breath sounds are clear abdomen is soft nontender nondistended extremities are warm and well-perfused.  He again has no acute complaints.  He states that he makes urine without too much difficulty though does have a history of enlarged prostate workup was initiated to characterize his condition and reaffirms that he is suffering acute renal failure complicated by hyperkalemia.  Will initiate routine medical management and admit for further care in stable condition    I interpreted the following studies:    ECG: Sinus rhythm at a rate of 81 without ectopy.  Normal axis.  First-degree AV block KERA 252 otherwise normal intervals.  No evidence of acute ischemia.  There are peaked T waves throughout the majority of leads consistent with his known hyperkalemia.     I personally discussed the patient's management with the following:  emir        For further details of Tylor Cochran's emergency department encounter, please see the NAYELI/resident's documentation. Please note the signature time recorded here indicates the limit of my supervision of this case and should the patient require further management prior to disposition I have signed the case out to my colleague Davonte Lugo MD  11/08/24 [id number]

## 2024-11-09 ENCOUNTER — APPOINTMENT (OUTPATIENT)
Dept: ULTRASOUND IMAGING | Age: 69
DRG: 660 | End: 2024-11-09
Payer: MEDICARE

## 2024-11-09 LAB
ALBUMIN SERPL-MCNC: 3.8 G/DL (ref 3.4–5)
ANION GAP SERPL CALCULATED.3IONS-SCNC: 13 MMOL/L (ref 3–16)
ANION GAP SERPL CALCULATED.3IONS-SCNC: 14 MMOL/L (ref 3–16)
ANION GAP SERPL CALCULATED.3IONS-SCNC: 16 MMOL/L (ref 3–16)
BASOPHILS # BLD: 0 K/UL (ref 0–0.2)
BASOPHILS NFR BLD: 0.5 %
BUN SERPL-MCNC: 79 MG/DL (ref 7–20)
BUN SERPL-MCNC: 82 MG/DL (ref 7–20)
BUN SERPL-MCNC: 83 MG/DL (ref 7–20)
CALCIUM SERPL-MCNC: 8.3 MG/DL (ref 8.3–10.6)
CHLORIDE SERPL-SCNC: 104 MMOL/L (ref 99–110)
CHLORIDE SERPL-SCNC: 107 MMOL/L (ref 99–110)
CHLORIDE SERPL-SCNC: 109 MMOL/L (ref 99–110)
CK SERPL-CCNC: 55 U/L (ref 39–308)
CO2 SERPL-SCNC: 15 MMOL/L (ref 21–32)
CREAT SERPL-MCNC: 8.3 MG/DL (ref 0.8–1.3)
CREAT SERPL-MCNC: 8.7 MG/DL (ref 0.8–1.3)
CREAT SERPL-MCNC: 8.8 MG/DL (ref 0.8–1.3)
DEPRECATED RDW RBC AUTO: 12.1 % (ref 12.4–15.4)
EKG ATRIAL RATE: 81 BPM
EKG DIAGNOSIS: NORMAL
EKG P AXIS: 59 DEGREES
EKG P-R INTERVAL: 252 MS
EKG Q-T INTERVAL: 342 MS
EKG QRS DURATION: 102 MS
EKG QTC CALCULATION (BAZETT): 397 MS
EKG R AXIS: 1 DEGREES
EKG T AXIS: 37 DEGREES
EKG VENTRICULAR RATE: 81 BPM
EOSINOPHIL # BLD: 0.3 K/UL (ref 0–0.6)
EOSINOPHIL NFR BLD: 5.5 %
EST. AVERAGE GLUCOSE BLD GHB EST-MCNC: 105.4 MG/DL
GFR SERPLBLD CREATININE-BSD FMLA CKD-EPI: 6 ML/MIN/{1.73_M2}
GLUCOSE BLD-MCNC: 129 MG/DL (ref 70–99)
GLUCOSE BLD-MCNC: 195 MG/DL (ref 70–99)
GLUCOSE BLD-MCNC: 96 MG/DL (ref 70–99)
GLUCOSE SERPL-MCNC: 107 MG/DL (ref 70–99)
GLUCOSE SERPL-MCNC: 86 MG/DL (ref 70–99)
GLUCOSE SERPL-MCNC: 90 MG/DL (ref 70–99)
HBA1C MFR BLD: 5.3 %
HCT VFR BLD AUTO: 21.8 % (ref 40.5–52.5)
HGB BLD-MCNC: 7.1 G/DL (ref 13.5–17.5)
LYMPHOCYTES # BLD: 1.1 K/UL (ref 1–5.1)
LYMPHOCYTES NFR BLD: 23.6 %
MCH RBC QN AUTO: 33.8 PG (ref 26–34)
MCHC RBC AUTO-ENTMCNC: 32.6 G/DL (ref 31–36)
MCV RBC AUTO: 103.6 FL (ref 80–100)
MONOCYTES # BLD: 0.4 K/UL (ref 0–1.3)
MONOCYTES NFR BLD: 9.1 %
NEUTROPHILS # BLD: 2.9 K/UL (ref 1.7–7.7)
NEUTROPHILS NFR BLD: 61.3 %
PERFORMED ON: ABNORMAL
PERFORMED ON: ABNORMAL
PERFORMED ON: NORMAL
PHOSPHATE SERPL-MCNC: 5.4 MG/DL (ref 2.5–4.9)
PHOSPHATE SERPL-MCNC: 6 MG/DL (ref 2.5–4.9)
PLATELET # BLD AUTO: 180 K/UL (ref 135–450)
PMV BLD AUTO: 7.7 FL (ref 5–10.5)
POTASSIUM SERPL-SCNC: 5.1 MMOL/L (ref 3.5–5.1)
POTASSIUM SERPL-SCNC: 5.6 MMOL/L (ref 3.5–5.1)
POTASSIUM SERPL-SCNC: 5.7 MMOL/L (ref 3.5–5.1)
RBC # BLD AUTO: 2.1 M/UL (ref 4.2–5.9)
SODIUM SERPL-SCNC: 135 MMOL/L (ref 136–145)
SODIUM SERPL-SCNC: 136 MMOL/L (ref 136–145)
SODIUM SERPL-SCNC: 137 MMOL/L (ref 136–145)
WBC # BLD AUTO: 4.7 K/UL (ref 4–11)

## 2024-11-09 PROCEDURE — 83521 IG LIGHT CHAINS FREE EACH: CPT

## 2024-11-09 PROCEDURE — 6370000000 HC RX 637 (ALT 250 FOR IP): Performed by: FAMILY MEDICINE

## 2024-11-09 PROCEDURE — 51702 INSERT TEMP BLADDER CATH: CPT

## 2024-11-09 PROCEDURE — 80069 RENAL FUNCTION PANEL: CPT

## 2024-11-09 PROCEDURE — 36415 COLL VENOUS BLD VENIPUNCTURE: CPT

## 2024-11-09 PROCEDURE — 84100 ASSAY OF PHOSPHORUS: CPT

## 2024-11-09 PROCEDURE — 1200000000 HC SEMI PRIVATE

## 2024-11-09 PROCEDURE — 86334 IMMUNOFIX E-PHORESIS SERUM: CPT

## 2024-11-09 PROCEDURE — 2580000003 HC RX 258: Performed by: INTERNAL MEDICINE

## 2024-11-09 PROCEDURE — 6370000000 HC RX 637 (ALT 250 FOR IP): Performed by: INTERNAL MEDICINE

## 2024-11-09 PROCEDURE — 82784 ASSAY IGA/IGD/IGG/IGM EACH: CPT

## 2024-11-09 PROCEDURE — 93010 ELECTROCARDIOGRAM REPORT: CPT | Performed by: INTERNAL MEDICINE

## 2024-11-09 PROCEDURE — 82746 ASSAY OF FOLIC ACID SERUM: CPT

## 2024-11-09 PROCEDURE — 84155 ASSAY OF PROTEIN SERUM: CPT

## 2024-11-09 PROCEDURE — 84165 PROTEIN E-PHORESIS SERUM: CPT

## 2024-11-09 PROCEDURE — 85025 COMPLETE CBC W/AUTO DIFF WBC: CPT

## 2024-11-09 PROCEDURE — 6370000000 HC RX 637 (ALT 250 FOR IP): Performed by: NURSE PRACTITIONER

## 2024-11-09 PROCEDURE — 76770 US EXAM ABDO BACK WALL COMP: CPT

## 2024-11-09 PROCEDURE — 82550 ASSAY OF CK (CPK): CPT

## 2024-11-09 PROCEDURE — 2500000003 HC RX 250 WO HCPCS: Performed by: INTERNAL MEDICINE

## 2024-11-09 PROCEDURE — 6360000002 HC RX W HCPCS: Performed by: FAMILY MEDICINE

## 2024-11-09 RX ORDER — SODIUM CHLORIDE, SODIUM LACTATE, POTASSIUM CHLORIDE, CALCIUM CHLORIDE 600; 310; 30; 20 MG/100ML; MG/100ML; MG/100ML; MG/100ML
INJECTION, SOLUTION INTRAVENOUS CONTINUOUS
Status: DISCONTINUED | OUTPATIENT
Start: 2024-11-09 | End: 2024-11-09

## 2024-11-09 RX ORDER — TAMSULOSIN HYDROCHLORIDE 0.4 MG/1
0.4 CAPSULE ORAL DAILY
Status: DISCONTINUED | OUTPATIENT
Start: 2024-11-09 | End: 2024-11-19 | Stop reason: HOSPADM

## 2024-11-09 RX ORDER — CLONIDINE HYDROCHLORIDE 0.1 MG/1
0.1 TABLET ORAL EVERY 4 HOURS PRN
Status: DISCONTINUED | OUTPATIENT
Start: 2024-11-09 | End: 2024-11-19 | Stop reason: HOSPADM

## 2024-11-09 RX ORDER — CETIRIZINE HYDROCHLORIDE 10 MG/1
10 TABLET ORAL DAILY
Status: DISCONTINUED | OUTPATIENT
Start: 2024-11-09 | End: 2024-11-19 | Stop reason: HOSPADM

## 2024-11-09 RX ORDER — AMLODIPINE BESYLATE 5 MG/1
5 TABLET ORAL DAILY
Status: DISCONTINUED | OUTPATIENT
Start: 2024-11-09 | End: 2024-11-15

## 2024-11-09 RX ORDER — SODIUM CHLORIDE 9 MG/ML
INJECTION, SOLUTION INTRAVENOUS CONTINUOUS
Status: DISCONTINUED | OUTPATIENT
Start: 2024-11-09 | End: 2024-11-09

## 2024-11-09 RX ORDER — MECOBALAMIN 5000 MCG
10 TABLET,DISINTEGRATING ORAL NIGHTLY
Status: DISCONTINUED | OUTPATIENT
Start: 2024-11-09 | End: 2024-11-19 | Stop reason: HOSPADM

## 2024-11-09 RX ADMIN — ACETAMINOPHEN 650 MG: 325 TABLET ORAL at 19:32

## 2024-11-09 RX ADMIN — TAMSULOSIN HYDROCHLORIDE 0.4 MG: 0.4 CAPSULE ORAL at 13:50

## 2024-11-09 RX ADMIN — HEPARIN SODIUM 5000 UNITS: 5000 INJECTION INTRAVENOUS; SUBCUTANEOUS at 05:58

## 2024-11-09 RX ADMIN — SODIUM BICARBONATE: 84 INJECTION, SOLUTION INTRAVENOUS at 09:28

## 2024-11-09 RX ADMIN — SODIUM ZIRCONIUM CYCLOSILICATE 10 G: 10 POWDER, FOR SUSPENSION ORAL at 08:17

## 2024-11-09 RX ADMIN — HEPARIN SODIUM 5000 UNITS: 5000 INJECTION INTRAVENOUS; SUBCUTANEOUS at 20:35

## 2024-11-09 RX ADMIN — SODIUM CHLORIDE, POTASSIUM CHLORIDE, SODIUM LACTATE AND CALCIUM CHLORIDE: 600; 310; 30; 20 INJECTION, SOLUTION INTRAVENOUS at 07:22

## 2024-11-09 RX ADMIN — Medication 10 MG: at 20:35

## 2024-11-09 RX ADMIN — SODIUM BICARBONATE: 84 INJECTION, SOLUTION INTRAVENOUS at 20:35

## 2024-11-09 RX ADMIN — SALINE NASAL SPRAY 1 SPRAY: 1.5 SOLUTION NASAL at 19:33

## 2024-11-09 RX ADMIN — CETIRIZINE HYDROCHLORIDE 10 MG: 10 TABLET, FILM COATED ORAL at 19:33

## 2024-11-09 RX ADMIN — HEPARIN SODIUM 5000 UNITS: 5000 INJECTION INTRAVENOUS; SUBCUTANEOUS at 13:52

## 2024-11-09 RX ADMIN — SODIUM ZIRCONIUM CYCLOSILICATE 10 G: 10 POWDER, FOR SUSPENSION ORAL at 13:51

## 2024-11-09 RX ADMIN — AMLODIPINE BESYLATE 5 MG: 5 TABLET ORAL at 09:44

## 2024-11-09 ASSESSMENT — PAIN DESCRIPTION - LOCATION: LOCATION: HEAD

## 2024-11-09 ASSESSMENT — PAIN SCALES - GENERAL: PAINLEVEL_OUTOF10: 3

## 2024-11-09 NOTE — PROGRESS NOTES
4 Eyes Skin Assessment     NAME:  Tylor Cochran  YOB: 1955  MEDICAL RECORD NUMBER:  [id number]    The patient is being assessed for  Admission    I agree that at least one RN has performed a thorough Head to Toe Skin Assessment on the patient. ALL assessment sites listed below have been assessed.      Areas assessed by both nurses:    Head, Face, Ears, Shoulders, Back, Chest, Arms, Elbows, Hands, Sacrum. Buttock, Coccyx, Ischium, Legs. Feet and Heels, and Under Medical Devices         Does the Patient have a Wound? No noted wound(s)       Altaf Prevention initiated by RN: No  Wound Care Orders initiated by RN: No    Pressure Injury (Stage 3,4, Unstageable, DTI, NWPT, and Complex wounds) if present, place Wound referral order by RN under : No    New Ostomies, if present place, Ostomy referral order under : No     Nurse 1 eSignature: Electronically signed by Pedro Velasquez RN on 11/9/24 at 12:53 AM EST    **SHARE this note so that the co-signing nurse can place an eSignature**    Nurse 2 eSignature: Electronically signed by Arlen Nash RN on 11/9/24 at 2:50 AM EST

## 2024-11-09 NOTE — PROGRESS NOTES
Hospitalist Progress Note    CC: TONG (acute kidney injury) ([facility])    Name:  Tylor Cochran /Age/Sex: 1955  (69 y.o. male)   MRN & CSN:  [id number] & [id number] Encounter Date/Time: 2024 7:18 AM EST   Location:  44/0544-01 PCP: Isidoro Guaman APRN - CNP     Attending:Christy Dubois MD         Hospital course:  69 y.o. male past medical history of hyperlipidemia, hypertension, BPH, obesity, macrocytic anemia, DAQUAN, prediabetes with recent A1c of 5.4 presents from PCP office with abnormal lab findings.  About 3 months ago he started valsartan.  Found to have hyperkalemia and elevated creatinine as outpatient. Pt was also taking NSAIDs and has difficulty with urination due to prostate hypertrophy    Admit date: 2024  Days in hospital:  Hospital Day: 2  Status:  Inpatient       24 Hour Events: feels better - renal function improving without HD    Subjective: pt feels better today - he describes profound fatigue which had been getting worse over the past few weeks - he had been unable to even walk his dog -   He also endorses symptoms of prostate enlargement with inability to have full urine stream and inability to empty bladder for years    ROS:   A comprehensive review of systems was negative except for: difficulty with urination for years, frequency, severe fatigue       Objective:    /76   Pulse 64   Temp 97.4 °F (36.3 °C) (Oral)   Resp 17   Ht 1.829 m (6')   Wt 111 kg (244 lb 11.4 oz)   SpO2 99%   BMI 33.19 kg/m²     Gen: alert, cooperative  HEENT: NC/AT, moist mucous membranes, no oropharyngeal erythema or exudate  Neck: supple, trachea midline, no anterior cervical or SC LAD  Heart:  reg rate and rhythm, no murmurs, no gallops, no rubs. no leg edema  Lungs: clear to auscultation bilaterally- no wheezes, rales or rhonchi, normal air movement, no respiratory distress  Abd: soft, non-tender, non-distended, normal bowel sounds, no

## 2024-11-09 NOTE — CONSULTS
[facility] Consult Note           Reason for Consult:  Acute kidney injury  Requesting Physician:  Dr. Schuler    Chief Complaint:  Abnormal lab findings  History Obtained From:  patient, electronic medical record    History of Present Ilness:    69 year old male with HTN, BPH, Hyperlipidemia, DAQUAN, pre-DM presents with abnormal labs.  We have been asked to assist in further mgmt of his TONG.    SCr as high as 8.8 on admission, largely unchanged so far with fluids, prior 1.4 from May 2023, non-oliguric.  K as high as 6.2, improving with medical therapy.  Rey catheter inserted with ~2 liters of urine output, ~3 liters documented since admission.  UA bland, 100 glucose.  Recently started on valsartan ~3 months ago.  BP's on higher end.  +fatigue for past 1-2 months, underwent stress testing recently that was unrevealing.  States difficulty with completely emptying his bladder, no abdominal pain, shortness of breath, n/v/d, or change in weight.    Past Medical History:        Diagnosis Date    Acute deep vein thrombosis (DVT) of left lower extremity (HCC) 10/21/2016    Allergic rhinitis     BPH associated with nocturia     Cardiac septal defect     NO Surgery    Diverticulosis     DVT (deep venous thrombosis) (HCC)     History of colonic polyps     Hyperlipemia     Insomnia     Obesity     DAQUAN on CPAP     DAQUAN on CPAP 10/21/2016    Patellofemoral arthritis of left knee 08/26/2019    Plantar fasciitis     Prostatitis     Status post dilation of urethral narrowing     Type 2 diabetes mellitus without complication (HCC)     Type 2 diabetes mellitus without complication (HCC)        Past Surgical History:        Procedure Laterality Date    COLONOSCOPY  11/2005    tubular adenoma / divertics REDO 3yrs    COLONOSCOPY  11/20/2008    Hyperplastic polyp redo 5yrs    COLONOSCOPY N/A 11/20/2013    Polyps(2).Divertics Redo 3 yrs Dr. Chaves    CYST REMOVAL      URETHRAL STRICTURE

## 2024-11-09 NOTE — CONSULTS
Called Nephrology @ 2152  PER:  Jag Stinson DO  RE: admission is placed. BUN 84; Cr 8.7; K+ 6.2  Jace Novak MD responded @ 6012

## 2024-11-09 NOTE — H&P
Hospital Medicine History & Physical      Date of Admission: 11/8/2024    Date of Service:  Pt seen/examined on 11/8/2024    [x]Admitted to Inpatient with expected LOS greater than two midnights due to medical therapy.  []Placed in Observation status.    Chief Admission Complaint: Hyperkalemia, TONG    Presenting Admission History:      69 y.o. male past medical history of hyperlipidemia, hypertension, BPH, obesity, macrocytic anemia, DAQUAN, prediabetes with recent A1c of 5.4 presents from PCP office with abnormal lab findings.  About 3 months ago he started valsartan.  Found to have hyperkalemia and elevated creatinine as outpatient.    Denies fevers, chills, nausea, vomiting, chest pain, palpitations, abdominal pain, dysuria, polyuria, hematuria, diarrhea, constipation, melena, hematochezia.    ED evaluation:  Blood pressure 168/87 vital signs otherwise normal  Potassium 6.2  Creatinine 8.7  Hemoglobin 7.4  EKG shows first-degree AV block otherwise normal sinus rhythm.    Discussed case with ED physician-Dr. Warren  Assessment/Plan:      Current Principal Problem:  TONG (acute kidney injury) (HCC)    TONG  Likely secondary to ARB use  Currently making urine  Urinalysis pending  Consult nephrology  IV fluids at 100 cc/h  Monitor closely    2.  Hyperkalemia  Status post calcium gluconate, insulin, dextrose, furosemide, Lokelma in ED  Recheck BMP at 11:30 PM  Oral Lokelma ordered 3 times daily for 2 days.  If potassium comes down to normal range, will would recommend discontinuing both,    #3 hypertension  Continue to monitor  Patient will likely need to start alternate medication on discharge    4.  Macrocytic anemia  Likely combination of underlying microcytic anemia and TONG.    DPhysical Exam Performed:      BP (!) 160/85   Pulse 80   Temp 98 °F (36.7 °C)   Resp 15   Wt 110.4 kg (243 lb 6.4 oz)   SpO2 100%   BMI 33.01 kg/m²     General appearance:  No apparent distress, appears stated age and cooperative.  HEENT:

## 2024-11-09 NOTE — PLAN OF CARE
Problem: Safety - Adult  Goal: Free from fall injury  Outcome: Progressing   Pt instructed to use call light for assistance. Bed in low position, 2/4 side rails up, and call light within reach.

## 2024-11-09 NOTE — ED NOTES
Tylor Cochran is a 69 y.o. male admitted for  Principal Problem:    TONG (acute kidney injury) (HCC)  Resolved Problems:    * No resolved hospital problems. *  .   Patient Home via self with   Chief Complaint   Patient presents with    abnormal labs     Pt was seen by primary care, stated his BUN, and creatinine were elevated. Was told to come in.   .  Patient is alert and Person, Place, Time, and Situation  Patient's baseline mobility: Baseline Mobility: Independent   Code Status: No Order   Cardiac Rhythm:       Is patient on baseline Oxygen: no how many Liters:   Abnormal Assessment Findings: none    Isolation: None      NIH Score:    C-SSRS: Risk of Suicide: No Risk  Bedside swallow:        Active LDA's:   Peripheral IV 11/08/24 Right Antecubital (Active)     Patient admitted with a dennison:  If the dennison is chronic was it exchanged:  Reason for dennison:   Patient admitted with Central Line:  . PICC line placement confirmed:  Reason for Central line:   Was central line Inserted from an outside facility:        Family/Caregiver Present no Any Concerns: no   Restraints no  Sitter no         Vitals: MEWS Score: 1    Vitals:    11/08/24 1859 11/08/24 1958 11/08/24 2059 11/08/24 2157   BP: (!) 160/85 (!) 168/87 (!) 160/85 (!) 149/83   Pulse:  77 80 74   Resp:  18 15 17   Temp:       SpO2:  100% 100% 100%   Weight:           Last documented pain score (0-10 scale) Pain Level: 1  Pain medication administered No.    Pertinent or High Risk Medications/Drips: No.    Pending Blood Product Administration: no    Abnormal labs:   Abnormal Labs Reviewed   CBC WITH AUTO DIFFERENTIAL - Abnormal; Notable for the following components:       Result Value    RBC 2.15 (*)     Hemoglobin 7.4 (*)     Hematocrit 22.1 (*)     .9 (*)     MCH 34.6 (*)     RDW 12.3 (*)     All other components within normal limits   COMPREHENSIVE METABOLIC PANEL W/ REFLEX TO MG FOR LOW K - Abnormal; Notable for the following components:    Potassium  reflex Magnesium 6.2 (*)     CO2 15 (*)     Glucose 110 (*)     BUN 84 (*)     Creatinine 8.7 (*)     Est, Glom Filt Rate 6 (*)     AST 12 (*)     All other components within normal limits    Narrative:     CALL  Muller  [facility] tel. [phone],  Chemistry results called to and read back by ANAHY Hill, 11/08/2024  18:44, by MIRLANDE   URINALYSIS WITH REFLEX TO CULTURE - Abnormal; Notable for the following components:    Glucose, Ur 100 (*)     All other components within normal limits   BASIC METABOLIC PANEL W/ REFLEX TO MG FOR LOW K - Abnormal; Notable for the following components:    Sodium 135 (*)     Potassium reflex Magnesium 5.5 (*)     CO2 14 (*)     Glucose 104 (*)     BUN 84 (*)     Creatinine 8.0 (*)     Est, Glom Filt Rate 7 (*)     All other components within normal limits    Narrative:     CALL  Muller  [facility] tel. [phone],  Chemistry results called to and read back by ANAHY Hill, 11/08/2024  21:51, by MIRLANDE   POCT GLUCOSE - Abnormal; Notable for the following components:    POC Glucose 109 (*)     All other components within normal limits   POCT GLUCOSE - Abnormal; Notable for the following components:    POC Glucose 148 (*)     All other components within normal limits   POCT GLUCOSE - Abnormal; Notable for the following components:    POC Glucose 150 (*)     All other components within normal limits     Critical values: yes  Intervention for critical value(s): Attending physician informed with orders carried out    Abnormal Imaging: no xRay            You may also review the ED PT Care Timeline found under the Summary Tab, ED Encounter Summary, Timeline Reports, ED Patient Care Timeline.     Recommendation    Pending orders/Uncompleted orders to hand off:  none    Additional Comments: Patient came from his PCP with elevated creatinine and BUN advised to be seen in ED. No active complain, able to ambulate to restroom.   If any further questions, please call Sending RN at [phone]

## 2024-11-10 ENCOUNTER — APPOINTMENT (OUTPATIENT)
Dept: CT IMAGING | Age: 69
DRG: 660 | End: 2024-11-10
Payer: MEDICARE

## 2024-11-10 PROBLEM — N13.30 BILATERAL HYDRONEPHROSIS: Status: ACTIVE | Noted: 2024-11-10

## 2024-11-10 PROBLEM — E87.5 HYPERKALEMIA: Status: ACTIVE | Noted: 2024-11-10

## 2024-11-10 PROBLEM — E87.20 METABOLIC ACIDOSIS: Status: ACTIVE | Noted: 2024-11-10

## 2024-11-10 PROBLEM — D50.9 IRON DEFICIENCY ANEMIA: Status: ACTIVE | Noted: 2024-11-10

## 2024-11-10 LAB
ABO + RH BLD: NORMAL
ABO + RH BLD: NORMAL
ALBUMIN SERPL-MCNC: 3.8 G/DL (ref 3.4–5)
ANION GAP SERPL CALCULATED.3IONS-SCNC: 14 MMOL/L (ref 3–16)
BLD GP AB SCN SERPL QL: NORMAL
BUN SERPL-MCNC: 73 MG/DL (ref 7–20)
CALCIUM SERPL-MCNC: 8.3 MG/DL (ref 8.3–10.6)
CHLORIDE SERPL-SCNC: 107 MMOL/L (ref 99–110)
CO2 SERPL-SCNC: 18 MMOL/L (ref 21–32)
CREAT SERPL-MCNC: 8.1 MG/DL (ref 0.8–1.3)
DEPRECATED RDW RBC AUTO: 12.2 % (ref 12.4–15.4)
FOLATE SERPL-MCNC: 7.68 NG/ML (ref 4.78–24.2)
GFR SERPLBLD CREATININE-BSD FMLA CKD-EPI: 7 ML/MIN/{1.73_M2}
GLUCOSE BLD-MCNC: 104 MG/DL (ref 70–99)
GLUCOSE BLD-MCNC: 150 MG/DL (ref 70–99)
GLUCOSE BLD-MCNC: 264 MG/DL (ref 70–99)
GLUCOSE SERPL-MCNC: 94 MG/DL (ref 70–99)
HCT VFR BLD AUTO: 20.7 % (ref 40.5–52.5)
HCT VFR BLD AUTO: 23.1 % (ref 40.5–52.5)
HGB BLD-MCNC: 6.8 G/DL (ref 13.5–17.5)
HGB BLD-MCNC: 7.8 G/DL (ref 13.5–17.5)
IRON SATN MFR SERPL: 51 % (ref 20–50)
IRON SERPL-MCNC: 99 UG/DL (ref 59–158)
MCH RBC QN AUTO: 33.6 PG (ref 26–34)
MCHC RBC AUTO-ENTMCNC: 33.1 G/DL (ref 31–36)
MCV RBC AUTO: 101.7 FL (ref 80–100)
PERFORMED ON: ABNORMAL
PHOSPHATE SERPL-MCNC: 6.4 MG/DL (ref 2.5–4.9)
PLATELET # BLD AUTO: 186 K/UL (ref 135–450)
PMV BLD AUTO: 7.4 FL (ref 5–10.5)
POTASSIUM SERPL-SCNC: 4.7 MMOL/L (ref 3.5–5.1)
PSA SERPL DL<=0.01 NG/ML-MCNC: 0.53 NG/ML (ref 0–4)
RBC # BLD AUTO: 2.03 M/UL (ref 4.2–5.9)
SODIUM SERPL-SCNC: 139 MMOL/L (ref 136–145)
TIBC SERPL-MCNC: 193 UG/DL (ref 260–445)
WBC # BLD AUTO: 4.7 K/UL (ref 4–11)

## 2024-11-10 PROCEDURE — 86850 RBC ANTIBODY SCREEN: CPT

## 2024-11-10 PROCEDURE — 86923 COMPATIBILITY TEST ELECTRIC: CPT

## 2024-11-10 PROCEDURE — 84153 ASSAY OF PSA TOTAL: CPT

## 2024-11-10 PROCEDURE — 82728 ASSAY OF FERRITIN: CPT

## 2024-11-10 PROCEDURE — 86900 BLOOD TYPING SEROLOGIC ABO: CPT

## 2024-11-10 PROCEDURE — 36430 TRANSFUSION BLD/BLD COMPNT: CPT

## 2024-11-10 PROCEDURE — 30233N1 TRANSFUSION OF NONAUTOLOGOUS RED BLOOD CELLS INTO PERIPHERAL VEIN, PERCUTANEOUS APPROACH: ICD-10-PCS | Performed by: INTERNAL MEDICINE

## 2024-11-10 PROCEDURE — 6370000000 HC RX 637 (ALT 250 FOR IP): Performed by: NURSE PRACTITIONER

## 2024-11-10 PROCEDURE — 85027 COMPLETE CBC AUTOMATED: CPT

## 2024-11-10 PROCEDURE — 6370000000 HC RX 637 (ALT 250 FOR IP): Performed by: INTERNAL MEDICINE

## 2024-11-10 PROCEDURE — 51702 INSERT TEMP BLADDER CATH: CPT

## 2024-11-10 PROCEDURE — 83550 IRON BINDING TEST: CPT

## 2024-11-10 PROCEDURE — 36415 COLL VENOUS BLD VENIPUNCTURE: CPT

## 2024-11-10 PROCEDURE — 85014 HEMATOCRIT: CPT

## 2024-11-10 PROCEDURE — 86901 BLOOD TYPING SEROLOGIC RH(D): CPT

## 2024-11-10 PROCEDURE — P9016 RBC LEUKOCYTES REDUCED: HCPCS

## 2024-11-10 PROCEDURE — 6360000002 HC RX W HCPCS: Performed by: FAMILY MEDICINE

## 2024-11-10 PROCEDURE — 80069 RENAL FUNCTION PANEL: CPT

## 2024-11-10 PROCEDURE — 2580000003 HC RX 258: Performed by: INTERNAL MEDICINE

## 2024-11-10 PROCEDURE — 1200000000 HC SEMI PRIVATE

## 2024-11-10 PROCEDURE — 83540 ASSAY OF IRON: CPT

## 2024-11-10 PROCEDURE — 2500000003 HC RX 250 WO HCPCS: Performed by: INTERNAL MEDICINE

## 2024-11-10 PROCEDURE — 74176 CT ABD & PELVIS W/O CONTRAST: CPT

## 2024-11-10 PROCEDURE — 85018 HEMOGLOBIN: CPT

## 2024-11-10 RX ORDER — SODIUM CHLORIDE 9 MG/ML
INJECTION, SOLUTION INTRAVENOUS PRN
Status: DISCONTINUED | OUTPATIENT
Start: 2024-11-10 | End: 2024-11-19 | Stop reason: HOSPADM

## 2024-11-10 RX ADMIN — HEPARIN SODIUM 5000 UNITS: 5000 INJECTION INTRAVENOUS; SUBCUTANEOUS at 06:06

## 2024-11-10 RX ADMIN — SODIUM BICARBONATE: 84 INJECTION, SOLUTION INTRAVENOUS at 06:04

## 2024-11-10 RX ADMIN — AMLODIPINE BESYLATE 5 MG: 5 TABLET ORAL at 07:57

## 2024-11-10 RX ADMIN — Medication 10 MG: at 19:15

## 2024-11-10 RX ADMIN — CETIRIZINE HYDROCHLORIDE 10 MG: 10 TABLET, FILM COATED ORAL at 07:57

## 2024-11-10 RX ADMIN — SODIUM BICARBONATE: 84 INJECTION, SOLUTION INTRAVENOUS at 21:17

## 2024-11-10 RX ADMIN — TAMSULOSIN HYDROCHLORIDE 0.4 MG: 0.4 CAPSULE ORAL at 07:57

## 2024-11-10 NOTE — CARE COORDINATION
Case Management Assessment  Initial Evaluation    Date/Time of Evaluation: 11/10/2024 1:20 PM  Assessment Completed by: Jennifer Garcia RN    If patient is discharged prior to next notation, then this note serves as note for discharge by case management.    Patient Name: Tylor Cochran                   YOB: 1955  Diagnosis: Hyperkalemia [E87.5]  TONG (acute kidney injury) (HCC) [N17.9]  Acute renal failure, unspecified acute renal failure type (HCC) [N17.9]                   Date / Time: 11/8/2024  6:06 PM    Patient Admission Status: Inpatient   Readmission Risk (Low < 19, Mod (19-27), High > 27): Readmission Risk Score: 14.8    Current PCP: Isidoro Guaman APRN - CNP  PCP verified by CM? Yes (Isidoro MCQUEEN)    Chart Reviewed: Yes      History Provided by: Patient, Spouse, Medical Record  Patient Orientation: Alert and Oriented    Patient Cognition: Alert    Hospitalization in the last 30 days (Readmission):  No    If yes, Readmission Assessment in CM Navigator will be completed.    Advance Directives:      Code Status: Full Code   Patient's Primary Decision Maker is: Patient Declined (Legal Next of Kin Remains as Decision Maker)    Primary Decision Maker: RyanwendyKathy - Spouse - [phone]    Secondary Decision Maker: Sarah Cochran - Brother/Sister - [phone]    Discharge Planning:    Patient lives with: Spouse/Significant Other Type of Home: House  Primary Care Giver: Self  Patient Support Systems include: Spouse/Significant Other, Children   Current Financial resources: Medicare  Current community resources: None  Current services prior to admission: None            Current DME:              Type of Home Care services:  None    ADLS  Prior functional level: Independent in ADLs/IADLs  Current functional level: Independent in ADLs/IADLs    PT AM-PAC:   /24  OT AM-PAC:   /24    Family can provide assistance at DC: Yes  Would you like Case Management to discuss the discharge plan

## 2024-11-10 NOTE — PROGRESS NOTES
Pca notified RN of pt complaining of dizziness and lightheadedness when up in the chair. RN/PCA assisted pt back to bed. Vital signs obtained and perfect serve message sent to Dr. Dubois.

## 2024-11-10 NOTE — CONSULTS
Patient Name: Tylor Cochran  : 1955  Age: 69 y.o.  Sex: male    INDICATION FOR CONSULT: bilateral hydronephrosis; TONG; urinary retention     History of Present Illness: Very pleasant 70yo M admitted with TONG.  He was found to have 2 liter urinary retention upon insertion of dennison cath.  He reports several years duration difficulty voiding with frequency and nocturia.          ALLERGY:  Allergies   Allergen Reactions    Valsartan     Cipro Xr Rash    Ciprofloxacin Rash       HOME MEDICATIONS:  Medications Prior to Admission: Levomefolate Glucosamine (METHYL-FOLATE) 1700 MCG CAPS, Take by mouth  azelastine HCl 0.15 % SOLN,   Cyanocobalamin (VITAMIN B-12) 2500 MCG SUBL, Place 1 tablet under the tongue once Takes once a week.  vitamin D (CHOLECALCIFEROL) 1000 UNIT TABS tablet, Take 1 tablet by mouth daily  Multiple Vitamins-Minerals (THERAPEUTIC MULTIVITAMIN-MINERALS) tablet, Take 1 tablet by mouth daily  Loratadine-Pseudoephedrine (CLARITIN-D 12 HOUR PO), Take by mouth as needed        Past Medical History:   Past Medical History:   Diagnosis Date    Acute deep vein thrombosis (DVT) of left lower extremity (HCC) 10/21/2016    Allergic rhinitis     BPH associated with nocturia     Cardiac septal defect     NO Surgery    Diverticulosis     DVT (deep venous thrombosis) (HCC)     History of colonic polyps     Hyperlipemia     Insomnia     Obesity     DAQUAN on CPAP     DAQUAN on CPAP 10/21/2016    Patellofemoral arthritis of left knee 2019    Plantar fasciitis     Prostatitis     Status post dilation of urethral narrowing     Type 2 diabetes mellitus without complication (HCC)     Type 2 diabetes mellitus without complication (HCC)          Past Surgical History:   Past Surgical History:   Procedure Laterality Date    COLONOSCOPY  2005    tubular adenoma / divertics REDO 3yrs    COLONOSCOPY  2008    Hyperplastic polyp redo 5yrs    COLONOSCOPY N/A 2013    Polyps(2).Divertics Redo 3 yrs Dr. Chaves         11/10/2024 05:58 AM    CO2 18 11/10/2024 05:58 AM    BUN 73 11/10/2024 05:58 AM    CREATININE 8.1 11/10/2024 05:58 AM    CALCIUM 8.3 11/10/2024 05:58 AM    GFRAA >60 11/12/2021 07:49 AM    LABGLOM 7 11/10/2024 05:58 AM    LABGLOM 54 05/31/2023 07:49 AM     PT/INR:    Lab Results   Component Value Date/Time    PROTIME 11.6 10/19/2016 09:40 AM    INR 1.02 10/19/2016 09:40 AM     PTT:  No results found for: \"APTT\"[APTT    CULTURES:  URINE CULTURE  No results found for this or any previous visit.    BLOOD CULTURE  No results found for this or any previous visit.      IMAGING:  RENAL ULTRASOUND  No results found for this or any previous visit.      CT SCAN  No results found for this or any previous visit.    No results found for this or any previous visit.    No results found for this or any previous visit.    No results found for this or any previous visit.        IMPRESSION:  70yo M with high volume urinary retention and bilateral hydronephrosis on renal sono     PLAN:  1) Bilateral hydronephrosis:  -With creatinine 8  -I made patient NPO and agree with CT already ordered.  Came back after my rounds.  I have personally reviewed.  No ureteral stones.  Bilateral hydro most likely due to high volume retention.  Will trend creatinine.  If not improving substantially by tomorrow, I am making NPO after midnight for cysto, bilateral retrograde and possible stent placement.    2) Urinary retention:  -Began discussion on likely prostate source.  Will likely require procedural intervention.  Prostate 68gm by my measurements on CT.  Could be good TURP candidate.     Neha Medrano MD  11/10/2024

## 2024-11-10 NOTE — PROGRESS NOTES
Hospitalist Progress Note    CC: TONG (acute kidney injury) ([facility])    Name:  Tylor Cochran /Age/Sex: 1955  (69 y.o. male)   MRN & CSN:  [id number] & [id number] Encounter Date/Time: 11/10/2024 7:18 AM EST   Location:  0544/0544-01 PCP: Isidoro Guaman APRN - CNP     Attending:Christy Dubois MD         Hospital course:  69 y.o. male past medical history of hyperlipidemia, hypertension, BPH, obesity, macrocytic anemia, DAQUAN, prediabetes with recent A1c of 5.4 presents from PCP office with abnormal lab findings.  About 3 months ago he started valsartan.  Found to have hyperkalemia and elevated creatinine as outpatient. Pt was also taking NSAIDs and has difficulty with urination due to prostate hypertrophy.    Admit date: 2024  Days in hospital:  Hospital Day: 3  Status:  Inpatient       24 Hour Events: feels better - renal function improving without HD    Subjective: hgb dropped to 6.8 - likely due to IVF dilutional effect - will transfuse 1u pRBC - see consent note - pt agreeable  Likely valsartan and ibuprofen in setting of obstruction added to hyperkalemia - will get noncontrast CT scan to further evaluate  Pt dizzy this AM    ROS:   A comprehensive review of systems was negative except for: difficulty with urination for years, frequency, severe fatigue       Objective:    BP (!) 165/80   Pulse 79   Temp 97.4 °F (36.3 °C) (Oral)   Resp 18   Ht 1.829 m (6')   Wt 105.3 kg (232 lb 1.6 oz)   SpO2 98%   BMI 31.48 kg/m²     Gen: alert, cooperative  HEENT: NC/AT, moist mucous membranes, no oropharyngeal erythema or exudate  Neck: supple, trachea midline, no anterior cervical or SC LAD  Heart:  reg rate and rhythm, no murmurs, no gallops, no rubs. no leg edema  Lungs: clear to auscultation bilaterally- no wheezes, rales or rhonchi, normal air movement, no respiratory distress  Abd: soft, non-tender, non-distended, normal bowel sounds, no masses or

## 2024-11-10 NOTE — PROGRESS NOTES
[facility] Progress Note           Subjective/   69 y.o. year old male who we are seeing in consultation for TONG.     HPI:  Renal function slow to improve despite fluids, non-oliguric.  +lightheaded this AM with standing, receiving 1 unit prbc currently.  Intake adequate.    ROS:  No shortness of breath, abdominal pain, or back pain.    Objective/   GEN:  Chronically ill, BP (!) 146/76   Pulse 83   Temp 97.7 °F (36.5 °C) (Oral)   Resp 16   Ht 1.829 m (6')   Wt 105.3 kg (232 lb 1.6 oz)   SpO2 97%   BMI 31.48 kg/m²   HEENT: non-icteric, no JVD  CV: S1, S2 without m/r/g; no LE edema  RESP: CTA B without w/r/r; breathing wnl  ABD: +bs, soft, nt, no hsm, +umbilical hernia, reducible  SKIN: warm, no rashes    Data/  Recent Labs     11/08/24  1816 11/09/24  0548 11/10/24  0558   WBC 6.1 4.7 4.7   HGB 7.4* 7.1* 6.8*   HCT 22.1* 21.8* 20.7*   .9* 103.6* 101.7*    180 186     Recent Labs     11/09/24  0548 11/09/24  1153 11/10/24  0558    135* 139   K 5.7* 5.1 4.7    104 107   CO2 15* 15* 18*   GLUCOSE 86 107* 94   PHOS 6.0* 5.4* 6.4*   BUN 83* 79* 73*   CREATININE 8.8* 8.3* 8.1*   LABGLOM 6* 6* 7*     UA bland, 100 glucose    CK 55    Renal US -> moderate bilateral hydronephrosis, small renal cysts, right kidney 12.9 cm, left kidney 14 cm    Assessment/     - Acute kidney injury - urinary retention +  renal hypoperfusion injury in setting of ARB use, suspect obstructive issues playing primary role here   SCr as high as 8.8 on admission, largely unchanged so far with fluids, prior 1.4 from May 2023, non-oliguric     - Hyperkalemia - better with fluids, lokelma     - Anion-gap metabolic acidosis     - Hypertension     - Anemia - macrocytic, states bone marrow biopsy ~2 years ago was unrevealing, prn prbc 11/10        Plan/     - Continue IVF's with bicarb replacement  - Urology consult, appreciate assistance  - Started amlodipine for BP control, monitoring off of

## 2024-11-10 NOTE — CONSENT
Informed Consent for Blood Component Transfusion Note    I have discussed with the patient the rationale for blood component transfusion; its benefits in treating or preventing fatigue, organ damage, or death; and its risk which includes mild transfusion reactions, rare risk of blood borne infection, or more serious but rare reactions. I have discussed the alternatives to transfusion, including the risk and consequences of not receiving transfusion. The patient had an opportunity to ask questions and had agreed to proceed with transfusion of blood components.    Electronically signed by Christy Dubois MD on 11/10/24 at 8:36 AM EST

## 2024-11-10 NOTE — PLAN OF CARE
Problem: Chronic Conditions and Co-morbidities  Goal: Patient's chronic conditions and co-morbidity symptoms are monitored and maintained or improved  Outcome: Progressing     Problem: Safety - Adult  Goal: Free from fall injury  11/9/2024 1448 by Melia Casiano RN  Outcome: Progressing

## 2024-11-10 NOTE — PROGRESS NOTES
NUTRITION  Nutrition screening referral was triggered based on results obtained during nursing admission assessment for food preferences, vegetarian, and home tube feeding/TPN. Vegetarian added to diet. Discussed with RN, pt does not use home TF or TPN.     The patient's chart was reviewed and nutrition assessment is not indicated at this time.  Patient will be seen per nutrition standards of care.       Melia Islas, MS, RD, LD

## 2024-11-11 LAB
ALBUMIN SERPL-MCNC: 3.8 G/DL (ref 3.4–5)
ANION GAP SERPL CALCULATED.3IONS-SCNC: 14 MMOL/L (ref 3–16)
BUN SERPL-MCNC: 62 MG/DL (ref 7–20)
CALCIUM SERPL-MCNC: 8.1 MG/DL (ref 8.3–10.6)
CHLORIDE SERPL-SCNC: 107 MMOL/L (ref 99–110)
CO2 SERPL-SCNC: 19 MMOL/L (ref 21–32)
CREAT SERPL-MCNC: 7.4 MG/DL (ref 0.8–1.3)
DEPRECATED RDW RBC AUTO: 14.2 % (ref 12.4–15.4)
FERRITIN SERPL IA-MCNC: 425 NG/ML (ref 30–400)
GFR SERPLBLD CREATININE-BSD FMLA CKD-EPI: 7 ML/MIN/{1.73_M2}
GLUCOSE BLD-MCNC: 108 MG/DL (ref 70–99)
GLUCOSE BLD-MCNC: 111 MG/DL (ref 70–99)
GLUCOSE BLD-MCNC: 113 MG/DL (ref 70–99)
GLUCOSE BLD-MCNC: 266 MG/DL (ref 70–99)
GLUCOSE SERPL-MCNC: 95 MG/DL (ref 70–99)
HCT VFR BLD AUTO: 23.6 % (ref 40.5–52.5)
HGB BLD-MCNC: 7.9 G/DL (ref 13.5–17.5)
MCH RBC QN AUTO: 33.4 PG (ref 26–34)
MCHC RBC AUTO-ENTMCNC: 33.5 G/DL (ref 31–36)
MCV RBC AUTO: 99.7 FL (ref 80–100)
PERFORMED ON: ABNORMAL
PHOSPHATE SERPL-MCNC: 5.8 MG/DL (ref 2.5–4.9)
PLATELET # BLD AUTO: 170 K/UL (ref 135–450)
PMV BLD AUTO: 7.6 FL (ref 5–10.5)
POTASSIUM SERPL-SCNC: 4.2 MMOL/L (ref 3.5–5.1)
RBC # BLD AUTO: 2.37 M/UL (ref 4.2–5.9)
SODIUM SERPL-SCNC: 140 MMOL/L (ref 136–145)
WBC # BLD AUTO: 4 K/UL (ref 4–11)

## 2024-11-11 PROCEDURE — 2580000003 HC RX 258: Performed by: INTERNAL MEDICINE

## 2024-11-11 PROCEDURE — 36415 COLL VENOUS BLD VENIPUNCTURE: CPT

## 2024-11-11 PROCEDURE — 6370000000 HC RX 637 (ALT 250 FOR IP): Performed by: FAMILY MEDICINE

## 2024-11-11 PROCEDURE — 6370000000 HC RX 637 (ALT 250 FOR IP): Performed by: INTERNAL MEDICINE

## 2024-11-11 PROCEDURE — 85027 COMPLETE CBC AUTOMATED: CPT

## 2024-11-11 PROCEDURE — 6360000002 HC RX W HCPCS: Performed by: FAMILY MEDICINE

## 2024-11-11 PROCEDURE — 6370000000 HC RX 637 (ALT 250 FOR IP): Performed by: NURSE PRACTITIONER

## 2024-11-11 PROCEDURE — 80069 RENAL FUNCTION PANEL: CPT

## 2024-11-11 PROCEDURE — 2500000003 HC RX 250 WO HCPCS: Performed by: INTERNAL MEDICINE

## 2024-11-11 PROCEDURE — 1200000000 HC SEMI PRIVATE

## 2024-11-11 PROCEDURE — 51702 INSERT TEMP BLADDER CATH: CPT

## 2024-11-11 RX ORDER — 0.9 % SODIUM CHLORIDE 0.9 %
500 INTRAVENOUS SOLUTION INTRAVENOUS ONCE
Status: COMPLETED | OUTPATIENT
Start: 2024-11-11 | End: 2024-11-11

## 2024-11-11 RX ADMIN — ACETAMINOPHEN 650 MG: 325 TABLET ORAL at 21:55

## 2024-11-11 RX ADMIN — AMLODIPINE BESYLATE 5 MG: 5 TABLET ORAL at 07:35

## 2024-11-11 RX ADMIN — Medication 10 MG: at 21:55

## 2024-11-11 RX ADMIN — CETIRIZINE HYDROCHLORIDE 10 MG: 10 TABLET, FILM COATED ORAL at 07:35

## 2024-11-11 RX ADMIN — SODIUM CHLORIDE 500 ML: 9 INJECTION, SOLUTION INTRAVENOUS at 10:38

## 2024-11-11 RX ADMIN — HEPARIN SODIUM 5000 UNITS: 5000 INJECTION INTRAVENOUS; SUBCUTANEOUS at 13:57

## 2024-11-11 RX ADMIN — SODIUM BICARBONATE: 84 INJECTION, SOLUTION INTRAVENOUS at 12:49

## 2024-11-11 RX ADMIN — TAMSULOSIN HYDROCHLORIDE 0.4 MG: 0.4 CAPSULE ORAL at 07:35

## 2024-11-11 RX ADMIN — HEPARIN SODIUM 5000 UNITS: 5000 INJECTION INTRAVENOUS; SUBCUTANEOUS at 21:57

## 2024-11-11 RX ADMIN — SODIUM BICARBONATE: 84 INJECTION, SOLUTION INTRAVENOUS at 19:20

## 2024-11-11 ASSESSMENT — PAIN SCALES - GENERAL: PAINLEVEL_OUTOF10: 0

## 2024-11-11 NOTE — PLAN OF CARE
Problem: Chronic Conditions and Co-morbidities  Goal: Patient's chronic conditions and co-morbidity symptoms are monitored and maintained or improved  Outcome: Progressing     Problem: Discharge Planning  Goal: Discharge to home or other facility with appropriate resources  Outcome: Progressing     Problem: Safety - Adult  Goal: Free from fall injury  11/11/2024 0913 by Jerica Still, RN  Outcome: Progressing  11/10/2024 2054 by Pedro Velasquez, RN  Outcome: Progressing

## 2024-11-11 NOTE — PROGRESS NOTES
[facility] Progress Note           Subjective/   69 y.o. year old male who we are seeing in consultation for TONG.     HPI:  Renal function improving  Still c/o dizziness when he stands  S/p 1 unit prbc 11/10  Intake adequate.    ROS:  No shortness of breath, abdominal pain, or back pain.    Objective/   GEN:  Chronically ill, BP (!) 157/77   Pulse 87   Temp 97.9 °F (36.6 °C) (Oral)   Resp 16   Ht 1.829 m (6')   Wt 105.3 kg (232 lb 1.6 oz)   SpO2 96%   BMI 31.48 kg/m²   HEENT: non-icteric, no JVD  CV: S1, S2 without m/r/g; no LE edema  RESP: CTA B without w/r/r; breathing wnl  ABD: +bs, soft, nt, no hsm, +umbilical hernia, reducible  SKIN: warm, no rashes    Data/  Recent Labs     11/09/24  0548 11/10/24  0558 11/10/24  1453 11/11/24  0529   WBC 4.7 4.7  --  4.0   HGB 7.1* 6.8* 7.8* 7.9*   HCT 21.8* 20.7* 23.1* 23.6*   .6* 101.7*  --  99.7    186  --  170     Recent Labs     11/09/24  1153 11/10/24  0558 11/11/24  0529   * 139 140   K 5.1 4.7 4.2    107 107   CO2 15* 18* 19*   GLUCOSE 107* 94 95   PHOS 5.4* 6.4* 5.8*   BUN 79* 73* 62*   CREATININE 8.3* 8.1* 7.4*   LABGLOM 6* 7* 7*     UA bland, 100 glucose    CK 55    Renal US -> moderate bilateral hydronephrosis, small renal cysts, right kidney 12.9 cm, left kidney 14 cm    CT scan a/p wo contrast from 11/10/24  IMPRESSION:  Bladder wall thickening with internal soft tissue nodularity or dense  material (i.e.  Blood products).  Associated moderate right and severe  left-sided hydroureteronephrosis, which appears reflux related.  Complete  bladder evaluation is limited due to internal Rey catheter and lack of  contrast.  Urological follow-up is recommended for possible cystoscopy to  exclude bladder malignancy.    Lab Results   Component Value Date    IRON 99 11/10/2024    TIBC 193 (L) 11/10/2024   T sats 51%    Assessment/     - Acute kidney injury - urinary retention +  renal hypoperfusion injury in  setting of ARB use, suspect obstructive issues playing primary role here   SCr as high as 8.8 on admission, prior 1.4 from May 2023, non-oliguric   Urology following and planning cysto w possible TURP, date yet to be fixed   PSA 0.53     - Hyperkalemia - better with fluids, lokelma     - Anion-gap metabolic acidosis     - Hypertension     - Anemia - macrocytic, states bone marrow biopsy ~2 years ago was unrevealing, prn prbc 11/10        Plan/   - NS bolus 500 ml x1 over 2h  - IVF's with bicarb replacement, increase to 125 ml/h  -cont amlodipine for BP control, monitoring off of valsartan from admission  - Monitor off of lokelma  - Trend labs, bp's, weights, & urine output, f/u myeloma screen    ____________________________________  Mirian Gimenez MD  [facility]  [email or web address]  Office: [phone]

## 2024-11-11 NOTE — PROGRESS NOTES
Pt Name: Tylor Cochran  Medical Record Number: [id number]  Date of Birth 1955   Today's Date: 11/11/2024      Subjective:  Awake in bed, no complaints    ROS: Constitutional: No fever    Vitals:  Vitals:    11/10/24 1316 11/10/24 1549 11/10/24 1911 11/11/24 0734   BP: (!) 164/84 (!) 145/89 (!) 165/76 (!) 157/77   Pulse: 79 81 88 87   Resp: 18 18 16 16   Temp: 98 °F (36.7 °C) 97.9 °F (36.6 °C) 98 °F (36.7 °C) 97.9 °F (36.6 °C)   TempSrc: Oral Oral Oral Oral   SpO2: 99% 97% 98% 96%   Weight:       Height:         I/O last 3 completed shifts:  In: 3079 [P.O.:480; I.V.:2299; Blood:300]  Out: 8200 [Urine:8200]    Exam:  General: Awake, oriented, no acute distress  Respiratory: Nonlabored breathing  Abdomen: Soft, non-tender, non-distended, no masses  : dennison catheter intact with yellow output  Skin: Skin color, texture, turgor normal, no rashes or lesions  Neurologic: no gross deficits    CURRENT MEDICATIONS   Scheduled Meds:   sodium chloride  500 mL IntraVENous Once    amLODIPine  5 mg Oral Daily    tamsulosin  0.4 mg Oral Daily    cetirizine  10 mg Oral Daily    melatonin  10 mg Oral Nightly    sodium chloride flush  5-40 mL IntraVENous 2 times per day    heparin (porcine)  5,000 Units SubCUTAneous 3 times per day     Continuous Infusions:   sodium bicarbonate 75 mEq in sodium chloride 0.45 % 1,000 mL infusion      sodium chloride      dextrose      sodium chloride       PRN Meds:.sodium chloride, cloNIDine, sodium chloride, glucose, dextrose bolus **OR** dextrose bolus, glucagon (rDNA), dextrose, sodium chloride flush, sodium chloride, ondansetron **OR** ondansetron, polyethylene glycol, acetaminophen **OR** acetaminophen    LABS     Recent Labs     11/08/24  1816 11/08/24  2119 11/09/24  0548 11/09/24  1153 11/10/24  0558 11/10/24  1453 11/11/24  0529   WBC 6.1  --  4.7  --  4.7  --  4.0   HGB 7.4*  --  7.1*  --  6.8* 7.8* 7.9*   HCT 22.1*  --  21.8*  --  20.7* 23.1* 23.6*     --  180  --  186  --   170      < > 137 135* 139  --  140   K 6.2*   < > 5.7* 5.1 4.7  --  4.2      < > 109 104 107  --  107   CO2 15*   < > 15* 15* 18*  --  19*   BUN 84*   < > 83* 79* 73*  --  62*   CREATININE 8.7*   < > 8.8* 8.3* 8.1*  --  7.4*   PHOS  --    < > 6.0* 5.4* 6.4*  --  5.8*   CALCIUM 8.7   < > 8.3 8.3 8.3  --  8.1*   AST 12*  --   --   --   --   --   --    ALT 10  --   --   --   --   --   --    BILITOT 0.3  --   --   --   --   --   --     < > = values in this interval not displayed.       ASSESSMENT   Hospital day # 3  69y.o. male admitted with TONG and urinary retention with bilateral hydronephrosis. Rey catheter placed for 2L output.   PLAN   TONG is slowly improving to 7.4 today. Will continue to monitor creatinine level and place NPO again tonight for possible cysto, bilateral retrograde pyelogram and possible stent placement if doesn't continue to improve. Will also attempt to get patient scheduled for a TURP procedure on Wednesday with Dr. Medrano. Patient advised of TURP side effects including risk of ED, retrograde ejaculation, bleeding, BPH symptoms for several weeks to months post procedure. HE agrees to proceed.      Dora Castaneda, APRN - CNP 11/11/2024

## 2024-11-11 NOTE — PROGRESS NOTES
Hospitalist Progress Note    CC: TONG (acute kidney injury) ([facility])    Name:  Tylor Cochran /Age/Sex: 1955  (69 y.o. male)   MRN & CSN:  [id number] & [id number] Encounter Date/Time: 2024 7:18 AM EST   Location:  [facility]/0544-01 PCP: Isidoro Guaman APRN - CNP     Attending:Kyara Menard MD         Hospital course:  69 y.o. male past medical history of hyperlipidemia, hypertension, BPH, obesity, macrocytic anemia, DAQUAN, prediabetes with recent A1c of 5.4 presents from PCP office with abnormal lab findings.  About 3 months ago he started valsartan.  Found to have hyperkalemia and elevated creatinine as outpatient. Pt was also taking NSAIDs and has difficulty with urination due to prostate hypertrophy.  He has bilateral renal hydronephrosis.  CT scan ordered.  Transfused 1u pRBC.  Urology and nephrology consulted.    Admit date: 2024  Days in hospital:  Hospital Day: 4  Status:  Inpatient            Subjective: hs/p   1u pRBC -Pt dizzy this AM  Urine blood tinge , has dennison   No CP. Syncope or change in MS  Denies fever , ab pain , N or V      ROS:   A comprehensive review of systems was negative except for: difficulty with urination for years, frequency, severe fatigue       Objective:    BP (!) 157/77   Pulse 87   Temp 97.9 °F (36.6 °C) (Oral)   Resp 16   Ht 1.829 m (6')   Wt 105.3 kg (232 lb 1.6 oz)   SpO2 96%   BMI 31.48 kg/m²     Gen: alert, cooperative , obese   HEENT: NC/AT, moist mucous membranes, no oropharyngeal erythema or exudate  Neck: supple, trachea midline, no anterior cervical or SC LAD  Heart:  reg rate and rhythm, no murmurs, no gallops, no rubs. no leg edema  Lungs: clear to auscultation bilaterally- no wheezes, rales or rhonchi, normal air movement, no respiratory distress  Abd: soft, lower ab -tender, non-distended, normal bowel sounds, no masses or organomegaly  Extrem:  no clubbing, cyanosis, or edema, no ulcers, no

## 2024-11-11 NOTE — CARE COORDINATION
Chart reviewed day 3. Pt is followed by IM, Nephro and Uro. TONG slowly improving. NPO tonight for possible cysto w/possible stent placement. Then possible TURP procedure on Wednesday. Pt IPTA w/family. Will follow for needs as they arise. Electronically signed by JOANN DE LEON RN on 11/11/2024 at 1:49 PM

## 2024-11-12 ENCOUNTER — APPOINTMENT (OUTPATIENT)
Dept: GENERAL RADIOLOGY | Age: 69
DRG: 660 | End: 2024-11-12
Payer: MEDICARE

## 2024-11-12 ENCOUNTER — ANESTHESIA (OUTPATIENT)
Dept: OPERATING ROOM | Age: 69
End: 2024-11-12
Payer: MEDICARE

## 2024-11-12 ENCOUNTER — ANESTHESIA EVENT (OUTPATIENT)
Dept: OPERATING ROOM | Age: 69
End: 2024-11-12
Payer: MEDICARE

## 2024-11-12 LAB
ABO + RH BLD: NORMAL
ALBUMIN SERPL-MCNC: 3.7 G/DL (ref 3.4–5)
ANION GAP SERPL CALCULATED.3IONS-SCNC: 15 MMOL/L (ref 3–16)
BILIRUB UR QL STRIP.AUTO: NEGATIVE
BLD GP AB SCN SERPL QL: NORMAL
BUN SERPL-MCNC: 58 MG/DL (ref 7–20)
CALCIUM SERPL-MCNC: 7.7 MG/DL (ref 8.3–10.6)
CHLORIDE SERPL-SCNC: 102 MMOL/L (ref 99–110)
CLARITY UR: ABNORMAL
CO2 SERPL-SCNC: 19 MMOL/L (ref 21–32)
COLOR UR: ABNORMAL
CREAT SERPL-MCNC: 6.9 MG/DL (ref 0.8–1.3)
DEPRECATED RDW RBC AUTO: 13.8 % (ref 12.4–15.4)
GFR SERPLBLD CREATININE-BSD FMLA CKD-EPI: 8 ML/MIN/{1.73_M2}
GLUCOSE BLD-MCNC: 128 MG/DL (ref 70–99)
GLUCOSE BLD-MCNC: 139 MG/DL (ref 70–99)
GLUCOSE BLD-MCNC: 141 MG/DL (ref 70–99)
GLUCOSE BLD-MCNC: 143 MG/DL (ref 70–99)
GLUCOSE BLD-MCNC: 148 MG/DL (ref 70–99)
GLUCOSE SERPL-MCNC: 136 MG/DL (ref 70–99)
GLUCOSE UR STRIP.AUTO-MCNC: 250 MG/DL
HCT VFR BLD AUTO: 22.6 % (ref 40.5–52.5)
HGB BLD-MCNC: 7.7 G/DL (ref 13.5–17.5)
HGB UR QL STRIP.AUTO: ABNORMAL
KAPPA LC FREE SER-MCNC: 156 MG/L (ref 2.37–20.73)
KAPPA LC FREE/LAMBDA FREE SER: 2.41 {RATIO} (ref 0.22–1.74)
KETONES UR STRIP.AUTO-MCNC: NEGATIVE MG/DL
LAMBDA LC FREE SERPL-MCNC: 64.8 MG/L (ref 4.23–27.69)
LEUKOCYTE ESTERASE UR QL STRIP.AUTO: ABNORMAL
MCH RBC QN AUTO: 33.6 PG (ref 26–34)
MCHC RBC AUTO-ENTMCNC: 34.1 G/DL (ref 31–36)
MCV RBC AUTO: 98.5 FL (ref 80–100)
NITRITE UR QL STRIP.AUTO: NEGATIVE
PERFORMED ON: ABNORMAL
PH UR STRIP.AUTO: 8 [PH] (ref 5–8)
PHOSPHATE SERPL-MCNC: 3.3 MG/DL (ref 2.5–4.9)
PLATELET # BLD AUTO: 126 K/UL (ref 135–450)
PMV BLD AUTO: 7.4 FL (ref 5–10.5)
POTASSIUM SERPL-SCNC: 3.9 MMOL/L (ref 3.5–5.1)
PROT UR STRIP.AUTO-MCNC: 30 MG/DL
RBC # BLD AUTO: 2.29 M/UL (ref 4.2–5.9)
RBC #/AREA URNS HPF: >100 /HPF (ref 0–4)
RPT COMMENT: ABNORMAL
SODIUM SERPL-SCNC: 136 MMOL/L (ref 136–145)
SP GR UR STRIP.AUTO: 1.01 (ref 1–1.03)
UA COMPLETE W REFLEX CULTURE PNL UR: ABNORMAL
UA DIPSTICK W REFLEX MICRO PNL UR: YES
URN SPEC COLLECT METH UR: ABNORMAL
UROBILINOGEN UR STRIP-ACNC: 0.2 E.U./DL
WBC # BLD AUTO: 7.7 K/UL (ref 4–11)
WBC #/AREA URNS HPF: ABNORMAL /HPF (ref 0–5)

## 2024-11-12 PROCEDURE — 3700000001 HC ADD 15 MINUTES (ANESTHESIA): Performed by: UROLOGY

## 2024-11-12 PROCEDURE — 2580000003 HC RX 258: Performed by: INTERNAL MEDICINE

## 2024-11-12 PROCEDURE — 6360000002 HC RX W HCPCS: Performed by: INTERNAL MEDICINE

## 2024-11-12 PROCEDURE — 0T768DZ DILATION OF RIGHT URETER WITH INTRALUMINAL DEVICE, VIA NATURAL OR ARTIFICIAL OPENING ENDOSCOPIC: ICD-10-PCS | Performed by: UROLOGY

## 2024-11-12 PROCEDURE — 6370000000 HC RX 637 (ALT 250 FOR IP): Performed by: INTERNAL MEDICINE

## 2024-11-12 PROCEDURE — 3700000000 HC ANESTHESIA ATTENDED CARE: Performed by: UROLOGY

## 2024-11-12 PROCEDURE — 86850 RBC ANTIBODY SCREEN: CPT

## 2024-11-12 PROCEDURE — 2720000010 HC SURG SUPPLY STERILE: Performed by: UROLOGY

## 2024-11-12 PROCEDURE — 86901 BLOOD TYPING SEROLOGIC RH(D): CPT

## 2024-11-12 PROCEDURE — 7100000000 HC PACU RECOVERY - FIRST 15 MIN: Performed by: UROLOGY

## 2024-11-12 PROCEDURE — 6360000002 HC RX W HCPCS: Performed by: UROLOGY

## 2024-11-12 PROCEDURE — P9016 RBC LEUKOCYTES REDUCED: HCPCS

## 2024-11-12 PROCEDURE — 88305 TISSUE EXAM BY PATHOLOGIST: CPT

## 2024-11-12 PROCEDURE — 2500000003 HC RX 250 WO HCPCS: Performed by: INTERNAL MEDICINE

## 2024-11-12 PROCEDURE — 51702 INSERT TEMP BLADDER CATH: CPT

## 2024-11-12 PROCEDURE — 2500000003 HC RX 250 WO HCPCS: Performed by: UROLOGY

## 2024-11-12 PROCEDURE — 2709999900 HC NON-CHARGEABLE SUPPLY: Performed by: UROLOGY

## 2024-11-12 PROCEDURE — 81001 URINALYSIS AUTO W/SCOPE: CPT

## 2024-11-12 PROCEDURE — 1200000000 HC SEMI PRIVATE

## 2024-11-12 PROCEDURE — C1769 GUIDE WIRE: HCPCS | Performed by: UROLOGY

## 2024-11-12 PROCEDURE — 80069 RENAL FUNCTION PANEL: CPT

## 2024-11-12 PROCEDURE — 36415 COLL VENOUS BLD VENIPUNCTURE: CPT

## 2024-11-12 PROCEDURE — 85027 COMPLETE CBC AUTOMATED: CPT

## 2024-11-12 PROCEDURE — 86900 BLOOD TYPING SEROLOGIC ABO: CPT

## 2024-11-12 PROCEDURE — 7100000001 HC PACU RECOVERY - ADDTL 15 MIN: Performed by: UROLOGY

## 2024-11-12 PROCEDURE — 2500000003 HC RX 250 WO HCPCS: Performed by: NURSE ANESTHETIST, CERTIFIED REGISTERED

## 2024-11-12 PROCEDURE — BT1D1ZZ FLUOROSCOPY OF RIGHT KIDNEY, URETER AND BLADDER USING LOW OSMOLAR CONTRAST: ICD-10-PCS | Performed by: UROLOGY

## 2024-11-12 PROCEDURE — 74018 RADEX ABDOMEN 1 VIEW: CPT

## 2024-11-12 PROCEDURE — 86923 COMPATIBILITY TEST ELECTRIC: CPT

## 2024-11-12 PROCEDURE — C2617 STENT, NON-COR, TEM W/O DEL: HCPCS | Performed by: UROLOGY

## 2024-11-12 PROCEDURE — 6360000004 HC RX CONTRAST MEDICATION: Performed by: UROLOGY

## 2024-11-12 PROCEDURE — 6360000002 HC RX W HCPCS: Performed by: NURSE ANESTHETIST, CERTIFIED REGISTERED

## 2024-11-12 PROCEDURE — 3600000004 HC SURGERY LEVEL 4 BASE: Performed by: UROLOGY

## 2024-11-12 PROCEDURE — 2580000003 HC RX 258: Performed by: NURSE ANESTHETIST, CERTIFIED REGISTERED

## 2024-11-12 PROCEDURE — 3600000014 HC SURGERY LEVEL 4 ADDTL 15MIN: Performed by: UROLOGY

## 2024-11-12 PROCEDURE — 6370000000 HC RX 637 (ALT 250 FOR IP): Performed by: NURSE PRACTITIONER

## 2024-11-12 PROCEDURE — 2580000003 HC RX 258: Performed by: UROLOGY

## 2024-11-12 PROCEDURE — 0VB08ZZ EXCISION OF PROSTATE, VIA NATURAL OR ARTIFICIAL OPENING ENDOSCOPIC: ICD-10-PCS | Performed by: UROLOGY

## 2024-11-12 PROCEDURE — 6360000002 HC RX W HCPCS: Performed by: FAMILY MEDICINE

## 2024-11-12 DEVICE — URETERAL STENT
Type: IMPLANTABLE DEVICE | Site: URETER | Status: FUNCTIONAL
Brand: CONTOUR™

## 2024-11-12 RX ORDER — FENTANYL CITRATE 50 UG/ML
INJECTION, SOLUTION INTRAMUSCULAR; INTRAVENOUS
Status: DISCONTINUED | OUTPATIENT
Start: 2024-11-12 | End: 2024-11-12 | Stop reason: SDUPTHER

## 2024-11-12 RX ORDER — ONDANSETRON 2 MG/ML
4 INJECTION INTRAMUSCULAR; INTRAVENOUS EVERY 30 MIN PRN
Status: DISCONTINUED | OUTPATIENT
Start: 2024-11-12 | End: 2024-11-12 | Stop reason: HOSPADM

## 2024-11-12 RX ORDER — OXYCODONE HYDROCHLORIDE 5 MG/1
10 TABLET ORAL PRN
Status: DISCONTINUED | OUTPATIENT
Start: 2024-11-12 | End: 2024-11-12 | Stop reason: HOSPADM

## 2024-11-12 RX ORDER — SODIUM CHLORIDE 9 MG/ML
INJECTION, SOLUTION INTRAVENOUS PRN
Status: DISCONTINUED | OUTPATIENT
Start: 2024-11-12 | End: 2024-11-12 | Stop reason: HOSPADM

## 2024-11-12 RX ORDER — SODIUM CHLORIDE 9 MG/ML
INJECTION, SOLUTION INTRAVENOUS PRN
Status: DISCONTINUED | OUTPATIENT
Start: 2024-11-12 | End: 2024-11-19 | Stop reason: HOSPADM

## 2024-11-12 RX ORDER — OXYCODONE HYDROCHLORIDE 5 MG/1
5 TABLET ORAL PRN
Status: DISCONTINUED | OUTPATIENT
Start: 2024-11-12 | End: 2024-11-12 | Stop reason: HOSPADM

## 2024-11-12 RX ORDER — SODIUM CHLORIDE 9 MG/ML
INJECTION, SOLUTION INTRAVENOUS
Status: DISCONTINUED | OUTPATIENT
Start: 2024-11-12 | End: 2024-11-12 | Stop reason: SDUPTHER

## 2024-11-12 RX ORDER — IOPAMIDOL 612 MG/ML
INJECTION, SOLUTION INTRAVASCULAR PRN
Status: DISCONTINUED | OUTPATIENT
Start: 2024-11-12 | End: 2024-11-12 | Stop reason: HOSPADM

## 2024-11-12 RX ORDER — SODIUM CHLORIDE 0.9 % (FLUSH) 0.9 %
5-40 SYRINGE (ML) INJECTION EVERY 12 HOURS SCHEDULED
Status: DISCONTINUED | OUTPATIENT
Start: 2024-11-12 | End: 2024-11-12 | Stop reason: HOSPADM

## 2024-11-12 RX ORDER — ROCURONIUM BROMIDE 10 MG/ML
INJECTION, SOLUTION INTRAVENOUS
Status: DISCONTINUED | OUTPATIENT
Start: 2024-11-12 | End: 2024-11-12 | Stop reason: SDUPTHER

## 2024-11-12 RX ORDER — LABETALOL HYDROCHLORIDE 5 MG/ML
10 INJECTION, SOLUTION INTRAVENOUS
Status: DISCONTINUED | OUTPATIENT
Start: 2024-11-12 | End: 2024-11-12 | Stop reason: HOSPADM

## 2024-11-12 RX ORDER — ONDANSETRON 2 MG/ML
INJECTION INTRAMUSCULAR; INTRAVENOUS
Status: DISCONTINUED | OUTPATIENT
Start: 2024-11-12 | End: 2024-11-12 | Stop reason: SDUPTHER

## 2024-11-12 RX ORDER — CALCIUM GLUCONATE 20 MG/ML
2000 INJECTION, SOLUTION INTRAVENOUS ONCE
Status: COMPLETED | OUTPATIENT
Start: 2024-11-12 | End: 2024-11-12

## 2024-11-12 RX ORDER — PROPOFOL 10 MG/ML
INJECTION, EMULSION INTRAVENOUS
Status: DISCONTINUED | OUTPATIENT
Start: 2024-11-12 | End: 2024-11-12 | Stop reason: SDUPTHER

## 2024-11-12 RX ORDER — DEXAMETHASONE SODIUM PHOSPHATE 4 MG/ML
INJECTION, SOLUTION INTRA-ARTICULAR; INTRALESIONAL; INTRAMUSCULAR; INTRAVENOUS; SOFT TISSUE
Status: DISCONTINUED | OUTPATIENT
Start: 2024-11-12 | End: 2024-11-12 | Stop reason: SDUPTHER

## 2024-11-12 RX ORDER — SODIUM CHLORIDE 0.9 % (FLUSH) 0.9 %
5-40 SYRINGE (ML) INJECTION PRN
Status: DISCONTINUED | OUTPATIENT
Start: 2024-11-12 | End: 2024-11-12 | Stop reason: HOSPADM

## 2024-11-12 RX ORDER — PHENYLEPHRINE HCL IN 0.9% NACL 1 MG/10 ML
SYRINGE (ML) INTRAVENOUS
Status: DISCONTINUED | OUTPATIENT
Start: 2024-11-12 | End: 2024-11-12 | Stop reason: SDUPTHER

## 2024-11-12 RX ORDER — LIDOCAINE HYDROCHLORIDE 20 MG/ML
INJECTION, SOLUTION INFILTRATION; PERINEURAL
Status: DISCONTINUED | OUTPATIENT
Start: 2024-11-12 | End: 2024-11-12 | Stop reason: SDUPTHER

## 2024-11-12 RX ORDER — NALOXONE HYDROCHLORIDE 0.4 MG/ML
INJECTION, SOLUTION INTRAMUSCULAR; INTRAVENOUS; SUBCUTANEOUS PRN
Status: DISCONTINUED | OUTPATIENT
Start: 2024-11-12 | End: 2024-11-12 | Stop reason: HOSPADM

## 2024-11-12 RX ORDER — CALCIUM CHLORIDE 100 MG/ML
INJECTION INTRAVENOUS; INTRAVENTRICULAR
Status: DISCONTINUED | OUTPATIENT
Start: 2024-11-12 | End: 2024-11-12 | Stop reason: SDUPTHER

## 2024-11-12 RX ADMIN — PROPOFOL 120 MG: 10 INJECTION, EMULSION INTRAVENOUS at 16:54

## 2024-11-12 RX ADMIN — ROCURONIUM BROMIDE 10 MG: 50 INJECTION, SOLUTION INTRAVENOUS at 17:35

## 2024-11-12 RX ADMIN — FENTANYL CITRATE 50 MCG: 50 INJECTION, SOLUTION INTRAMUSCULAR; INTRAVENOUS at 16:53

## 2024-11-12 RX ADMIN — Medication 200 MCG: at 17:16

## 2024-11-12 RX ADMIN — CEFTRIAXONE SODIUM 1000 MG: 1 INJECTION, POWDER, FOR SOLUTION INTRAMUSCULAR; INTRAVENOUS at 12:23

## 2024-11-12 RX ADMIN — SODIUM CHLORIDE: 9 INJECTION, SOLUTION INTRAVENOUS at 17:10

## 2024-11-12 RX ADMIN — Medication 300 MCG: at 17:19

## 2024-11-12 RX ADMIN — HEPARIN SODIUM 5000 UNITS: 5000 INJECTION INTRAVENOUS; SUBCUTANEOUS at 06:10

## 2024-11-12 RX ADMIN — CETIRIZINE HYDROCHLORIDE 10 MG: 10 TABLET, FILM COATED ORAL at 08:39

## 2024-11-12 RX ADMIN — Medication 100 MCG: at 17:13

## 2024-11-12 RX ADMIN — Medication 100 MCG: at 17:08

## 2024-11-12 RX ADMIN — CALCIUM GLUCONATE 2000 MG: 20 INJECTION, SOLUTION INTRAVENOUS at 08:42

## 2024-11-12 RX ADMIN — ROCURONIUM BROMIDE 10 MG: 50 INJECTION, SOLUTION INTRAVENOUS at 17:21

## 2024-11-12 RX ADMIN — HEPARIN SODIUM 5000 UNITS: 5000 INJECTION INTRAVENOUS; SUBCUTANEOUS at 22:50

## 2024-11-12 RX ADMIN — ROCURONIUM BROMIDE 10 MG: 50 INJECTION, SOLUTION INTRAVENOUS at 17:55

## 2024-11-12 RX ADMIN — DEXAMETHASONE SODIUM PHOSPHATE 8 MG: 4 INJECTION, SOLUTION INTRAMUSCULAR; INTRAVENOUS at 17:02

## 2024-11-12 RX ADMIN — ROCURONIUM BROMIDE 50 MG: 50 INJECTION, SOLUTION INTRAVENOUS at 16:54

## 2024-11-12 RX ADMIN — LIDOCAINE HYDROCHLORIDE 100 MG: 20 INJECTION, SOLUTION INFILTRATION; PERINEURAL at 16:53

## 2024-11-12 RX ADMIN — Medication 100 MCG: at 17:01

## 2024-11-12 RX ADMIN — TAMSULOSIN HYDROCHLORIDE 0.4 MG: 0.4 CAPSULE ORAL at 08:39

## 2024-11-12 RX ADMIN — METHYLENE BLUE 10 ML: 5 INJECTION INTRAVENOUS at 17:26

## 2024-11-12 RX ADMIN — Medication 100 MCG: at 17:11

## 2024-11-12 RX ADMIN — PHENYLEPHRINE HYDROCHLORIDE 100 MCG/MIN: 10 INJECTION INTRAVENOUS at 17:23

## 2024-11-12 RX ADMIN — Medication 100 MCG: at 17:04

## 2024-11-12 RX ADMIN — SODIUM BICARBONATE: 84 INJECTION, SOLUTION INTRAVENOUS at 03:20

## 2024-11-12 RX ADMIN — CALCIUM CHLORIDE 1 G: 100 INJECTION, SOLUTION INTRAVENOUS at 17:09

## 2024-11-12 RX ADMIN — SODIUM BICARBONATE: 84 INJECTION, SOLUTION INTRAVENOUS at 20:47

## 2024-11-12 RX ADMIN — ONDANSETRON 4 MG: 2 INJECTION INTRAMUSCULAR; INTRAVENOUS at 17:02

## 2024-11-12 ASSESSMENT — PAIN - FUNCTIONAL ASSESSMENT: PAIN_FUNCTIONAL_ASSESSMENT: 0-10

## 2024-11-12 ASSESSMENT — PAIN SCALES - GENERAL: PAINLEVEL_OUTOF10: 0

## 2024-11-12 NOTE — CARE COORDINATION
Writer reviewed chart and spoke with MD, and RN plan is TURP today vs tomorrow. Urology to weigh in.     Yoly Vallejo RN

## 2024-11-12 NOTE — PROGRESS NOTES
Pt Name: Tylor Cochran  Medical Record Number: [id number]  Date of Birth 1955   Today's Date: 11/12/2024      Subjective:  Awake in bed, no complaints. Urine yellow in dennison    ROS: Constitutional: No fever    Vitals:  Vitals:    11/11/24 2350 11/12/24 0838 11/12/24 1026 11/12/24 1450   BP:  105/60 119/68 120/67   Pulse:  89  86   Resp:  18  18   Temp: 99.5 °F (37.5 °C) 99 °F (37.2 °C)  99.4 °F (37.4 °C)   TempSrc:  Oral  Oral   SpO2:  97%  96%   Weight:       Height:         I/O last 3 completed shifts:  In: -   Out: 6550 [Urine:6550]    Exam:  General: Awake, oriented, no acute distress  Respiratory: Nonlabored breathing  Abdomen: Soft, non-tender, non-distended, no masses  : dennison catheter intact with yellow output  Skin: Skin color, texture, turgor normal, no rashes or lesions  Neurologic: no gross deficits    CURRENT MEDICATIONS   Scheduled Meds:   cefTRIAXone (ROCEPHIN) IV  1,000 mg IntraVENous Q24H    amLODIPine  5 mg Oral Daily    tamsulosin  0.4 mg Oral Daily    [Held by provider] cetirizine  10 mg Oral Daily    [Held by provider] melatonin  10 mg Oral Nightly    sodium chloride flush  5-40 mL IntraVENous 2 times per day    heparin (porcine)  5,000 Units SubCUTAneous 3 times per day     Continuous Infusions:   sodium chloride      sodium bicarbonate 75 mEq in sodium chloride 0.45 % 1,000 mL infusion 125 mL/hr at 11/12/24 0320    sodium chloride      dextrose      sodium chloride       PRN Meds:.sodium chloride, sodium chloride, cloNIDine, sodium chloride, glucose, dextrose bolus **OR** dextrose bolus, glucagon (rDNA), dextrose, sodium chloride flush, sodium chloride, ondansetron **OR** ondansetron, polyethylene glycol, acetaminophen **OR** acetaminophen    LABS     Recent Labs     11/10/24  0558 11/10/24  1453 11/11/24  0529 11/12/24  0511   WBC 4.7  --  4.0 7.7   HGB 6.8* 7.8* 7.9* 7.7*   HCT 20.7* 23.1* 23.6* 22.6*     --  170 126*     --  140 136   K 4.7  --  4.2 3.9     --

## 2024-11-12 NOTE — PLAN OF CARE
Problem: Chronic Conditions and Co-morbidities  Goal: Patient's chronic conditions and co-morbidity symptoms are monitored and maintained or improved  11/12/2024 0853 by Jerica Still RN  Outcome: Progressing  11/12/2024 0019 by Warren Moreno RN  Outcome: Progressing     Problem: Discharge Planning  Goal: Discharge to home or other facility with appropriate resources  11/12/2024 0853 by Jerica Still, RN  Outcome: Progressing  11/12/2024 0019 by Warren Moreno RN  Outcome: Progressing     Problem: Safety - Adult  Goal: Free from fall injury  11/12/2024 0853 by Jerica Still RN  Outcome: Progressing  11/12/2024 0019 by Warren Moreno RN  Outcome: Progressing

## 2024-11-12 NOTE — PROGRESS NOTES
Hospitalist Progress Note    CC: TONG (acute kidney injury) ([facility])    Name:  Tylor Cochran /Age/Sex: 1955  (69 y.o. male)   MRN & CSN:  [id number] & [id number] Encounter Date/Time: 2024 7:18 AM EST   Location:  [facility]/0544-01 PCP: Isidoro Guaman APRN - CNP     Attending:Kyara Menard MD         Hospital course:  69 y.o. male past medical history of hyperlipidemia, hypertension, BPH, obesity, macrocytic anemia, DAQUAN, prediabetes with recent A1c of 5.4 presents from PCP office with abnormal lab findings.  About 3 months ago he started valsartan.  Found to have hyperkalemia and elevated creatinine as outpatient. Pt was also taking NSAIDs and has difficulty with urination due to prostate hypertrophy.  He has bilateral renal hydronephrosis.  CT scan ordered.  Transfused 1u pRBC.  Urology and nephrology consulted.    Admit date: 2024  Days in hospital:  Hospital Day: 5  Status:  Inpatient            Subjective: s/p   1u pRBC - this admission     , has dennison   No CP. Syncope or change in MS   Had fever and chills over night   No  , ab pain , N or V  Currently NPO      ROS:   A comprehensive review of systems was negative except for: difficulty with urination for years, frequency, severe fatigue       Objective:    /60   Pulse 89   Temp 99 °F (37.2 °C) (Oral)   Resp 18   Ht 1.829 m (6')   Wt 105.3 kg (232 lb 1.6 oz)   SpO2 97%   BMI 31.48 kg/m²     Gen: alert, cooperative , obese   HEENT: NC/AT, moist mucous membranes, no oropharyngeal erythema or exudate  Neck: supple, trachea midline, no anterior cervical or SC LAD  Heart:  reg rate and rhythm, no murmurs, no gallops, no rubs. no leg edema  Lungs: clear to auscultation bilaterally- no wheezes, rales or rhonchi, normal air movement, no respiratory distress  Abd: soft, lower ab -tender, non-distended, normal bowel sounds, no masses or organomegaly  Extrem:  no clubbing, cyanosis, or edema, no  ulcers, no Lizzie's sign, and normal pulses, equal and bilateral 2+  Skin: Warm  Psych: A & O x3  Neuro: grossly intact, moves all 4 extremities            Assessment:    Principal Problem:    TONG (acute kidney injury) (HCC)  Active Problems:    BPH associated with nocturia    Metabolic acidosis    Hyperkalemia    Iron deficiency anemia    Bilateral hydronephrosis  Resolved Problems:    * No resolved hospital problems. *      Plan:   TONG - starting to improve today - likely combination of urinary retention and ARB use -avoid nephrotoxic medications Rey is in, urology input appreciated awaiting for cystoscopy / TURP nephrology is following, given   fever ,chills and triggers - started on rocephin after urine culture    Iron def anemia due to renal failure /hematuria-status post blood transfusion, monitor closely, Hb stable   Metabolic acidosis -secondary to TONG improving - continue bicarb drip, nephrology is following  Hyperkalemia -  resolved   HTN - started on amlodipine- no more ARB, blood pressure is  soft , parameters with Norvasc   Enlarged prostate - check PSA - started Flomax - urology consulted -input appreciated, cystoscopy / turp . CT of the abdomen- need to  rule out bladder mass  Feeling tired - possibly 2/2 all above - c melatonin and zyrtec   Mild thrombocytopenia - monitor   Prognosis:  Good    Code status:  Full code    DVT prophylaxis: Heparin SQ  GI prophylaxis: none  Antibiotic prophylaxis indicated:  No     PT/OT Eval Status:   []NOT yet ordered []NOT indicated, patient fully functional []Ordered and Pending   []Seen with Recommendations for:  [x]Home independently  []Home w/ assist  []HHC  []SNF  []Acute Rehab    Disposition:  Home in 2-3 day(s).   Pt requires continued admission due to ongoing threat to life and/or bodily function without ongoing treatment due to TONG.  Discussed with patient and family.          Radiology/ECHO:  Kidneys:    The right kidney measures 12.9 cm in length and the  1 person assist

## 2024-11-12 NOTE — ANESTHESIA PRE PROCEDURE
11/10/2024    LABANTI NEG 11/10/2024       Drug/Infectious Status (If Applicable):  No results found for: \"HIV\", \"HEPCAB\"    COVID-19 Screening (If Applicable): No results found for: \"COVID19\"        Anesthesia Evaluation    Airway: Mallampati: III       Mouth opening: < 3 FB   Dental: normal exam         Pulmonary: breath sounds clear to auscultation                             Cardiovascular:            Rhythm: regular  Rate: normal                    Neuro/Psych:               GI/Hepatic/Renal:             Endo/Other:                     Abdominal:   (+) obese          Vascular:          Other Findings:       Anesthesia Plan      general     ASA 3                               Jamison Cheng MD   11/12/2024

## 2024-11-12 NOTE — PROGRESS NOTES
Patient arrived to PACU bay 4, phase one initiated. Placed on bedside monitor, VSS. Report obtained from OR RN and anesthesia. Patient on room air. See flowsheets for assessment Side rails in place, will monitor patient closely.

## 2024-11-12 NOTE — PROGRESS NOTES
[facility] Progress Note           Subjective/   69 y.o. year old male who we are seeing in consultation for TONG.     HPI:  - Renal function is slowly improving.  Intake adequate.  - Still c/o dizziness when he stands    ROS: No shortness of breath, abdominal pain, or back pain.    Objective/   GEN:  Chronically ill, BP (!) 154/64   Pulse (!) 110   Temp 99.5 °F (37.5 °C)   Resp 20   Ht 1.829 m (6')   Wt 105.3 kg (232 lb 1.6 oz)   SpO2 97%   BMI 31.48 kg/m²   HEENT: non-icteric, no JVD  CV: S1, S2 without m/r/g; no LE edema  RESP: CTA B without w/r/r; breathing wnl  ABD: +bs, soft, nt, no hsm, +umbilical hernia, reducible   SKIN: warm, no rashes    Data/  Recent Labs     11/10/24  0558 11/10/24  1453 11/11/24  0529 11/12/24  0511   WBC 4.7  --  4.0 7.7   HGB 6.8* 7.8* 7.9* 7.7*   HCT 20.7* 23.1* 23.6* 22.6*   .7*  --  99.7 98.5     --  170 126*     Recent Labs     11/10/24  0558 11/11/24  0529 11/12/24  0511    140 136   K 4.7 4.2 3.9    107 102   CO2 18* 19* 19*   GLUCOSE 94 95 136*   PHOS 6.4* 5.8* 3.3   BUN 73* 62* 58*   CREATININE 8.1* 7.4* 6.9*   LABGLOM 7* 7* 8*     UA bland, 100 glucose    CK 55    Renal US -> moderate bilateral hydronephrosis, small renal cysts, right kidney 12.9 cm, left kidney 14 cm    CT scan a/p wo contrast from 11/10/24  IMPRESSION:  Bladder wall thickening with internal soft tissue nodularity or dense  material (i.e.  Blood products).  Associated moderate right and severe  left-sided hydroureteronephrosis, which appears reflux related.  Complete  bladder evaluation is limited due to internal Rey catheter and lack of  contrast.  Urological follow-up is recommended for possible cystoscopy to  exclude bladder malignancy.    Lab Results   Component Value Date    IRON 99 11/10/2024    TIBC 193 (L) 11/10/2024    FERRITIN 425.0 (H) 11/10/2024   T sats 51%    Assessment/     - Acute kidney injury: urinary retention + renal

## 2024-11-12 NOTE — PROGRESS NOTES
Dennison removed, new 16 Malagasy dennison placed with Arlen HIGGINBOTHAM. Urine returned for urine sample.

## 2024-11-12 NOTE — INTERVAL H&P NOTE
Update History & Physical    The patient's History and Physical of November 8, 2024 was reviewed with the patient and I examined the patient. There was no change. The surgical site was confirmed by the patient and me.     Plan: The risks, benefits, expected outcome, and alternative to the recommended procedure have been discussed with the patient. Patient understands and wants to proceed with the procedure.     Electronically signed by Neha Medrano MD on 11/12/2024 at 4:21 PM

## 2024-11-13 ENCOUNTER — APPOINTMENT (OUTPATIENT)
Dept: ULTRASOUND IMAGING | Age: 69
DRG: 660 | End: 2024-11-13
Payer: MEDICARE

## 2024-11-13 LAB
25(OH)D3 SERPL-MCNC: 20.6 NG/ML
ANION GAP SERPL CALCULATED.3IONS-SCNC: 16 MMOL/L (ref 3–16)
BUN SERPL-MCNC: 59 MG/DL (ref 7–20)
CALCIUM SERPL-MCNC: 8.1 MG/DL (ref 8.3–10.6)
CHLORIDE SERPL-SCNC: 102 MMOL/L (ref 99–110)
CO2 SERPL-SCNC: 18 MMOL/L (ref 21–32)
CREAT SERPL-MCNC: 5.8 MG/DL (ref 0.8–1.3)
DEPRECATED RDW RBC AUTO: 13.7 % (ref 12.4–15.4)
GFR SERPLBLD CREATININE-BSD FMLA CKD-EPI: 10 ML/MIN/{1.73_M2}
GLUCOSE BLD-MCNC: 122 MG/DL (ref 70–99)
GLUCOSE BLD-MCNC: 123 MG/DL (ref 70–99)
GLUCOSE BLD-MCNC: 140 MG/DL (ref 70–99)
GLUCOSE BLD-MCNC: 144 MG/DL (ref 70–99)
GLUCOSE SERPL-MCNC: 219 MG/DL (ref 70–99)
HCT VFR BLD AUTO: 22 % (ref 40.5–52.5)
HGB BLD-MCNC: 7.4 G/DL (ref 13.5–17.5)
IGA SERPL-MCNC: 126 MG/DL (ref 70–400)
IGG SERPL-MCNC: 1486 MG/DL (ref 700–1600)
IGM SERPL-MCNC: 97.6 MG/DL (ref 40–230)
MCH RBC QN AUTO: 33.1 PG (ref 26–34)
MCHC RBC AUTO-ENTMCNC: 33.5 G/DL (ref 31–36)
MCV RBC AUTO: 98.8 FL (ref 80–100)
PERFORMED ON: ABNORMAL
PLATELET # BLD AUTO: 100 K/UL (ref 135–450)
PMV BLD AUTO: 8.5 FL (ref 5–10.5)
POTASSIUM SERPL-SCNC: 3.7 MMOL/L (ref 3.5–5.1)
RBC # BLD AUTO: 2.23 M/UL (ref 4.2–5.9)
SODIUM SERPL-SCNC: 136 MMOL/L (ref 136–145)
SPE/IFE INTERPRETATION: NORMAL
WBC # BLD AUTO: 10.1 K/UL (ref 4–11)

## 2024-11-13 PROCEDURE — 6360000002 HC RX W HCPCS: Performed by: UROLOGY

## 2024-11-13 PROCEDURE — 2580000003 HC RX 258: Performed by: UROLOGY

## 2024-11-13 PROCEDURE — 6370000000 HC RX 637 (ALT 250 FOR IP): Performed by: INTERNAL MEDICINE

## 2024-11-13 PROCEDURE — 1200000000 HC SEMI PRIVATE

## 2024-11-13 PROCEDURE — 2500000003 HC RX 250 WO HCPCS: Performed by: UROLOGY

## 2024-11-13 PROCEDURE — 82306 VITAMIN D 25 HYDROXY: CPT

## 2024-11-13 PROCEDURE — 51702 INSERT TEMP BLADDER CATH: CPT

## 2024-11-13 PROCEDURE — 36415 COLL VENOUS BLD VENIPUNCTURE: CPT

## 2024-11-13 PROCEDURE — 6370000000 HC RX 637 (ALT 250 FOR IP): Performed by: UROLOGY

## 2024-11-13 PROCEDURE — 80048 BASIC METABOLIC PNL TOTAL CA: CPT

## 2024-11-13 PROCEDURE — 76770 US EXAM ABDO BACK WALL COMP: CPT

## 2024-11-13 PROCEDURE — 85027 COMPLETE CBC AUTOMATED: CPT

## 2024-11-13 RX ORDER — VITAMIN B COMPLEX
2000 TABLET ORAL DAILY
Status: DISCONTINUED | OUTPATIENT
Start: 2024-11-13 | End: 2024-11-19 | Stop reason: HOSPADM

## 2024-11-13 RX ADMIN — TAMSULOSIN HYDROCHLORIDE 0.4 MG: 0.4 CAPSULE ORAL at 07:53

## 2024-11-13 RX ADMIN — SODIUM BICARBONATE: 84 INJECTION, SOLUTION INTRAVENOUS at 04:07

## 2024-11-13 RX ADMIN — HEPARIN SODIUM 5000 UNITS: 5000 INJECTION INTRAVENOUS; SUBCUTANEOUS at 06:49

## 2024-11-13 RX ADMIN — Medication 2000 UNITS: at 17:00

## 2024-11-13 RX ADMIN — SODIUM BICARBONATE: 84 INJECTION, SOLUTION INTRAVENOUS at 20:35

## 2024-11-13 RX ADMIN — SODIUM BICARBONATE: 84 INJECTION, SOLUTION INTRAVENOUS at 12:21

## 2024-11-13 RX ADMIN — AMLODIPINE BESYLATE 5 MG: 5 TABLET ORAL at 07:53

## 2024-11-13 RX ADMIN — CEFTRIAXONE SODIUM 1000 MG: 1 INJECTION, POWDER, FOR SOLUTION INTRAMUSCULAR; INTRAVENOUS at 12:41

## 2024-11-13 ASSESSMENT — PAIN SCALES - GENERAL
PAINLEVEL_OUTOF10: 0

## 2024-11-13 NOTE — OP NOTE
DATE OF SURGERY: 11/12/24  SURGEON: Neha Medrano MD    PRE OP DIAGNOSIS:   1) Acute renal insufficiency  2) High volume urinary retention  3) Benign prostatic hypertrophy  4) Bilateral hydronephrosis     POST OP DIAGNOSIS: Same    PROCEDURES PERFORMED:  1) Transurethral resection of prostate  2) Right ureteral stent placement  3) Right retrograde pyelogram    INDICATIONS FOR PROCEDURE: The patient is a 69 y.o. male with several years duration progressive lower urinary tract symptoms including weak stream and hesitancy.  He was sent to the emergency department due to elevation in his serum creatinine.  At no point did he develop acute urinary retention.  However, upon presentation to the emergency department he had indwelling Rey catheter inserted for a residual volume of over 2 L.  Imaging at that time revealed bilateral hydroureteronephrosis to the bladder.  Since Rey insertion his serum creatinine has very slowly down trended.  The creatinine has down trended much slower than would be expected with catheter decompression.  Additionally, after extensive counseling, the patient wishes to pursue intervention for his BPH.  He has an approximately 70 g prostate by CT imaging.  We have discussed the potential role for transurethral resection of the prostate.  He understands risks including bleeding, infection, injury to the urinary tract, persistent urinary retention, urinary incontinence, retrograde ejaculation and erectile dysfunction.  He wishes to proceed.  Given the very slow improvement in his serum creatinine we have also discussed bilateral retrograde pyelogram with possible bilateral ureteral stent placement.  He is willing to proceed with this if I think it is necessary.    DESCRIPTION OF PROCEDURE:  After informed consent the patient was taken to the operating room and placed under general anesthesia.  The patient was prepped and draped in the standard surgical fashion in the dorsal lithotomy position. 
No bruits; no thyromegaly or nodules

## 2024-11-13 NOTE — PLAN OF CARE
Problem: Chronic Conditions and Co-morbidities  Goal: Patient's chronic conditions and co-morbidity symptoms are monitored and maintained or improved  11/12/2024 2110 by Warren Moreno RN  Outcome: Progressing  11/12/2024 0853 by Jerica Still RN  Outcome: Progressing     Problem: Discharge Planning  Goal: Discharge to home or other facility with appropriate resources  11/12/2024 2110 by Warren Moreno RN  Outcome: Progressing  11/12/2024 0853 by Jerica Still RN  Outcome: Progressing     Problem: Safety - Adult  Goal: Free from fall injury  11/12/2024 2110 by Warren Moreno RN  Outcome: Progressing  11/12/2024 0853 by Jerica Still RN  Outcome: Progressing     Problem: Pain  Goal: Verbalizes/displays adequate comfort level or baseline comfort level  Outcome: Progressing

## 2024-11-13 NOTE — PROGRESS NOTES
[facility] Progress Note           Subjective/   69 y.o. year old male who we are seeing in consultation for TONG.     HPI:  - Renal function is slowly improving.  Intake adequate.  - Still c/o dizziness when he stands    ROS: No shortness of breath, abdominal pain, or back pain.  Social Hx: Discussed with family present.      Objective/   GEN:  Chronically ill, BP (!) 148/82   Pulse 66   Temp 97.4 °F (36.3 °C) (Oral)   Resp 12   Ht 1.829 m (6')   Wt 105.3 kg (232 lb 1.6 oz)   SpO2 99%   BMI 31.48 kg/m²   HEENT: non-icteric, no JVD  CV: S1, S2 without m/r/g; no LE edema  RESP: CTA B without w/r/r; breathing wnl  ABD: +bs, soft, nt, no hsm, +umbilical hernia, reducible   SKIN: warm, no rashes    Data/  Recent Labs     11/11/24  0529 11/12/24  0511 11/13/24  0856   WBC 4.0 7.7 10.1   HGB 7.9* 7.7* 7.4*   HCT 23.6* 22.6* 22.0*   MCV 99.7 98.5 98.8    126* 100*     Recent Labs     11/11/24  0529 11/12/24  0511 11/13/24  0856    136 136   K 4.2 3.9 3.7    102 102   CO2 19* 19* 18*   GLUCOSE 95 136* 219*   PHOS 5.8* 3.3  --    BUN 62* 58* 59*   CREATININE 7.4* 6.9* 5.8*   LABGLOM 7* 8* 10*     UA bland, 100 glucose    CK 55    Renal US -> moderate bilateral hydronephrosis, small renal cysts, right kidney 12.9 cm, left kidney 14 cm    CT scan a/p wo contrast from 11/10/24  IMPRESSION:  Bladder wall thickening with internal soft tissue nodularity or dense  material (i.e.  Blood products).  Associated moderate right and severe  left-sided hydroureteronephrosis, which appears reflux related.  Complete  bladder evaluation is limited due to internal Rey catheter and lack of  contrast.  Urological follow-up is recommended for possible cystoscopy to  exclude bladder malignancy.    Lab Results   Component Value Date    IRON 99 11/10/2024    TIBC 193 (L) 11/10/2024    FERRITIN 425.0 (H) 11/10/2024   T sats 51%    Assessment/     - Acute kidney injury: urinary retention + renal

## 2024-11-13 NOTE — PROGRESS NOTES
Hospitalist Progress Note    CC: TONG (acute kidney injury) ([facility])    Name:  Tylor Cochran /Age/Sex: 1955  (69 y.o. male)   MRN & CSN:  [id number] & [id number] Encounter Date/Time: 2024 7:18 AM EST   Location:  [facility]0544-01 PCP: Isidoro Guaman APRN - CNP     Attending:Kyara Menard MD         Hospital course:  69 y.o. male past medical history of hyperlipidemia, hypertension, BPH, obesity, macrocytic anemia, DAQUAN, prediabetes with recent A1c of 5.4 presents from PCP office with abnormal lab findings.  About 3 months ago he started valsartan.  Found to have hyperkalemia and elevated creatinine as outpatient. Pt was also taking NSAIDs and has difficulty with urination due to prostate hypertrophy.  He has bilateral renal hydronephrosis.  CT scan ordered.  Transfused 1u pRBC.  Urology and nephrology consulted.    Admit date: 2024  Days in hospital:  Hospital Day: 6  Status:  Inpatient            Subjective: s/p   1u pRBC - this admission     S/p Cystoscopy, bilateral retrograde pyelogram and possible stent placement and transurethral resection of prostate    Has dennison , hematuria   No c/o like ab pain , fever or change in mS      ROS:   A comprehensive review of systems was negative except for: difficulty with urination for years, frequency, severe fatigue       Objective:    BP (!) 148/82   Pulse 66   Temp 97.4 °F (36.3 °C) (Oral)   Resp 12   Ht 1.829 m (6')   Wt 105.3 kg (232 lb 1.6 oz)   SpO2 99%   BMI 31.48 kg/m²     Gen: alert, cooperative , obese   HEENT: NC/AT, moist mucous membranes, no oropharyngeal erythema or exudate  Neck: supple, trachea midline, no anterior cervical or SC LAD  Heart:  reg rate and rhythm, no murmurs, no gallops, no rubs. no leg edema  Lungs: clear to auscultation bilaterally- no wheezes, rales or rhonchi, normal air movement, no respiratory distress  Abd: soft, lower ab -tender, non-distended, normal bowel  bodily function without ongoing treatment due to TONG./ hydro  Discussed with patient and family.          Radiology/ECHO:  Kidneys:    The right kidney measures 12.9 cm in length and the left kidney measures 14  cm in length.    Moderate hydronephrosis is seen on the right    Moderate hydronephrosis is seen on the left.    9 mm cyst seen in right kidney    1.7 cm cyst seen in left kidney.      Bladder:    Collapsed and not well evaluated.  Rey catheter is seen      Impression:       Moderate bilateral hydronephrosis    Small renal cysts       Diet:  ADULT DIET; Regular; Low Potassium (Less than 3000 mg/day); Low Phosphorus (Less than 1000 mg)  Diet NPO    Medications:  Personally reviewed in detail in conjunction w/ labs as documented for evidence of drug toxicity.     Infusion Medications    sodium chloride      sodium bicarbonate 75 mEq in sodium chloride 0.45 % 1,000 mL infusion 125 mL/hr at 11/13/24 1221    sodium chloride      dextrose      sodium chloride       Scheduled Medications    cefTRIAXone (ROCEPHIN) IV  1,000 mg IntraVENous Q24H    amLODIPine  5 mg Oral Daily    tamsulosin  0.4 mg Oral Daily    [Held by provider] cetirizine  10 mg Oral Daily    [Held by provider] melatonin  10 mg Oral Nightly    sodium chloride flush  5-40 mL IntraVENous 2 times per day    heparin (porcine)  5,000 Units SubCUTAneous 3 times per day     PRN Meds: sodium chloride, sodium chloride, cloNIDine, sodium chloride, glucose, dextrose bolus **OR** dextrose bolus, glucagon (rDNA), dextrose, sodium chloride flush, sodium chloride, ondansetron **OR** ondansetron, polyethylene glycol, acetaminophen **OR** acetaminophen     Labs:  Personally reviewed and interpreted for clinical significance.     CBC:   Recent Labs     11/11/24  0529 11/12/24  0511 11/13/24  0856   WBC 4.0 7.7 10.1   HGB 7.9* 7.7* 7.4*   HCT 23.6* 22.6* 22.0*   MCV 99.7 98.5 98.8    126* 100*     BMP, Mag, Phos:    Recent Labs     11/11/24  0529

## 2024-11-13 NOTE — CARE COORDINATION
Writer reviewed chart, and spoke with MD, and RN, patient is going to be NPO at 0000 for a possible shunt placement. CBI was clamped earlier this day. IPTA, plans to return home w/ family.    Yoly Vallejo RN

## 2024-11-13 NOTE — PROGRESS NOTES
Pt Name: Tylor Cochran  Medical Record Number: [id number]  Date of Birth 1955   Today's Date: 11/13/2024      Subjective:  Awake in bed, no complaints. CBI minimally running with mostly clear/slight pink tinge output. Stopped CBI and monitor.    ROS: Constitutional: No fever    Vitals:  Vitals:    11/13/24 0406 11/13/24 0725 11/13/24 1052 11/13/24 1135   BP: 115/64 138/80 (!) 148/75 (!) 148/82   Pulse: 72 79 75 66   Resp: 16 16 17 12   Temp: 97.3 °F (36.3 °C) 97.6 °F (36.4 °C) 97.5 °F (36.4 °C) 97.4 °F (36.3 °C)   TempSrc: Axillary Oral Oral Oral   SpO2: 99% 100% 100% 99%   Weight:       Height:         I/O last 3 completed shifts:  In: 500 [I.V.:500]  Out: 9110 [Urine:9110]    Exam:  General: Awake, oriented, no acute distress  Respiratory: Nonlabored breathing  Abdomen: Soft, non-tender, non-distended, no masses  : dennison catheter intact with slight pink tinge output  Skin: Skin color, texture, turgor normal, no rashes or lesions  Neurologic: no gross deficits    CURRENT MEDICATIONS   Scheduled Meds:   cefTRIAXone (ROCEPHIN) IV  1,000 mg IntraVENous Q24H    amLODIPine  5 mg Oral Daily    tamsulosin  0.4 mg Oral Daily    [Held by provider] cetirizine  10 mg Oral Daily    [Held by provider] melatonin  10 mg Oral Nightly    sodium chloride flush  5-40 mL IntraVENous 2 times per day    heparin (porcine)  5,000 Units SubCUTAneous 3 times per day     Continuous Infusions:   sodium chloride      sodium bicarbonate 75 mEq in sodium chloride 0.45 % 1,000 mL infusion 125 mL/hr at 11/13/24 0407    sodium chloride      dextrose      sodium chloride       PRN Meds:.sodium chloride, sodium chloride, cloNIDine, sodium chloride, glucose, dextrose bolus **OR** dextrose bolus, glucagon (rDNA), dextrose, sodium chloride flush, sodium chloride, ondansetron **OR** ondansetron, polyethylene glycol, acetaminophen **OR** acetaminophen    LABS     Recent Labs     11/11/24  0529 11/12/24  0511 11/13/24  0856   WBC 4.0 7.7 10.1

## 2024-11-13 NOTE — PROGRESS NOTES
1039 - Per patient, someone from [facility] came in and clamped CBI, stating that they no longer need it running. Call placed to [facility] at this time to confirm that CBI can be stopped. Writer waiting for callback.     Electronically signed by Smita Alcazar RN on 11/13/2024 at 10:42 AM      1045 - Per Dora Castaneda CNP with Urology, CBI can remain clamped unless urine output becomes increasingly bloody again, which at that time it would be restarted.     Electronically signed by Smita Alcazar RN on 11/13/2024 at 11:09 AM

## 2024-11-14 ENCOUNTER — APPOINTMENT (OUTPATIENT)
Dept: INTERVENTIONAL RADIOLOGY/VASCULAR | Age: 69
DRG: 660 | End: 2024-11-14
Payer: MEDICARE

## 2024-11-14 LAB
ALBUMIN SERPL ELPH-MCNC: 2.9 G/DL (ref 3.1–4.9)
ALBUMIN SERPL-MCNC: 3.4 G/DL (ref 3.4–5)
ALPHA1 GLOB SERPL ELPH-MCNC: 0.2 G/DL (ref 0.2–0.4)
ALPHA2 GLOB SERPL ELPH-MCNC: 0.7 G/DL (ref 0.4–1.1)
ANION GAP SERPL CALCULATED.3IONS-SCNC: 15 MMOL/L (ref 3–16)
ANION GAP SERPL CALCULATED.3IONS-SCNC: 15 MMOL/L (ref 3–16)
B-GLOBULIN SERPL ELPH-MCNC: 0.7 G/DL (ref 0.9–1.6)
BASOPHILS # BLD: 0 K/UL (ref 0–0.2)
BASOPHILS NFR BLD: 0.2 %
BLOOD BANK DISPENSE STATUS: NORMAL
BLOOD BANK DISPENSE STATUS: NORMAL
BLOOD BANK PRODUCT CODE: NORMAL
BLOOD BANK PRODUCT CODE: NORMAL
BPU ID: NORMAL
BPU ID: NORMAL
BUN SERPL-MCNC: 56 MG/DL (ref 7–20)
BUN SERPL-MCNC: 57 MG/DL (ref 7–20)
CALCIUM SERPL-MCNC: 7.9 MG/DL (ref 8.3–10.6)
CALCIUM SERPL-MCNC: 8 MG/DL (ref 8.3–10.6)
CHLORIDE SERPL-SCNC: 105 MMOL/L (ref 99–110)
CHLORIDE SERPL-SCNC: 105 MMOL/L (ref 99–110)
CO2 SERPL-SCNC: 19 MMOL/L (ref 21–32)
CO2 SERPL-SCNC: 21 MMOL/L (ref 21–32)
CREAT SERPL-MCNC: 5.3 MG/DL (ref 0.8–1.3)
CREAT SERPL-MCNC: 5.4 MG/DL (ref 0.8–1.3)
DEPRECATED RDW RBC AUTO: 14.1 % (ref 12.4–15.4)
DESCRIPTION BLOOD BANK: NORMAL
DESCRIPTION BLOOD BANK: NORMAL
EOSINOPHIL # BLD: 0 K/UL (ref 0–0.6)
EOSINOPHIL NFR BLD: 0.1 %
GAMMA GLOB SERPL ELPH-MCNC: 1.6 G/DL (ref 0.6–1.8)
GFR SERPLBLD CREATININE-BSD FMLA CKD-EPI: 11 ML/MIN/{1.73_M2}
GFR SERPLBLD CREATININE-BSD FMLA CKD-EPI: 11 ML/MIN/{1.73_M2}
GLUCOSE BLD-MCNC: 135 MG/DL (ref 70–99)
GLUCOSE BLD-MCNC: 174 MG/DL (ref 70–99)
GLUCOSE BLD-MCNC: 274 MG/DL (ref 70–99)
GLUCOSE SERPL-MCNC: 120 MG/DL (ref 70–99)
GLUCOSE SERPL-MCNC: 128 MG/DL (ref 70–99)
HCT VFR BLD AUTO: 21.6 % (ref 40.5–52.5)
HGB BLD-MCNC: 7.2 G/DL (ref 13.5–17.5)
INR PPP: 1 (ref 0.85–1.15)
LACTATE BLDV-SCNC: 0.7 MMOL/L (ref 0.4–1.9)
LYMPHOCYTES # BLD: 0.3 K/UL (ref 1–5.1)
LYMPHOCYTES NFR BLD: 5.4 %
M PROTEIN 1 SERPL ELPH-MCNC: 0.8 G/DL
MCH RBC QN AUTO: 33.1 PG (ref 26–34)
MCHC RBC AUTO-ENTMCNC: 33.2 G/DL (ref 31–36)
MCV RBC AUTO: 99.7 FL (ref 80–100)
MONOCYTES # BLD: 0.1 K/UL (ref 0–1.3)
MONOCYTES NFR BLD: 2.5 %
NEUTROPHILS # BLD: 5.2 K/UL (ref 1.7–7.7)
NEUTROPHILS NFR BLD: 91.8 %
OSMOLALITY UR: 417 MOSM/KG (ref 390–1070)
PERFORMED ON: ABNORMAL
PHOSPHATE SERPL-MCNC: 2.9 MG/DL (ref 2.5–4.9)
PLATELET # BLD AUTO: 86 K/UL (ref 135–450)
PMV BLD AUTO: 8.3 FL (ref 5–10.5)
POTASSIUM SERPL-SCNC: 3.6 MMOL/L (ref 3.5–5.1)
POTASSIUM SERPL-SCNC: 3.6 MMOL/L (ref 3.5–5.1)
PROT SERPL-MCNC: 6.1 G/DL (ref 6.4–8.2)
PROTHROMBIN TIME: 13.4 SEC (ref 11.9–14.9)
RBC # BLD AUTO: 2.17 M/UL (ref 4.2–5.9)
SODIUM SERPL-SCNC: 139 MMOL/L (ref 136–145)
SODIUM SERPL-SCNC: 141 MMOL/L (ref 136–145)
WBC # BLD AUTO: 5.6 K/UL (ref 4–11)

## 2024-11-14 PROCEDURE — 85025 COMPLETE CBC W/AUTO DIFF WBC: CPT

## 2024-11-14 PROCEDURE — 85610 PROTHROMBIN TIME: CPT

## 2024-11-14 PROCEDURE — 50433 PLMT NEPHROURETERAL CATHETER: CPT

## 2024-11-14 PROCEDURE — 6370000000 HC RX 637 (ALT 250 FOR IP): Performed by: NURSE PRACTITIONER

## 2024-11-14 PROCEDURE — 6370000000 HC RX 637 (ALT 250 FOR IP): Performed by: UROLOGY

## 2024-11-14 PROCEDURE — 99153 MOD SED SAME PHYS/QHP EA: CPT

## 2024-11-14 PROCEDURE — 2500000003 HC RX 250 WO HCPCS: Performed by: RADIOLOGY

## 2024-11-14 PROCEDURE — 36415 COLL VENOUS BLD VENIPUNCTURE: CPT

## 2024-11-14 PROCEDURE — 6360000004 HC RX CONTRAST MEDICATION: Performed by: RADIOLOGY

## 2024-11-14 PROCEDURE — 2709999900 IR GUIDED URETERAL STENT PLACE W NEPHRO CATH NEW ACCESS

## 2024-11-14 PROCEDURE — 51702 INSERT TEMP BLADDER CATH: CPT

## 2024-11-14 PROCEDURE — 0T9130Z DRAINAGE OF LEFT KIDNEY WITH DRAINAGE DEVICE, PERCUTANEOUS APPROACH: ICD-10-PCS | Performed by: RADIOLOGY

## 2024-11-14 PROCEDURE — 87150 DNA/RNA AMPLIFIED PROBE: CPT

## 2024-11-14 PROCEDURE — 83970 ASSAY OF PARATHORMONE: CPT

## 2024-11-14 PROCEDURE — 1200000000 HC SEMI PRIVATE

## 2024-11-14 PROCEDURE — C1769 GUIDE WIRE: HCPCS

## 2024-11-14 PROCEDURE — 83605 ASSAY OF LACTIC ACID: CPT

## 2024-11-14 PROCEDURE — 87040 BLOOD CULTURE FOR BACTERIA: CPT

## 2024-11-14 PROCEDURE — 6360000002 HC RX W HCPCS: Performed by: UROLOGY

## 2024-11-14 PROCEDURE — 2580000003 HC RX 258: Performed by: UROLOGY

## 2024-11-14 PROCEDURE — 2580000003 HC RX 258: Performed by: INTERNAL MEDICINE

## 2024-11-14 PROCEDURE — 87186 SC STD MICRODIL/AGAR DIL: CPT

## 2024-11-14 PROCEDURE — 80069 RENAL FUNCTION PANEL: CPT

## 2024-11-14 PROCEDURE — 2580000003 HC RX 258: Performed by: RADIOLOGY

## 2024-11-14 PROCEDURE — 6360000002 HC RX W HCPCS: Performed by: RADIOLOGY

## 2024-11-14 PROCEDURE — 83935 ASSAY OF URINE OSMOLALITY: CPT

## 2024-11-14 PROCEDURE — 2500000003 HC RX 250 WO HCPCS: Performed by: INTERNAL MEDICINE

## 2024-11-14 PROCEDURE — 2500000003 HC RX 250 WO HCPCS: Performed by: UROLOGY

## 2024-11-14 PROCEDURE — 99152 MOD SED SAME PHYS/QHP 5/>YRS: CPT

## 2024-11-14 RX ORDER — FENTANYL CITRATE 50 UG/ML
INJECTION, SOLUTION INTRAMUSCULAR; INTRAVENOUS PRN
Status: COMPLETED | OUTPATIENT
Start: 2024-11-14 | End: 2024-11-14

## 2024-11-14 RX ORDER — MIDAZOLAM HYDROCHLORIDE 1 MG/ML
INJECTION, SOLUTION INTRAMUSCULAR; INTRAVENOUS PRN
Status: COMPLETED | OUTPATIENT
Start: 2024-11-14 | End: 2024-11-14

## 2024-11-14 RX ORDER — SODIUM CHLORIDE 9 MG/ML
INJECTION, SOLUTION INTRAVENOUS CONTINUOUS PRN
Status: COMPLETED | OUTPATIENT
Start: 2024-11-14 | End: 2024-11-14

## 2024-11-14 RX ORDER — SODIUM CHLORIDE 450 MG/100ML
INJECTION, SOLUTION INTRAVENOUS CONTINUOUS
Status: DISCONTINUED | OUTPATIENT
Start: 2024-11-14 | End: 2024-11-15

## 2024-11-14 RX ORDER — LIDOCAINE HYDROCHLORIDE 10 MG/ML
INJECTION, SOLUTION EPIDURAL; INFILTRATION; INTRACAUDAL; PERINEURAL PRN
Status: COMPLETED | OUTPATIENT
Start: 2024-11-14 | End: 2024-11-14

## 2024-11-14 RX ADMIN — IOVERSOL 40 ML: 678 INJECTION INTRA-ARTERIAL; INTRAVENOUS at 12:34

## 2024-11-14 RX ADMIN — ACETAMINOPHEN 650 MG: 650 SUPPOSITORY RECTAL at 04:31

## 2024-11-14 RX ADMIN — FENTANYL CITRATE 50 MCG: 50 INJECTION, SOLUTION INTRAMUSCULAR; INTRAVENOUS at 11:24

## 2024-11-14 RX ADMIN — MIDAZOLAM 1 MG: 1 INJECTION INTRAMUSCULAR; INTRAVENOUS at 11:25

## 2024-11-14 RX ADMIN — Medication 10 MG: at 22:14

## 2024-11-14 RX ADMIN — ACETAMINOPHEN 650 MG: 325 TABLET ORAL at 13:08

## 2024-11-14 RX ADMIN — SODIUM BICARBONATE: 84 INJECTION, SOLUTION INTRAVENOUS at 12:51

## 2024-11-14 RX ADMIN — SODIUM CHLORIDE: 4.5 INJECTION, SOLUTION INTRAVENOUS at 19:58

## 2024-11-14 RX ADMIN — SODIUM BICARBONATE: 84 INJECTION, SOLUTION INTRAVENOUS at 04:24

## 2024-11-14 RX ADMIN — SODIUM CHLORIDE 250 ML: 9 INJECTION, SOLUTION INTRAVENOUS at 11:35

## 2024-11-14 RX ADMIN — CEFTRIAXONE SODIUM 1000 MG: 1 INJECTION, POWDER, FOR SOLUTION INTRAMUSCULAR; INTRAVENOUS at 13:08

## 2024-11-14 RX ADMIN — LIDOCAINE HYDROCHLORIDE 10 ML: 10 INJECTION, SOLUTION EPIDURAL; INFILTRATION; INTRACAUDAL; PERINEURAL at 12:33

## 2024-11-14 RX ADMIN — FENTANYL CITRATE 50 MCG: 50 INJECTION, SOLUTION INTRAMUSCULAR; INTRAVENOUS at 12:18

## 2024-11-14 ASSESSMENT — PAIN SCALES - GENERAL
PAINLEVEL_OUTOF10: 0

## 2024-11-14 NOTE — PRE SEDATION
Sedation Pre-Procedure Note    Patient Name: Tylor Cochran   YOB: 1955  Room/Bed: 0544/0544-01  Medical Record Number: [id number]  Date: 11/14/2024   Time: 10:54 AM       Indication:  Left Nephrostomy Vs Nephroureteral Catheter Placement    Consent: I have discussed with the patient and/or the patient representative the indication, alternatives, and the possible risks and/or complications of the planned procedure and the anesthesia methods. The patient and/or patient representative appear to understand and agree to proceed.    Vital Signs:   Vitals:    11/14/24 0840   BP: (!) 95/58   Pulse: 98   Resp: 16   Temp: 99.5 °F (37.5 °C)   SpO2: 95%       Past Medical History:   has a past medical history of Acute deep vein thrombosis (DVT) of left lower extremity (HCC), Allergic rhinitis, BPH associated with nocturia, Cardiac septal defect, Diverticulosis, DVT (deep venous thrombosis) (HCC), History of colonic polyps, Hyperlipemia, Insomnia, Obesity, DAQUAN on CPAP, DAQUAN on CPAP, Patellofemoral arthritis of left knee, Plantar fasciitis, Prostatitis, Status post dilation of urethral narrowing, Type 2 diabetes mellitus without complication (HCC), and Type 2 diabetes mellitus without complication (HCC).    Past Surgical History:   has a past surgical history that includes Urethra dilation; cyst removal; Colonoscopy (11/2005); Colonoscopy (11/20/2008); Colonoscopy (N/A, 11/20/2013); and Cystoscopy (Bilateral, 11/12/2024).    Medications:   Scheduled Meds:    sodium bicarbonate 75 mEq in sodium chloride 0.45 % 1,000 mL infusion   IntraVENous Once    Vitamin D  2,000 Units Oral Daily    cefTRIAXone (ROCEPHIN) IV  1,000 mg IntraVENous Q24H    amLODIPine  5 mg Oral Daily    tamsulosin  0.4 mg Oral Daily    [Held by provider] cetirizine  10 mg Oral Daily    melatonin  10 mg Oral Nightly    sodium chloride flush  5-40 mL IntraVENous 2 times per day    [Held by provider] heparin (porcine)  5,000 Units SubCUTAneous 3 times

## 2024-11-14 NOTE — PROGRESS NOTES
Pt Name: Tylor Cochran  Medical Record Number: [id number]  Date of Birth 1955   Today's Date: 11/14/2024      Subjective:  Patient is off floor right now in IR. Had discussion with wife at bedside.     ROS: Constitutional: fever up to 102.1 overnight    Vitals:  Vitals:    11/14/24 1215 11/14/24 1219 11/14/24 1224 11/14/24 1229   BP: (!) 100/53 (!) 101/58 133/66 128/73   Pulse: 81 82 89 99   Resp: 18 18 20 20   Temp:       TempSrc:       SpO2: 98% 98% 95% 98%   Weight:       Height:         I/O last 3 completed shifts:  In: 2160 [P.O.:2160]  Out: 23604 [Urine:13356]    Exam:  LAMAR off the floor    CURRENT MEDICATIONS   Scheduled Meds:   sodium bicarbonate 75 mEq in sodium chloride 0.45 % 1,000 mL infusion   IntraVENous Once    Vitamin D  2,000 Units Oral Daily    cefTRIAXone (ROCEPHIN) IV  1,000 mg IntraVENous Q24H    amLODIPine  5 mg Oral Daily    tamsulosin  0.4 mg Oral Daily    [Held by provider] cetirizine  10 mg Oral Daily    melatonin  10 mg Oral Nightly    sodium chloride flush  5-40 mL IntraVENous 2 times per day    [Held by provider] heparin (porcine)  5,000 Units SubCUTAneous 3 times per day     Continuous Infusions:   sodium chloride      sodium chloride 250 mL (11/14/24 1135)    sodium chloride      sodium chloride      dextrose      sodium chloride       PRN Meds:.fentanNYL, midazolam, sodium chloride, lidocaine PF, ioversol, sodium chloride, sodium chloride, cloNIDine, sodium chloride, glucose, dextrose bolus **OR** dextrose bolus, glucagon (rDNA), dextrose, sodium chloride flush, sodium chloride, ondansetron **OR** ondansetron, polyethylene glycol, acetaminophen **OR** acetaminophen    LABS     Recent Labs     11/12/24  0511 11/13/24  0856 11/14/24  0501 11/14/24  0818   WBC 7.7 10.1  --  5.6   HGB 7.7* 7.4*  --  7.2*   HCT 22.6* 22.0*  --  21.6*   * 100*  --  86*    136 139 141   K 3.9 3.7 3.6 3.6    102 105 105   CO2 19* 18* 19* 21   BUN 58* 59* 57* 56*   CREATININE 6.9*

## 2024-11-14 NOTE — PROGRESS NOTES
[facility] Progress Note           Subjective/   69 y.o. year old male who we are seeing in consultation for TONG.     HPI:  - Renal function is slowly improving.  Intake adequate.  - Still c/o dizziness when he stands    ROS: No shortness of breath, abdominal pain, or back pain. +fever  Social Hx: Discussed with family present.      Objective/   GEN:  Chronically ill, BP (!) 149/68   Pulse (!) 109   Temp (!) 100.5 °F (38.1 °C) (Oral)   Resp 14   Ht 1.829 m (6')   Wt 105.3 kg (232 lb 1.6 oz)   SpO2 95%   BMI 31.48 kg/m²   HEENT: non-icteric, no JVD  CV: S1, S2 without m/r/g; no LE edema  RESP: CTA B without w/r/r; breathing wnl  ABD: +bs, soft, nt, no hsm, +umbilical hernia, reducible   SKIN: warm, no rashes    Data/  Recent Labs     11/12/24  0511 11/13/24  0856   WBC 7.7 10.1   HGB 7.7* 7.4*   HCT 22.6* 22.0*   MCV 98.5 98.8   * 100*     Recent Labs     11/12/24  0511 11/13/24  0856 11/14/24  0501    136 139   K 3.9 3.7 3.6    102 105   CO2 19* 18* 19*   GLUCOSE 136* 219* 128*   PHOS 3.3  --  2.9   BUN 58* 59* 57*   CREATININE 6.9* 5.8* 5.4*   LABGLOM 8* 10* 11*     UA bland, 100 glucose    CK 55    Renal US -> moderate bilateral hydronephrosis, small renal cysts, right kidney 12.9 cm, left kidney 14 cm    CT scan a/p wo contrast from 11/10/24  IMPRESSION:  Bladder wall thickening with internal soft tissue nodularity or dense  material (i.e.  Blood products).  Associated moderate right and severe  left-sided hydroureteronephrosis, which appears reflux related.  Complete  bladder evaluation is limited due to internal Rey catheter and lack of  contrast.  Urological follow-up is recommended for possible cystoscopy to  exclude bladder malignancy.    Lab Results   Component Value Date    IRON 99 11/10/2024    TIBC 193 (L) 11/10/2024    FERRITIN 425.0 (H) 11/10/2024   T sats 51%    Assessment/     - Acute kidney injury: urinary retention + renal hypoperfusion injury

## 2024-11-14 NOTE — PROGRESS NOTES
Hospitalist Progress Note    CC: TONG (acute kidney injury) ([facility])    Name:  Tylor Cochran /Age/Sex: 1955  (69 y.o. male)   MRN & CSN:  [id number] & [id number] Encounter Date/Time: 2024 7:18 AM EST   Location:  [address] [place], [place] [id number]- PCP: Isidoro Guaman APRMACO - CNP     Attending:Kyara Menard MD         Hospital course:  69 y.o. male past medical history of hyperlipidemia, hypertension, BPH, obesity, macrocytic anemia, DAQUAN, prediabetes with recent A1c of 5.4 presents from PCP office with abnormal lab findings.  About 3 months ago he started valsartan.  Found to have hyperkalemia and elevated creatinine as outpatient. Pt was also taking NSAIDs and has difficulty with urination due to prostate hypertrophy.  He has bilateral renal hydronephrosis.  CT scan ordered.  Transfused 1u pRBC.  Urology and nephrology consulted.    Admit date: 2024  Days in hospital:  Hospital Day: 7  Status:  Inpatient            Subjective: s/p   1u pRBC - this admission     S/p Cystoscopy, bilateral retrograde pyelogram and possible stent placement and transurethral resection of prostate    Has dennison , hematuria   CBI - dc ed today   No c/o like ab pain ,    Has fever and not feeling well   BP is soft   No N , V or change in MS      ROS:   A comprehensive review of systems was negative except for: difficulty with urination for years, frequency, severe fatigue       Objective:    BP (!) 149/68   Pulse (!) 109   Temp (!) 100.5 °F (38.1 °C) (Oral)   Resp 14   Ht 1.829 m (6')   Wt 105.3 kg (232 lb 1.6 oz)   SpO2 95%   BMI 31.48 kg/m²     Gen: alert, cooperative , obese   HEENT: NC/AT, moist mucous membranes, no oropharyngeal erythema or exudate  Neck: supple, trachea midline, no anterior cervical or SC LAD  Heart:  reg rate and rhythm, no murmurs, no gallops, no rubs. no leg edema  Lungs: clear to auscultation bilaterally- no wheezes, rales or rhonchi, normal air movement,  prophylaxis: Heparin SQ  GI prophylaxis: none  Antibiotic prophylaxis indicated:  No     PT/OT Eval Status:   []NOT yet ordered []NOT indicated, patient fully functional []Ordered and Pending   []Seen with Recommendations for:  [x]Home independently  []Home w/ assist  []HHC  []SNF  []Acute Rehab    Disposition:  Home in 2-3 day(s).   Pt requires continued admission due to ongoing threat to life and/or bodily function without ongoing treatment due to TONG./ hydro  Discussed with patient and family.          Radiology/ECHO:  Kidneys:    The right kidney measures 12.9 cm in length and the left kidney measures 14  cm in length.    Moderate hydronephrosis is seen on the right    Moderate hydronephrosis is seen on the left.    9 mm cyst seen in right kidney    1.7 cm cyst seen in left kidney.      Bladder:    Collapsed and not well evaluated.  Rey catheter is seen      Impression:       Moderate bilateral hydronephrosis    Small renal cysts       Diet:  Diet NPO    Medications:  Personally reviewed in detail in conjunction w/ labs as documented for evidence of drug toxicity.     Infusion Medications    sodium chloride      sodium bicarbonate 75 mEq in sodium chloride 0.45 % 1,000 mL infusion 125 mL/hr at 11/14/24 0424    sodium chloride      dextrose      sodium chloride       Scheduled Medications    Vitamin D  2,000 Units Oral Daily    cefTRIAXone (ROCEPHIN) IV  1,000 mg IntraVENous Q24H    amLODIPine  5 mg Oral Daily    tamsulosin  0.4 mg Oral Daily    [Held by provider] cetirizine  10 mg Oral Daily    melatonin  10 mg Oral Nightly    sodium chloride flush  5-40 mL IntraVENous 2 times per day    [Held by provider] heparin (porcine)  5,000 Units SubCUTAneous 3 times per day     PRN Meds: sodium chloride, sodium chloride, cloNIDine, sodium chloride, glucose, dextrose bolus **OR** dextrose bolus, glucagon (rDNA), dextrose, sodium chloride flush, sodium chloride, ondansetron **OR** ondansetron, polyethylene glycol,

## 2024-11-14 NOTE — CARE COORDINATION
Writer reviewed chart, patient went for a shunt this day, possible DC 11/15 if medically cleared.     Yoly Vallejo RN

## 2024-11-14 NOTE — OR NURSING
Image guided [employer] Nephroureteral Stent with Double J-Tube  insertion left completed. Dr. Kwon placed 8 [place] 22 cm Percuflex Nephroureteral Stent/Drain LOT # [id number] EXP 05/15/2027 in the left. Drain/tube secured at the with suture. 10 milliliters of yellow colored withdrawn immediately. Specimen sent to lab for culture. Drain/tube dressing clean, dry, and intact. Pt tolerated procedure without any signs or symptoms of distress. Vital signs stable. Report called to Floor RN. Pt transported back to [facility] in stable condition via bed by transport.     Medications  Versed: 1 mg  Fentanyl: 100 mcg    Vital Signs  Vitals:    11/14/24 1224   BP: 133/66   Pulse: 89   Resp: 20   Temp:    SpO2: 95%    (vital signs in table format)    Post Claudia  2 - Able to move 4 extremities voluntarily on command  2 - BP+/- 20mmHg of normal  2 - Fully awake  2 - Able to maintain oxygen saturation >92% on room air  2 - Able to breathe deeply and cough freely

## 2024-11-14 NOTE — PLAN OF CARE
Problem: Chronic Conditions and Co-morbidities  Goal: Patient's chronic conditions and co-morbidity symptoms are monitored and maintained or improved  11/13/2024 2046 by Warren Moreno RN  Outcome: Progressing  11/13/2024 1110 by Smita Alcazar RN  Outcome: Progressing     Problem: Discharge Planning  Goal: Discharge to home or other facility with appropriate resources  11/13/2024 2046 by Warren Moreno RN  Outcome: Progressing  11/13/2024 1110 by Smita Alcazar RN  Outcome: Progressing     Problem: Safety - Adult  Goal: Free from fall injury  11/13/2024 2046 by Warren Moreno RN  Outcome: Progressing  11/13/2024 1110 by Smita Alcazar RN  Outcome: Progressing     Problem: Pain  Goal: Verbalizes/displays adequate comfort level or baseline comfort level  11/13/2024 2046 by Warren Moreno RN  Outcome: Progressing  11/13/2024 1110 by Smita Alcazar RN  Outcome: Progressing

## 2024-11-14 NOTE — PROGRESS NOTES
1718 - Call placed to [facility] regarding no output in dennison Since 1300. Waiting for callback.     Electronically signed by Smita Alcazar RN on 11/14/2024 at 5:20 PM

## 2024-11-14 NOTE — PLAN OF CARE
Problem: Chronic Conditions and Co-morbidities  Goal: Patient's chronic conditions and co-morbidity symptoms are monitored and maintained or improved  11/14/2024 1003 by Smita Alcazar, RN  Outcome: Progressing     Problem: Discharge Planning  Goal: Discharge to home or other facility with appropriate resources  11/14/2024 1003 by Smita Alcazar, RN  Outcome: Progressing     Problem: Safety - Adult  Goal: Free from fall injury  11/14/2024 1003 by Smita Alcazar, RN  Outcome: Progressing     Problem: Pain  Goal: Verbalizes/displays adequate comfort level or baseline comfort level  11/14/2024 1003 by Smita Alcazar, RN  Outcome: Progressing

## 2024-11-14 NOTE — OR NURSING
Pt arrived for image guided nephrostomy Tube insertion left . Procedure explained including the risk and benefits of the procedure. All questions answered. Pt verbalizes understanding of the procedure and states no more questions. Consent confirmed. Vital signs stable. Labs, allergies, medications, and code status reviewed. No contraindications noted.     Vital Signs  Vitals:    11/14/24 0840   BP: (!) 95/58   Pulse: 98   Resp: 16   Temp: 99.5 °F (37.5 °C)   SpO2: 95%    (vital signs in table format)    Pre Claudia Score  2 - Able to move 4 extremities voluntarily on command  2 - BP+/- 20mmHg of normal  2 - Fully awake  2 - Able to maintain oxygen saturation >92% on room air  2 - Able to breathe deeply and cough freely    Allergies  Valsartan, Cipro xr, and Ciprofloxacin (allergies)    Labs  Lab Results   Component Value Date    INR 1.00 11/14/2024    PROTIME 13.4 11/14/2024     Lab Results   Component Value Date    CREATININE 5.3 (HH) 11/14/2024    BUN 56 (H) 11/14/2024     11/14/2024    K 3.6 11/14/2024     11/14/2024    CO2 21 11/14/2024     Lab Results   Component Value Date    WBC 5.6 11/14/2024    HGB 7.2 (L) 11/14/2024    HCT 21.6 (L) 11/14/2024    MCV 99.7 11/14/2024    PLT 86 (L) 11/14/2024

## 2024-11-14 NOTE — BRIEF OP NOTE
Brief Postoperative Note      Patient: Tylor Cochran  YOB: 1955  MRN: [id number]    Date of Procedure: 11/14/2024    Left Hydronephrosis    Post-Op Diagnosis: Same       Left percutaneous Nephroureteral Tube Placement    Surgeon(s):  Nery Kwon MD    Anesthesia: Moderate Sedation    Estimated Blood Loss (mL): Minimal    Complications: None    Implants:  8 fr x 22 cm NU      Findings:  Successful percutaneous left nephroureteral catheter placement    Electronically signed by Nery Kwon MD on 11/14/2024 at 1:45 PM

## 2024-11-15 LAB
ALBUMIN SERPL-MCNC: 2.9 G/DL (ref 3.4–5)
ANION GAP SERPL CALCULATED.3IONS-SCNC: 12 MMOL/L (ref 3–16)
BLOOD BANK DISPENSE STATUS: NORMAL
BLOOD BANK PRODUCT CODE: NORMAL
BPU ID: NORMAL
BUN SERPL-MCNC: 51 MG/DL (ref 7–20)
CALCIUM SERPL-MCNC: 7.4 MG/DL (ref 8.3–10.6)
CHLORIDE SERPL-SCNC: 105 MMOL/L (ref 99–110)
CO2 SERPL-SCNC: 19 MMOL/L (ref 21–32)
CREAT SERPL-MCNC: 4.6 MG/DL (ref 0.8–1.3)
DEPRECATED RDW RBC AUTO: 14 % (ref 12.4–15.4)
DESCRIPTION BLOOD BANK: NORMAL
GFR SERPLBLD CREATININE-BSD FMLA CKD-EPI: 13 ML/MIN/{1.73_M2}
GLUCOSE BLD-MCNC: 111 MG/DL (ref 70–99)
GLUCOSE BLD-MCNC: 140 MG/DL (ref 70–99)
GLUCOSE BLD-MCNC: 140 MG/DL (ref 70–99)
GLUCOSE BLD-MCNC: 168 MG/DL (ref 70–99)
GLUCOSE SERPL-MCNC: 114 MG/DL (ref 70–99)
HCT VFR BLD AUTO: 19.4 % (ref 40.5–52.5)
HCT VFR BLD AUTO: 22.1 % (ref 40.5–52.5)
HGB BLD-MCNC: 6.4 G/DL (ref 13.5–17.5)
HGB BLD-MCNC: 7.4 G/DL (ref 13.5–17.5)
LDH SERPL L TO P-CCNC: 233 U/L (ref 100–190)
MCH RBC QN AUTO: 32.8 PG (ref 26–34)
MCHC RBC AUTO-ENTMCNC: 33 G/DL (ref 31–36)
MCV RBC AUTO: 99.5 FL (ref 80–100)
PERFORMED ON: ABNORMAL
PHOSPHATE SERPL-MCNC: 3.8 MG/DL (ref 2.5–4.9)
PLATELET # BLD AUTO: 79 K/UL (ref 135–450)
PMV BLD AUTO: 8.6 FL (ref 5–10.5)
POTASSIUM SERPL-SCNC: 3.7 MMOL/L (ref 3.5–5.1)
PTH-INTACT SERPL-MCNC: 219 PG/ML (ref 14–72)
RBC # BLD AUTO: 1.95 M/UL (ref 4.2–5.9)
REPORT: NORMAL
SODIUM SERPL-SCNC: 136 MMOL/L (ref 136–145)
WBC # BLD AUTO: 3.8 K/UL (ref 4–11)

## 2024-11-15 PROCEDURE — 85014 HEMATOCRIT: CPT

## 2024-11-15 PROCEDURE — 6370000000 HC RX 637 (ALT 250 FOR IP): Performed by: UROLOGY

## 2024-11-15 PROCEDURE — 85018 HEMOGLOBIN: CPT

## 2024-11-15 PROCEDURE — 2580000003 HC RX 258: Performed by: NURSE PRACTITIONER

## 2024-11-15 PROCEDURE — 80069 RENAL FUNCTION PANEL: CPT

## 2024-11-15 PROCEDURE — 6370000000 HC RX 637 (ALT 250 FOR IP): Performed by: NURSE PRACTITIONER

## 2024-11-15 PROCEDURE — 86022 PLATELET ANTIBODIES: CPT

## 2024-11-15 PROCEDURE — 36415 COLL VENOUS BLD VENIPUNCTURE: CPT

## 2024-11-15 PROCEDURE — 2580000003 HC RX 258: Performed by: UROLOGY

## 2024-11-15 PROCEDURE — 85027 COMPLETE CBC AUTOMATED: CPT

## 2024-11-15 PROCEDURE — 83010 ASSAY OF HAPTOGLOBIN QUANT: CPT

## 2024-11-15 PROCEDURE — 6360000002 HC RX W HCPCS: Performed by: UROLOGY

## 2024-11-15 PROCEDURE — 83615 LACTATE (LD) (LDH) ENZYME: CPT

## 2024-11-15 PROCEDURE — 1200000000 HC SEMI PRIVATE

## 2024-11-15 PROCEDURE — 2580000003 HC RX 258: Performed by: INTERNAL MEDICINE

## 2024-11-15 PROCEDURE — 36430 TRANSFUSION BLD/BLD COMPNT: CPT

## 2024-11-15 PROCEDURE — 6370000000 HC RX 637 (ALT 250 FOR IP): Performed by: INTERNAL MEDICINE

## 2024-11-15 RX ORDER — SODIUM CHLORIDE 9 MG/ML
INJECTION, SOLUTION INTRAVENOUS PRN
Status: COMPLETED | OUTPATIENT
Start: 2024-11-15 | End: 2024-11-15

## 2024-11-15 RX ORDER — INSULIN LISPRO 100 [IU]/ML
0-4 INJECTION, SOLUTION INTRAVENOUS; SUBCUTANEOUS
Status: DISCONTINUED | OUTPATIENT
Start: 2024-11-15 | End: 2024-11-19 | Stop reason: HOSPADM

## 2024-11-15 RX ORDER — SODIUM CHLORIDE, SODIUM LACTATE, POTASSIUM CHLORIDE, CALCIUM CHLORIDE 600; 310; 30; 20 MG/100ML; MG/100ML; MG/100ML; MG/100ML
INJECTION, SOLUTION INTRAVENOUS CONTINUOUS
Status: DISCONTINUED | OUTPATIENT
Start: 2024-11-15 | End: 2024-11-16

## 2024-11-15 RX ORDER — SODIUM CHLORIDE 450 MG/100ML
INJECTION, SOLUTION INTRAVENOUS CONTINUOUS
Status: DISCONTINUED | OUTPATIENT
Start: 2024-11-15 | End: 2024-11-15

## 2024-11-15 RX ORDER — SODIUM CHLORIDE, SODIUM LACTATE, POTASSIUM CHLORIDE, CALCIUM CHLORIDE 600; 310; 30; 20 MG/100ML; MG/100ML; MG/100ML; MG/100ML
INJECTION, SOLUTION INTRAVENOUS CONTINUOUS
Status: DISCONTINUED | OUTPATIENT
Start: 2024-11-15 | End: 2024-11-15

## 2024-11-15 RX ORDER — AMLODIPINE BESYLATE 2.5 MG/1
2.5 TABLET ORAL DAILY
Status: DISCONTINUED | OUTPATIENT
Start: 2024-11-16 | End: 2024-11-18

## 2024-11-15 RX ADMIN — SODIUM CHLORIDE: 900 INJECTION INTRAVENOUS at 12:04

## 2024-11-15 RX ADMIN — TAMSULOSIN HYDROCHLORIDE 0.4 MG: 0.4 CAPSULE ORAL at 08:18

## 2024-11-15 RX ADMIN — SODIUM CHLORIDE, POTASSIUM CHLORIDE, SODIUM LACTATE AND CALCIUM CHLORIDE: 600; 310; 30; 20 INJECTION, SOLUTION INTRAVENOUS at 12:40

## 2024-11-15 RX ADMIN — AMLODIPINE BESYLATE 5 MG: 5 TABLET ORAL at 08:18

## 2024-11-15 RX ADMIN — SODIUM CHLORIDE 50 ML: 9 INJECTION, SOLUTION INTRAVENOUS at 13:25

## 2024-11-15 RX ADMIN — Medication 2000 UNITS: at 08:18

## 2024-11-15 RX ADMIN — Medication 10 MG: at 20:43

## 2024-11-15 RX ADMIN — CEFTRIAXONE SODIUM 1000 MG: 1 INJECTION, POWDER, FOR SOLUTION INTRAMUSCULAR; INTRAVENOUS at 12:05

## 2024-11-15 ASSESSMENT — PAIN SCALES - GENERAL
PAINLEVEL_OUTOF10: 0

## 2024-11-15 NOTE — FLOWSHEET NOTE
First 15 minutes of transfusion completed. All vitals remained stable. Transfusion rate increased to 100 mL/hr.        11/15/24 1346   Vitals   /61   Temp 97.8 °F (36.6 °C)   Temp Source Oral   Pulse 80   Respirations 16   SpO2 98 %

## 2024-11-15 NOTE — CARE COORDINATION
Writer reviewed chart, and spoke with RN, patient is getting PRBC today. Patient may be ready to return home 11/16 if H&H remains good.     Yoly Vallejo RN

## 2024-11-15 NOTE — FLOWSHEET NOTE
Blood transfusion started. Informed consent verified.      11/15/24 1331   Vitals   BP (!) 105/52   Temp 98 °F (36.7 °C)   Temp Source Oral   Pulse 75   Respirations 16   SpO2 99 %

## 2024-11-15 NOTE — PROGRESS NOTES
Pt Name: Tylor Cochran  Medical Record Number: [id number]  Date of Birth 1955   Today's Date: 11/15/2024      Subjective:  Awake in bed, feeling great.      ROS: Constitutional: No fever    Vitals:  Vitals:    11/14/24 2326 11/14/24 2327 11/15/24 0251 11/15/24 0813   BP:  (!) 98/45 (!) 109/56 137/75   Pulse: 75 75 68 72   Resp:  16 16 16   Temp:  99.1 °F (37.3 °C) 98 °F (36.7 °C) 98 °F (36.7 °C)   TempSrc:  Axillary Oral Oral   SpO2: 96% 96% 98% 99%   Weight:       Height:         I/O last 3 completed shifts:  In: -   Out: 6350 [Urine:6350]    Exam:  General: Awake, oriented, no acute distress  Respiratory: Nonlabored breathing  Abdomen: Soft, non-tender, non-distended, no masses  : dennison catheter intact with clear/yellow output, nephrostomy drain intact with mostly clear/slight pink tinge output  Skin: Skin color, texture, turgor normal, no rashes or lesions  Neurologic: no gross deficits       CURRENT MEDICATIONS   Scheduled Meds:   [START ON 11/16/2024] amLODIPine  2.5 mg Oral Daily    Vitamin D  2,000 Units Oral Daily    cefTRIAXone (ROCEPHIN) IV  1,000 mg IntraVENous Q24H    tamsulosin  0.4 mg Oral Daily    [Held by provider] cetirizine  10 mg Oral Daily    melatonin  10 mg Oral Nightly    sodium chloride flush  5-40 mL IntraVENous 2 times per day    [Held by provider] heparin (porcine)  5,000 Units SubCUTAneous 3 times per day     Continuous Infusions:   sodium chloride 150 mL/hr at 11/14/24 1958    sodium chloride      sodium chloride      dextrose      sodium chloride       PRN Meds:.sodium chloride, sodium chloride, cloNIDine, sodium chloride, glucose, dextrose bolus **OR** dextrose bolus, glucagon (rDNA), dextrose, sodium chloride flush, sodium chloride, ondansetron **OR** ondansetron, polyethylene glycol, acetaminophen **OR** acetaminophen    LABS     Recent Labs     11/13/24  0856 11/14/24  0501 11/14/24  0818 11/15/24  0500   WBC 10.1  --  5.6  --    HGB 7.4*  --  7.2*  --    HCT 22.0*  --   21.6*  --    *  --  86*  --     139 141 136   K 3.7 3.6 3.6 3.7    105 105 105   CO2 18* 19* 21 19*   BUN 59* 57* 56* 51*   CREATININE 5.8* 5.4* 5.3* 4.6*   PHOS  --  2.9  --  3.8   CALCIUM 8.1* 7.9* 8.0* 7.4*   INR  --  1.00  --   --        ASSESSMENT   Hospital day # 7  69y.o. male admitted with TONG and urinary retention with bilateral hydronephrosis. Rey catheter placed for 2L output.   TONG continues to slowly improve - 8.1 - 7.4 - 6.9 - 5.8 - 4.6 today.  S/p cystoscopy, right retrograde pyelogram, right ureteral stent placement and TURP with Dr. Medrano on 11/12/24.  ROSITA 11/13/24 with mild improvement in hydronephrosis  S/p left nephroureteral stent placement with IR on 11/14/24  PLAN   Continue antibiotics.   Monitor platelets and risk of bleeding, no CBC today - added on.   Voiding trial in a few days as we continue to monitor renal function.  Will follow up outpatient with Dr. Medrano in 1 month to re-evaluate.     Dora Castaneda, APRN - CNP 11/15/2024

## 2024-11-15 NOTE — PROGRESS NOTES
[facility] Progress Note           Subjective/   69 y.o. year old male who we are seeing in consultation for TONG.     HPI:  - Renal function is slowly improving.  Intake adequate.  - Still c/o dizziness when he stands    ROS: No shortness of breath, abdominal pain, or back pain. +fever  Social Hx: no family present.      Objective/   GEN:  Chronically ill, /75   Pulse 72   Temp 98 °F (36.7 °C) (Oral)   Resp 16   Ht 1.829 m (6')   Wt 105.3 kg (232 lb 1.6 oz)   SpO2 99%   BMI 31.48 kg/m²   HEENT: non-icteric, no JVD  CV: S1, S2 without m/r/g; no LE edema  RESP: CTA B without w/r/r; breathing wnl  ABD: +bs, soft, nt, no hsm, +umbilical hernia, reducible   SKIN: warm, no rashes    Data/  Recent Labs     11/13/24  0856 11/14/24  0818   WBC 10.1 5.6   HGB 7.4* 7.2*   HCT 22.0* 21.6*   MCV 98.8 99.7   * 86*     Recent Labs     11/14/24  0501 11/14/24  0818 11/15/24  0500    141 136   K 3.6 3.6 3.7    105 105   CO2 19* 21 19*   GLUCOSE 128* 120* 114*   PHOS 2.9  --  3.8   BUN 57* 56* 51*   CREATININE 5.4* 5.3* 4.6*   LABGLOM 11* 11* 13*     UA bland, 100 glucose    CK 55    Renal US -> moderate bilateral hydronephrosis, small renal cysts, right kidney 12.9 cm, left kidney 14 cm    CT scan a/p wo contrast from 11/10/24  IMPRESSION:  Bladder wall thickening with internal soft tissue nodularity or dense  material (i.e.  Blood products).  Associated moderate right and severe  left-sided hydroureteronephrosis, which appears reflux related.  Complete  bladder evaluation is limited due to internal Rye catheter and lack of  contrast.  Urological follow-up is recommended for possible cystoscopy to  exclude bladder malignancy.    Lab Results   Component Value Date    IRON 99 11/10/2024    TIBC 193 (L) 11/10/2024    FERRITIN 425.0 (H) 11/10/2024   T sats 51%    Assessment/     - Acute kidney injury: urinary retention + renal hypoperfusion injury in setting of ARB use (with

## 2024-11-15 NOTE — PROGRESS NOTES
[facility] Medicine Progress Note  V 10.25      Date of Admission: 11/8/2024    Hospital Day: 8      Chief Admission Complaint:    Chief Complaint   Patient presents with    abnormal labs     Pt was seen by primary care, stated his BUN, and creatinine were elevated. Was told to come in.     Subjective:    Pt states he feels good today. Hgb dropped to 6.4, 1 unit of PRBCs ordered. Urine flowing well in both dennison catheter and left perc neph tube. Slight pink tinge to urine in left perc tube, dennison catheter drainage is clear.     Given thrombocytopenia and anemia, ordered haptoglobin, HIT screening, and LDH, levels pending.    Presenting Admission History:       69 y.o. male past medical history of hyperlipidemia, hypertension, BPH, obesity, macrocytic anemia, DAQUAN, prediabetes with recent A1c of 5.4 presents from PCP office with abnormal lab findings. About 3 months ago he started valsartan. Found to have hyperkalemia and elevated creatinine as outpatient. Pt was also taking NSAIDs and has difficulty with urination due to prostate hypertrophy. He has bilateral renal hydronephrosis. CT scan ordered. Transfused 1u pRBC. Urology and nephrology consulted.     Assessment/Plan:      Current Principal Problem:  TONG (acute kidney injury) (HCC)    TONG suspect 2/2 to obstructive uropathy along with renal hypoperfusion injury in setting of ARB and NSAID use. Bilateral hydronephrosis, urinary retention, BPH with obstruction noted.  - S/P cystoscopy with right ureteral stent placement, right retrograde pyelogram, and TURP with Dr. Medrano on 11/12/24  - S/P percutaneous left nephroureteral catheter placement on 11/14/24 per IR  - Continue dennison catheter per urology  - IV rocephin per urology  - IVFs per nephrology  - Valsartan currently on hold  - Per nephrology, patient is currently in post-obstructive polyuric phase and will need maintenance IVFs for a few more days    Anemia likely multifactorial due to renal failure and

## 2024-11-15 NOTE — PROGRESS NOTES
1836 - Spoke with Dr. Day from Urology regarding little to no output in dennison. He stated that it isn't abnormal for little to no output and as long as the patient isn't complaining of any abdominal pain or other symptoms then its okay. Patient made aware at this time.    Electronically signed by Smita Alcazar RN on 11/14/2024 at 7:07 PM

## 2024-11-15 NOTE — PROGRESS NOTES
Comprehensive Nutrition Assessment    Type and Reason for Visit:  Initial, LOS    Nutrition Recommendations/Plan:   Continue DON diet  Encourage nutrition  Monitor po intakes, nutrition adequacy, weights, pertinent labs, BMs     Malnutrition Assessment:  Malnutrition Status:  No malnutrition (11/15/24 1258)      Nutrition Assessment:    LOS assessment. Pt with PMHx of pre DM, diverticulosis, HTN, HLD, admitted with TONG. S/p left percutaneous nephroureteral tube placement on 11/14. Currently on a no added salt diet with po intakes % per EMR. Pt endorses good appetite now and PTA. Weight Hx stable. Pt reports some difficulty with ordering vegetarian meals. Provided pt with menu and offered suggestions. Will monitor.    Nutrition Related Findings:    +1 LLE edema. BM x1 on 11/12. Wound Type: None       Current Nutrition Intake & Therapies:    Average Meal Intake: %  Average Supplements Intake: None Ordered  ADULT DIET; Regular; No Added Salt (3-4 gm)    Anthropometric Measures:  Height: 182.9 cm (6')  Ideal Body Weight (IBW): 178 lbs (81 kg)       Current Body Weight: 105.3 kg (232 lb 1.6 oz),   IBW. Weight Source: Bed scale  Current BMI (kg/m2): 31.5                             BMI Categories: Obese Class 1 (BMI 30.0-34.9)    Nutrition Diagnosis:   No nutrition diagnosis at this time     Nutrition Interventions:   Food and/or Nutrient Delivery: Continue Current Diet  Nutrition Education/Counseling: No recommendation at this time  Coordination of Nutrition Care: Continue to monitor while inpatient       Goals:  Goals: PO intake 50% or greater, prior to discharge  Type of Goal: New goal  Previous Goal Met: New Goal    Nutrition Monitoring and Evaluation:   Behavioral-Environmental Outcomes: None Identified  Food/Nutrient Intake Outcomes: Food and Nutrient Intake  Physical Signs/Symptoms Outcomes: None Identified    Discharge Planning:    No discharge needs at this time, Continue current diet     Jerica HERNANDEZ

## 2024-11-16 LAB
ALBUMIN SERPL-MCNC: 3.1 G/DL (ref 3.4–5)
ANION GAP SERPL CALCULATED.3IONS-SCNC: 13 MMOL/L (ref 3–16)
BASOPHILS # BLD: 0 K/UL (ref 0–0.2)
BASOPHILS NFR BLD: 0.6 %
BUN SERPL-MCNC: 49 MG/DL (ref 7–20)
CALCIUM SERPL-MCNC: 8.1 MG/DL (ref 8.3–10.6)
CHLORIDE SERPL-SCNC: 107 MMOL/L (ref 99–110)
CO2 SERPL-SCNC: 18 MMOL/L (ref 21–32)
CREAT SERPL-MCNC: 4.1 MG/DL (ref 0.8–1.3)
DEPRECATED RDW RBC AUTO: 14.8 % (ref 12.4–15.4)
EOSINOPHIL # BLD: 0.2 K/UL (ref 0–0.6)
EOSINOPHIL NFR BLD: 4.2 %
GFR SERPLBLD CREATININE-BSD FMLA CKD-EPI: 15 ML/MIN/{1.73_M2}
GLUCOSE BLD-MCNC: 126 MG/DL (ref 70–99)
GLUCOSE BLD-MCNC: 130 MG/DL (ref 70–99)
GLUCOSE BLD-MCNC: 157 MG/DL (ref 70–99)
GLUCOSE BLD-MCNC: 159 MG/DL (ref 70–99)
GLUCOSE SERPL-MCNC: 117 MG/DL (ref 70–99)
HCT VFR BLD AUTO: 22.9 % (ref 40.5–52.5)
HEPARIN INDUCED PLATELET ANTIBODY: NEGATIVE
HGB BLD-MCNC: 7.6 G/DL (ref 13.5–17.5)
LYMPHOCYTES # BLD: 1 K/UL (ref 1–5.1)
LYMPHOCYTES NFR BLD: 21.2 %
MCH RBC QN AUTO: 32.8 PG (ref 26–34)
MCHC RBC AUTO-ENTMCNC: 33.7 G/DL (ref 31–36)
MCV RBC AUTO: 97.5 FL (ref 80–100)
MONOCYTES # BLD: 0.5 K/UL (ref 0–1.3)
MONOCYTES NFR BLD: 11.5 %
NEUTROPHILS # BLD: 3 K/UL (ref 1.7–7.7)
NEUTROPHILS NFR BLD: 62.5 %
PERFORMED ON: ABNORMAL
PHOSPHATE SERPL-MCNC: 3.6 MG/DL (ref 2.5–4.9)
PLATELET # BLD AUTO: 107 K/UL (ref 135–450)
PMV BLD AUTO: 9.4 FL (ref 5–10.5)
POTASSIUM SERPL-SCNC: 3.8 MMOL/L (ref 3.5–5.1)
RBC # BLD AUTO: 2.31 M/UL (ref 4.2–5.9)
SODIUM SERPL-SCNC: 138 MMOL/L (ref 136–145)
WBC # BLD AUTO: 4.7 K/UL (ref 4–11)

## 2024-11-16 PROCEDURE — 6370000000 HC RX 637 (ALT 250 FOR IP): Performed by: NURSE PRACTITIONER

## 2024-11-16 PROCEDURE — 2500000003 HC RX 250 WO HCPCS: Performed by: INTERNAL MEDICINE

## 2024-11-16 PROCEDURE — 36415 COLL VENOUS BLD VENIPUNCTURE: CPT

## 2024-11-16 PROCEDURE — 6370000000 HC RX 637 (ALT 250 FOR IP): Performed by: INTERNAL MEDICINE

## 2024-11-16 PROCEDURE — 80069 RENAL FUNCTION PANEL: CPT

## 2024-11-16 PROCEDURE — 2580000003 HC RX 258: Performed by: INTERNAL MEDICINE

## 2024-11-16 PROCEDURE — 85025 COMPLETE CBC W/AUTO DIFF WBC: CPT

## 2024-11-16 PROCEDURE — 1200000000 HC SEMI PRIVATE

## 2024-11-16 PROCEDURE — 6370000000 HC RX 637 (ALT 250 FOR IP): Performed by: UROLOGY

## 2024-11-16 PROCEDURE — 2580000003 HC RX 258: Performed by: UROLOGY

## 2024-11-16 PROCEDURE — 6360000002 HC RX W HCPCS: Performed by: UROLOGY

## 2024-11-16 RX ADMIN — AMLODIPINE BESYLATE 2.5 MG: 2.5 TABLET ORAL at 09:30

## 2024-11-16 RX ADMIN — Medication 2000 UNITS: at 09:30

## 2024-11-16 RX ADMIN — TAMSULOSIN HYDROCHLORIDE 0.4 MG: 0.4 CAPSULE ORAL at 09:30

## 2024-11-16 RX ADMIN — SODIUM CHLORIDE, POTASSIUM CHLORIDE, SODIUM LACTATE AND CALCIUM CHLORIDE: 600; 310; 30; 20 INJECTION, SOLUTION INTRAVENOUS at 11:19

## 2024-11-16 RX ADMIN — CEFTRIAXONE SODIUM 1000 MG: 1 INJECTION, POWDER, FOR SOLUTION INTRAMUSCULAR; INTRAVENOUS at 12:10

## 2024-11-16 RX ADMIN — Medication 10 MG: at 19:57

## 2024-11-16 RX ADMIN — SODIUM BICARBONATE: 84 INJECTION, SOLUTION INTRAVENOUS at 14:15

## 2024-11-16 ASSESSMENT — PAIN SCALES - GENERAL
PAINLEVEL_OUTOF10: 0
PAINLEVEL_OUTOF10: 0

## 2024-11-16 NOTE — PROGRESS NOTES
[facility] Progress Note           Subjective/   69 y.o. year old male who we are seeing in consultation for TONG.     HPI:  The patient was seen and examined; he feels well today with no CP, SOB, nausea or vomiting.    ROS: No fever or chills.  Social: Family at bedside.    Objective/   GEN:  Chronically ill, BP (!) 146/76   Pulse 76   Temp 97.7 °F (36.5 °C) (Oral)   Resp 14   Ht 1.829 m (6')   Wt 105.3 kg (232 lb 1.6 oz)   SpO2 98%   BMI 31.48 kg/m²     HEENT: non-icteric, no JVD  CV: S1, S2 without m/r/g; no LE edema  RESP: CTA B without w/r/r; breathing wnl  ABD: +bs, soft, nt, no hsm, +umbilical hernia, reducible   SKIN: warm, no rashes    Data/  Recent Labs     11/14/24  0818 11/15/24  0954 11/15/24  1822 11/16/24  0457   WBC 5.6 3.8*  --  4.7   HGB 7.2* 6.4* 7.4* 7.6*   HCT 21.6* 19.4* 22.1* 22.9*   MCV 99.7 99.5  --  97.5   PLT 86* 79*  --  107*     Recent Labs     11/14/24  0501 11/14/24  0818 11/15/24  0500 11/16/24  0457    141 136 138   K 3.6 3.6 3.7 3.8    105 105 107   CO2 19* 21 19* 18*   GLUCOSE 128* 120* 114* 117*   PHOS 2.9  --  3.8 3.6   BUN 57* 56* 51* 49*   CREATININE 5.4* 5.3* 4.6* 4.1*   LABGLOM 11* 11* 13* 15*     UA bland, 100 glucose    CK 55    Renal US -> moderate bilateral hydronephrosis, small renal cysts, right kidney 12.9 cm, left kidney 14 cm    CT scan a/p wo contrast from 11/10/24  IMPRESSION:  Bladder wall thickening with internal soft tissue nodularity or dense  material (i.e.  Blood products).  Associated moderate right and severe  left-sided hydroureteronephrosis, which appears reflux related.  Complete  bladder evaluation is limited due to internal Rey catheter and lack of  contrast.  Urological follow-up is recommended for possible cystoscopy to  exclude bladder malignancy.    Lab Results   Component Value Date    IRON 99 11/10/2024    TIBC 193 (L) 11/10/2024    FERRITIN 425.0 (H) 11/10/2024   T sats 51%    Assessment/     - Acute

## 2024-11-16 NOTE — ANESTHESIA POSTPROCEDURE EVALUATION
Department of Anesthesiology  Postprocedure Note    Patient: Tylor Cochran  MRN: [id number]  YOB: 1955  Date of evaluation: 11/15/2024    Procedure Summary       Date: 11/12/24 Room / Location: [address]    Anesthesia Start: 1650 Anesthesia Stop: 1850    Procedure: CYSTOSCOPY, RETROGRADE PYELOGRAM, RIGHT URETERAL STENT INSERTION, TRANURETERAL RESECTION OF PROSTATE (Bilateral: Bladder) Diagnosis:       Acute kidney injury (HCC)      Urinary retention      Bilateral hydronephrosis      (Acute kidney injury (HCC) [N17.9])      (Urinary retention [R33.9])      (Bilateral hydronephrosis [N13.30])    Surgeons: Neha Medrano MD Responsible Provider: Jarrod Tolentino MD    Anesthesia Type: general ASA Status: 3            Anesthesia Type: No value filed.    Claudia Phase I: Claudia Score: 10    Claudia Phase II:      Anesthesia Post Evaluation    Patient participation: complete - patient participated  Level of consciousness: awake and alert  Airway patency: patent  Nausea & Vomiting: no nausea and no vomiting  Cardiovascular status: blood pressure returned to baseline  Respiratory status: acceptable  Hydration status: euvolemic  Comments: VSS on transfer to phase 2 recovery.  No anesthetic complications. Continuing anemia, creatinine slightly improved.  Pain management: adequate      No notable events documented.

## 2024-11-16 NOTE — PROGRESS NOTES
Pt Name: Tylor Cochran  Medical Record Number: [id number]  Date of Birth 1955   Today's Date: 11/16/2024      Subjective: No  complaints    ROS: Constitutional: No fever    Vitals:  Vitals:    11/15/24 2036 11/15/24 2227 11/16/24 0316 11/16/24 0930   BP: 128/77 127/67 124/77 (!) 146/76   Pulse: 75 74 68 76   Resp: 16 14 14 14   Temp: 97.5 °F (36.4 °C) 98 °F (36.7 °C) 98.1 °F (36.7 °C) 97.7 °F (36.5 °C)   TempSrc: Oral Oral Oral Oral   SpO2: 97% 97% 98% 98%   Weight:       Height:         I/O last 3 completed shifts:  In: 1197.3 [P.O.:840; Blood:357.3]  Out: 17418 [Urine:68257]    Exam:  General: Awake, oriented, no acute distress  Respiratory: Nonlabored breathing  Abdomen: Soft, non-tender, non-distended, no masses  : dennison catheter intact with clear/yellow output, nephrostomy drain intact with mostly clear/slight pink tinge output  Skin: Skin color, texture, turgor normal, no rashes or lesions  Neurologic: no gross deficits       CURRENT MEDICATIONS   Scheduled Meds:   amLODIPine  2.5 mg Oral Daily    insulin lispro  0-4 Units SubCUTAneous 4x Daily AC & HS    Vitamin D  2,000 Units Oral Daily    cefTRIAXone (ROCEPHIN) IV  1,000 mg IntraVENous Q24H    tamsulosin  0.4 mg Oral Daily    [Held by provider] cetirizine  10 mg Oral Daily    melatonin  10 mg Oral Nightly    sodium chloride flush  5-40 mL IntraVENous 2 times per day    [Held by provider] heparin (porcine)  5,000 Units SubCUTAneous 3 times per day     Continuous Infusions:   lactated ringers 100 mL/hr at 11/15/24 1644    sodium chloride      sodium chloride      dextrose      sodium chloride 10 mL/hr at 11/15/24 1204     PRN Meds:.sodium chloride, sodium chloride, cloNIDine, sodium chloride, glucose, dextrose bolus **OR** dextrose bolus, glucagon (rDNA), dextrose, sodium chloride flush, sodium chloride, ondansetron **OR** ondansetron, polyethylene glycol, acetaminophen **OR** acetaminophen    LABS     Recent Labs     11/14/24  1366

## 2024-11-16 NOTE — PROGRESS NOTES
[facility] Medicine Progress Note  V 10.25      Date of Admission: 11/8/2024    Hospital Day: 9      Chief Admission Complaint:    Chief Complaint   Patient presents with    abnormal labs     Pt was seen by primary care, stated his BUN, and creatinine were elevated. Was told to come in.     Subjective:    Pt doing well today, denies any issues. Urine clear in both nephrostomy tube and dennison catheter.     Presenting Admission History:       69 y.o. male past medical history of hyperlipidemia, hypertension, BPH, obesity, macrocytic anemia, DAQUAN, prediabetes with recent A1c of 5.4 presents from PCP office with abnormal lab findings. About 3 months ago he started valsartan. Found to have hyperkalemia and elevated creatinine as outpatient. Pt was also taking NSAIDs and has difficulty with urination due to prostate hypertrophy. He has bilateral renal hydronephrosis. CT scan ordered. Transfused 1u pRBC. Urology and nephrology consulted.     Assessment/Plan:      Current Principal Problem:  TONG (acute kidney injury) (HCC)    TONG suspect 2/2 to obstructive uropathy along with renal hypoperfusion injury in setting of ARB and NSAID use. Bilateral hydronephrosis, urinary retention, BPH with obstruction noted.  - S/P cystoscopy with right ureteral stent placement, right retrograde pyelogram, and TURP with Dr. Medrano on 11/12/24  - S/P percutaneous left nephroureteral catheter placement on 11/14/24 per IR  - Continue dennison catheter per urology  - IV rocephin per urology  - IVFs per nephrology  - Valsartan currently on hold  - Per nephrology, patient is currently in post-obstructive polyuric phase and will need maintenance IVFs for a few more days    Anemia likely multifactorial due to renal failure and hematuria  - Monitor H/H  - Transfuse if Hgb <7  - Tele monitoring     Metabolic acidosis -secondary to TONG improving - continue bicarb drip, nephrology is following    Hyperkalemia -  resolved     HTN - started on amlodipine- no more  Resp 14   Ht 1.829 m (6')   Wt 105.3 kg (232 lb 1.6 oz)   SpO2 98%   BMI 31.48 kg/m²     Telemetry:      Personally reviewed and interpreted telemetry (Rhythm Strip) on 11/16/2024 with the following findings: NSR    Diet: ADULT DIET; Regular; No Added Salt (3-4 gm)    DVT Prophylaxis: []PPx LMWH  []SQ Heparin  [x]IPC/SCDs  []Eliquis  []Xarelto  []Coumadin  [] Heparin Drip  []Other -      Code status: Full Code    PT/OT Eval Status:   [x]NOT yet ordered  []Ordered and Pending   []Seen with Recommendations for:   []Home independently  []Home w/ assist  []HHC  []SNF  []Acute Rehab    Multi-Disciplinary Rounds with Case Management completed on 11/16/2024 with the following recommendations:  Anticipated Discharge Location: [x]Home w/ []HHC vs []SNF  []Acute Rehab  []LTAC  []Hospice  []Other -    Anticipated Discharge Day/Date:  1-2 DAYS?  Barriers to Discharge: needs blood transfusion  --------------------------------------------------    MDM (any 2 required for High level billing)    A. Problems (any 1)  [x] Acute/Chronic Illness/injury posing ongoing threat to life and/or bodily function without ongoing treatment    [] Severe exacerbation of chronic illness    --------------------------------------------------  B. Risk of Treatment (any 1)    [x] Drugs/treatments that require intensive monitoring for toxicity    [] IV ABX (Vancomycin, Aminoglycosides, etc)     [] Post-Cath/Contrast study requiring serial monitoring    [] IV Narcotic analgesia    [] Aggressive IV diuresis    [] Hypertonic Saline    [] Critical electrolyte abnormalities requiring IV replacement    [] Insulin - Scheduled/SSI or Insulin gtt    [] Anticoagulation (Heparin gtt or Coumadin - other anticoagulants in special circumstances)    [] Cardiac Medications (IV Amiodarone/Diltiazem, Tikosyn, etc)    [] Hemodialysis    [x] Other -  PRBC infusion  [] Change in code status    [] Decision to escalate care    [] Major surgery/procedure with associated

## 2024-11-17 LAB
ALBUMIN SERPL-MCNC: 3.4 G/DL (ref 3.4–5)
ANION GAP SERPL CALCULATED.3IONS-SCNC: 14 MMOL/L (ref 3–16)
BASOPHILS # BLD: 0 K/UL (ref 0–0.2)
BASOPHILS NFR BLD: 0.5 %
BUN SERPL-MCNC: 44 MG/DL (ref 7–20)
CALCIUM SERPL-MCNC: 8.5 MG/DL (ref 8.3–10.6)
CHLORIDE SERPL-SCNC: 104 MMOL/L (ref 99–110)
CO2 SERPL-SCNC: 19 MMOL/L (ref 21–32)
CREAT SERPL-MCNC: 3.6 MG/DL (ref 0.8–1.3)
DEPRECATED RDW RBC AUTO: 14.7 % (ref 12.4–15.4)
EOSINOPHIL # BLD: 0.3 K/UL (ref 0–0.6)
EOSINOPHIL NFR BLD: 5.3 %
GFR SERPLBLD CREATININE-BSD FMLA CKD-EPI: 17 ML/MIN/{1.73_M2}
GLUCOSE BLD-MCNC: 105 MG/DL (ref 70–99)
GLUCOSE BLD-MCNC: 106 MG/DL (ref 70–99)
GLUCOSE BLD-MCNC: 135 MG/DL (ref 70–99)
GLUCOSE BLD-MCNC: 136 MG/DL (ref 70–99)
GLUCOSE SERPL-MCNC: 115 MG/DL (ref 70–99)
HAPTOGLOB SERPL-MCNC: 215 MG/DL (ref 30–200)
HCT VFR BLD AUTO: 24.2 % (ref 40.5–52.5)
HGB BLD-MCNC: 8.1 G/DL (ref 13.5–17.5)
LYMPHOCYTES # BLD: 0.9 K/UL (ref 1–5.1)
LYMPHOCYTES NFR BLD: 17.9 %
MCH RBC QN AUTO: 32.6 PG (ref 26–34)
MCHC RBC AUTO-ENTMCNC: 33.3 G/DL (ref 31–36)
MCV RBC AUTO: 97.9 FL (ref 80–100)
MONOCYTES # BLD: 0.6 K/UL (ref 0–1.3)
MONOCYTES NFR BLD: 10.8 %
NEUTROPHILS # BLD: 3.5 K/UL (ref 1.7–7.7)
NEUTROPHILS NFR BLD: 65.5 %
PERFORMED ON: ABNORMAL
PHOSPHATE SERPL-MCNC: 3.2 MG/DL (ref 2.5–4.9)
PLATELET # BLD AUTO: 151 K/UL (ref 135–450)
PMV BLD AUTO: 8.4 FL (ref 5–10.5)
POTASSIUM SERPL-SCNC: 4.2 MMOL/L (ref 3.5–5.1)
RBC # BLD AUTO: 2.47 M/UL (ref 4.2–5.9)
SODIUM SERPL-SCNC: 137 MMOL/L (ref 136–145)
WBC # BLD AUTO: 5.3 K/UL (ref 4–11)

## 2024-11-17 PROCEDURE — 6360000002 HC RX W HCPCS: Performed by: UROLOGY

## 2024-11-17 PROCEDURE — 6370000000 HC RX 637 (ALT 250 FOR IP): Performed by: INTERNAL MEDICINE

## 2024-11-17 PROCEDURE — 1200000000 HC SEMI PRIVATE

## 2024-11-17 PROCEDURE — 2580000003 HC RX 258: Performed by: UROLOGY

## 2024-11-17 PROCEDURE — 85025 COMPLETE CBC W/AUTO DIFF WBC: CPT

## 2024-11-17 PROCEDURE — 2500000003 HC RX 250 WO HCPCS: Performed by: INTERNAL MEDICINE

## 2024-11-17 PROCEDURE — 6370000000 HC RX 637 (ALT 250 FOR IP): Performed by: UROLOGY

## 2024-11-17 PROCEDURE — 2580000003 HC RX 258: Performed by: INTERNAL MEDICINE

## 2024-11-17 PROCEDURE — 36415 COLL VENOUS BLD VENIPUNCTURE: CPT

## 2024-11-17 PROCEDURE — 87040 BLOOD CULTURE FOR BACTERIA: CPT

## 2024-11-17 PROCEDURE — 80069 RENAL FUNCTION PANEL: CPT

## 2024-11-17 PROCEDURE — 6370000000 HC RX 637 (ALT 250 FOR IP): Performed by: NURSE PRACTITIONER

## 2024-11-17 RX ADMIN — SODIUM BICARBONATE: 84 INJECTION, SOLUTION INTRAVENOUS at 03:31

## 2024-11-17 RX ADMIN — CEFTRIAXONE SODIUM 1000 MG: 1 INJECTION, POWDER, FOR SOLUTION INTRAMUSCULAR; INTRAVENOUS at 12:11

## 2024-11-17 RX ADMIN — AMLODIPINE BESYLATE 2.5 MG: 2.5 TABLET ORAL at 08:10

## 2024-11-17 RX ADMIN — Medication 10 MG: at 20:07

## 2024-11-17 RX ADMIN — TAMSULOSIN HYDROCHLORIDE 0.4 MG: 0.4 CAPSULE ORAL at 08:10

## 2024-11-17 RX ADMIN — Medication 2000 UNITS: at 08:10

## 2024-11-17 RX ADMIN — SODIUM BICARBONATE: 84 INJECTION, SOLUTION INTRAVENOUS at 20:08

## 2024-11-17 ASSESSMENT — PAIN SCALES - GENERAL: PAINLEVEL_OUTOF10: 0

## 2024-11-17 NOTE — PROGRESS NOTES
[facility] Progress Note           Subjective/   69 y.o. year old male who we are seeing in consultation for TONG.     HPI:  The patient was seen and examined; he feels well today with no CP, SOB, nausea or vomiting.    ROS: No fever or chills.  Social: No family at bedside.    Objective/   GEN:  Chronically ill, BP (!) 154/69   Pulse 78   Temp 97.9 °F (36.6 °C) (Oral)   Resp 16   Ht 1.829 m (6')   Wt 105.3 kg (232 lb 1.6 oz)   SpO2 100%   BMI 31.48 kg/m²     HEENT: non-icteric, no JVD  CV: S1, S2 without m/r/g; no LE edema  RESP: CTA B without w/r/r; breathing wnl  ABD: +bs, soft, nt, no hsm, +umbilical hernia, reducible   SKIN: warm, no rashes    Data/  Recent Labs     11/15/24  0954 11/15/24  1822 11/16/24  0457 11/17/24  0707   WBC 3.8*  --  4.7 5.3   HGB 6.4* 7.4* 7.6* 8.1*   HCT 19.4* 22.1* 22.9* 24.2*   MCV 99.5  --  97.5 97.9   PLT 79*  --  107* 151     Recent Labs     11/15/24  0500 11/16/24  0457 11/17/24  0707    138 137   K 3.7 3.8 4.2    107 104   CO2 19* 18* 19*   GLUCOSE 114* 117* 115*   PHOS 3.8 3.6 3.2   BUN 51* 49* 44*   CREATININE 4.6* 4.1* 3.6*   LABGLOM 13* 15* 17*     UA bland, 100 glucose    CK 55    Renal US -> moderate bilateral hydronephrosis, small renal cysts, right kidney 12.9 cm, left kidney 14 cm    CT scan a/p wo contrast from 11/10/24  IMPRESSION:  Bladder wall thickening with internal soft tissue nodularity or dense  material (i.e.  Blood products).  Associated moderate right and severe  left-sided hydroureteronephrosis, which appears reflux related.  Complete  bladder evaluation is limited due to internal Rey catheter and lack of  contrast.  Urological follow-up is recommended for possible cystoscopy to  exclude bladder malignancy.    Lab Results   Component Value Date    IRON 99 11/10/2024    TIBC 193 (L) 11/10/2024    FERRITIN 425.0 (H) 11/10/2024   T sats 51%    Assessment/     - Acute kidney injury: urinary retention + renal  hypoperfusion injury in setting of ARB use (with NSAID use), and suspected obstructive issues playing primary role here.  Given that there are no recent labs; the chronicity of all this is unclear.  High PTH (219) implies that this is partially chronic.    - SCr as high as 8.8 on admission, prior 1.4 from May 2023, non-oliguric   - Urology following and s/p cysto w TURP.    - PSA 0.53   - 11/12/24: s/p IVF bolus for orthostasis.    - SPEP: possible band.  Needs UPEP later.  He remains in post-obstructive polyuric phase, needs maintenance IVF for a few more days likely, until UOP slows.  Encouraged PO fluids intake.  Renal function is slowly improving.      - Hyperkalemia: due to TONG with ARB/NSAID use.   - better with fluids, lokelma.     - Polyuria: he remains in post-obstructive polyuric phase.  - Needs IVF for a few more days likely.    - Anion-gap metabolic acidosis:   - S/p IVF with Bicarb: stopped on 11/14.      - Hypertension:   - BP was somewhat elevated.    - Losartan is held.   - Norvasc 5mg daily was added --> then lowered to 2.5mg daily.      - Anemia: macrocytic, states bone marrow biopsy ~2 years ago was unrevealing.  - S/p 1 unit prbc 11/10 and 11/15.  - Vit B12 and Folate wnl.   - May need EPO if ongoing.     - Hypocalcemia: Vit D was low (20).   - S/p some IV Calcium.     - Hydronephrosis / BPH with Obstruction: s/p TURP on 11/12.    - 11/13: Renal US shows only light improvement in mild-to-moderate bilateral  hydronephrosis.    - 11/14: IR placed Left percutaneous Nephroureteral Tube.    - Planned to convert to internal stent on Monday and voiding trial, per Urology.    Plan/   - Trending labs.     - Continue maintenance IVF's     - Monitoring off of valsartan from admission.     ____________________________________  Paige Francis MD  [facility]  [email or web address]  Office: [phone]

## 2024-11-17 NOTE — PROGRESS NOTES
Pt Name: Tylor Cochran  Medical Record Number: [id number]  Date of Birth 1955   Today's Date: 11/17/2024      Subjective: No  complaints    ROS: Constitutional: No fever    Vitals:  Vitals:    11/16/24 1952 11/16/24 2308 11/17/24 0324 11/17/24 0803   BP: (!) 122/58 133/76 119/71 (!) 154/69   Pulse: 77  79 78   Resp: 14 16 17 16   Temp: 97.9 °F (36.6 °C) 97.9 °F (36.6 °C) 97.6 °F (36.4 °C) 97.9 °F (36.6 °C)   TempSrc: Oral Oral Oral Oral   SpO2: 98% 99% 98% 100%   Weight:       Height:         I/O last 3 completed shifts:  In: 1440 [P.O.:1440]  Out: 9570 [Urine:9570]    Exam:  General: Awake, oriented, no acute distress  Respiratory: Nonlabored breathing  Abdomen: Soft, non-tender, non-distended, no masses  : dennison catheter intact with clear/yellow output, nephrostomy drain intact with mostly clear/slight pink tinge output  Skin: Skin color, texture, turgor normal, no rashes or lesions  Neurologic: no gross deficits       CURRENT MEDICATIONS   Scheduled Meds:   amLODIPine  2.5 mg Oral Daily    insulin lispro  0-4 Units SubCUTAneous 4x Daily AC & HS    Vitamin D  2,000 Units Oral Daily    cefTRIAXone (ROCEPHIN) IV  1,000 mg IntraVENous Q24H    tamsulosin  0.4 mg Oral Daily    [Held by provider] cetirizine  10 mg Oral Daily    melatonin  10 mg Oral Nightly    sodium chloride flush  5-40 mL IntraVENous 2 times per day    [Held by provider] heparin (porcine)  5,000 Units SubCUTAneous 3 times per day     Continuous Infusions:   sodium bicarbonate 75 mEq in sodium chloride 0.45 % 1,000 mL infusion 75 mL/hr at 11/17/24 0331    sodium chloride      sodium chloride      dextrose      sodium chloride 10 mL/hr at 11/15/24 1204     PRN Meds:.sodium chloride, sodium chloride, cloNIDine, sodium chloride, glucose, dextrose bolus **OR** dextrose bolus, glucagon (rDNA), dextrose, sodium chloride flush, sodium chloride, ondansetron **OR** ondansetron, polyethylene glycol, acetaminophen **OR** acetaminophen    LABS

## 2024-11-17 NOTE — PLAN OF CARE
Problem: Chronic Conditions and Co-morbidities  Goal: Patient's chronic conditions and co-morbidity symptoms are monitored and maintained or improved  11/16/2024 2209 by Laura Sandoval RN  Outcome: Progressing     Problem: Discharge Planning  Goal: Discharge to home or other facility with appropriate resources  11/16/2024 2209 by Laura Sandoval RN  Outcome: Progressing     Problem: Safety - Adult  Goal: Free from fall injury  11/16/2024 2209 by Laura Sandoval RN  Outcome: Progressing     Problem: Neurosensory - Adult  Goal: Achieves stable or improved neurological status  Outcome: Progressing     Problem: Skin/Tissue Integrity - Adult  Goal: Skin integrity remains intact  Outcome: Progressing     Problem: Musculoskeletal - Adult  Goal: Return mobility to safest level of function  Outcome: Progressing     Problem: Genitourinary - Adult  Goal: Absence of urinary retention  Outcome: Progressing     Problem: Genitourinary - Adult  Goal: Urinary catheter remains patent  Outcome: Progressing     Problem: Metabolic/Fluid and Electrolytes - Adult  Goal: Electrolytes maintained within normal limits  Outcome: Progressing     Problem: Metabolic/Fluid and Electrolytes - Adult  Goal: Hemodynamic stability and optimal renal function maintained  Outcome: Progressing     Problem: Metabolic/Fluid and Electrolytes - Adult  Goal: Glucose maintained within prescribed range  Outcome: Progressing     Problem: Hematologic - Adult  Goal: Maintains hematologic stability  Outcome: Progressing     Problem: Pain  Goal: Verbalizes/displays adequate comfort level or baseline comfort level  11/16/2024 2209 by Laura Sandoval RN  Outcome: Adequate for Discharge

## 2024-11-17 NOTE — PROGRESS NOTES
[facility] Medicine Progress Note  V 10.25      Date of Admission: 11/8/2024    Hospital Day: 10      Chief Admission Complaint:    Chief Complaint   Patient presents with    abnormal labs     Pt was seen by primary care, stated his BUN, and creatinine were elevated. Was told to come in.     Subjective:      Pt reports doing well today. Denies any other issues. Urine is clear in both dennison catheter and nephrostomy.    Presenting Admission History:       69 y.o. male past medical history of hyperlipidemia, hypertension, BPH, obesity, macrocytic anemia, DAQUAN, prediabetes with recent A1c of 5.4 presents from PCP office with abnormal lab findings. About 3 months ago he started valsartan. Found to have hyperkalemia and elevated creatinine as outpatient. Pt was also taking NSAIDs and has difficulty with urination due to prostate hypertrophy. He has bilateral renal hydronephrosis. CT scan ordered. Transfused 1u pRBC. Urology and nephrology consulted.     Assessment/Plan:      Current Principal Problem:  TONG (acute kidney injury) (HCC)    TONG suspect 2/2 to obstructive uropathy along with renal hypoperfusion injury in setting of ARB and NSAID use. Bilateral hydronephrosis, urinary retention, BPH with obstruction noted.  - S/P cystoscopy with right ureteral stent placement, right retrograde pyelogram, and TURP with Dr. Medrano on 11/12/24  - S/P percutaneous left nephroureteral catheter placement on 11/14/24 per IR  - Continue dennison catheter per urology  - IV rocephin per urology  - IVFs per nephrology  - Valsartan currently on hold  - Per nephrology, patient is currently in post-obstructive polyuric phase and will need maintenance IVFs for a few more days    Anemia likely multifactorial due to renal failure and hematuria  - Monitor H/H  - Transfuse if Hgb <7  - Tele monitoring     Metabolic acidosis -secondary to TONG improving - continue bicarb drip, nephrology is following    Hyperkalemia -  resolved     HTN - started on  oriented    BP (!) 154/69   Pulse 78   Temp 97.9 °F (36.6 °C) (Oral)   Resp 16   Ht 1.829 m (6')   Wt 105.3 kg (232 lb 1.6 oz)   SpO2 100%   BMI 31.48 kg/m²     Telemetry:      Personally reviewed and interpreted telemetry (Rhythm Strip) on 11/17/2024 with the following findings: NSR    Diet: ADULT DIET; Regular; No Added Salt (3-4 gm)    DVT Prophylaxis: []PPx LMWH  []SQ Heparin  [x]IPC/SCDs  []Eliquis  []Xarelto  []Coumadin  [] Heparin Drip  []Other -      Code status: Full Code    PT/OT Eval Status:   [x]NOT yet ordered  []Ordered and Pending   []Seen with Recommendations for:   []Home independently  []Home w/ assist  []HHC  []SNF  []Acute Rehab    Multi-Disciplinary Rounds with Case Management completed on 11/17/2024 with the following recommendations:  Anticipated Discharge Location: [x]Home w/ []HHC vs []SNF  []Acute Rehab  []LTAC  []Hospice  []Other -    Anticipated Discharge Day/Date: 2-3 days  Barriers to Discharge: needs blood transfusion  --------------------------------------------------    MDM (any 2 required for High level billing)    A. Problems (any 1)  [x] Acute/Chronic Illness/injury posing ongoing threat to life and/or bodily function without ongoing treatment    [] Severe exacerbation of chronic illness    --------------------------------------------------  B. Risk of Treatment (any 1)    [x] Drugs/treatments that require intensive monitoring for toxicity    [] IV ABX (Vancomycin, Aminoglycosides, etc)     [] Post-Cath/Contrast study requiring serial monitoring    [] IV Narcotic analgesia    [] Aggressive IV diuresis    [] Hypertonic Saline    [] Critical electrolyte abnormalities requiring IV replacement    [] Insulin - Scheduled/SSI or Insulin gtt    [] Anticoagulation (Heparin gtt or Coumadin - other anticoagulants in special circumstances)    [] Cardiac Medications (IV Amiodarone/Diltiazem, Tikosyn, etc)    [] Hemodialysis    [x] Other -  PRBC infusion  [] Change in code status    []

## 2024-11-18 ENCOUNTER — APPOINTMENT (OUTPATIENT)
Dept: INTERVENTIONAL RADIOLOGY/VASCULAR | Age: 69
DRG: 660 | End: 2024-11-18
Payer: MEDICARE

## 2024-11-18 LAB
ALBUMIN SERPL-MCNC: 3.2 G/DL (ref 3.4–5)
ANION GAP SERPL CALCULATED.3IONS-SCNC: 12 MMOL/L (ref 3–16)
BACTERIA BLD CULT ORG #2: ABNORMAL
BACTERIA BLD CULT ORG #2: ABNORMAL
BACTERIA BLD CULT: NORMAL
BASOPHILS # BLD: 0 K/UL (ref 0–0.2)
BASOPHILS NFR BLD: 0.6 %
BUN SERPL-MCNC: 47 MG/DL (ref 7–20)
CALCIUM SERPL-MCNC: 8.5 MG/DL (ref 8.3–10.6)
CHLORIDE SERPL-SCNC: 108 MMOL/L (ref 99–110)
CO2 SERPL-SCNC: 18 MMOL/L (ref 21–32)
CREAT SERPL-MCNC: 3.5 MG/DL (ref 0.8–1.3)
DEPRECATED RDW RBC AUTO: 14.8 % (ref 12.4–15.4)
EOSINOPHIL # BLD: 0.3 K/UL (ref 0–0.6)
EOSINOPHIL NFR BLD: 6 %
GFR SERPLBLD CREATININE-BSD FMLA CKD-EPI: 18 ML/MIN/{1.73_M2}
GLUCOSE BLD-MCNC: 120 MG/DL (ref 70–99)
GLUCOSE BLD-MCNC: 122 MG/DL (ref 70–99)
GLUCOSE BLD-MCNC: 215 MG/DL (ref 70–99)
GLUCOSE BLD-MCNC: 98 MG/DL (ref 70–99)
GLUCOSE SERPL-MCNC: 128 MG/DL (ref 70–99)
HCT VFR BLD AUTO: 21.2 % (ref 40.5–52.5)
HGB BLD-MCNC: 7.2 G/DL (ref 13.5–17.5)
LYMPHOCYTES # BLD: 0.9 K/UL (ref 1–5.1)
LYMPHOCYTES NFR BLD: 17.4 %
MCH RBC QN AUTO: 33.3 PG (ref 26–34)
MCHC RBC AUTO-ENTMCNC: 34 G/DL (ref 31–36)
MCV RBC AUTO: 98 FL (ref 80–100)
MONOCYTES # BLD: 0.5 K/UL (ref 0–1.3)
MONOCYTES NFR BLD: 10 %
NEUTROPHILS # BLD: 3.4 K/UL (ref 1.7–7.7)
NEUTROPHILS NFR BLD: 66 %
ORGANISM: ABNORMAL
PERFORMED ON: ABNORMAL
PERFORMED ON: NORMAL
PHOSPHATE SERPL-MCNC: 4.1 MG/DL (ref 2.5–4.9)
PLATELET # BLD AUTO: 173 K/UL (ref 135–450)
PMV BLD AUTO: 7.6 FL (ref 5–10.5)
POTASSIUM SERPL-SCNC: 4.4 MMOL/L (ref 3.5–5.1)
RBC # BLD AUTO: 2.16 M/UL (ref 4.2–5.9)
SODIUM SERPL-SCNC: 138 MMOL/L (ref 136–145)
WBC # BLD AUTO: 5.2 K/UL (ref 4–11)

## 2024-11-18 PROCEDURE — 2580000003 HC RX 258: Performed by: UROLOGY

## 2024-11-18 PROCEDURE — 99152 MOD SED SAME PHYS/QHP 5/>YRS: CPT

## 2024-11-18 PROCEDURE — 6360000002 HC RX W HCPCS: Performed by: RADIOLOGY

## 2024-11-18 PROCEDURE — 51702 INSERT TEMP BLADDER CATH: CPT

## 2024-11-18 PROCEDURE — 6360000004 HC RX CONTRAST MEDICATION: Performed by: RADIOLOGY

## 2024-11-18 PROCEDURE — 1200000000 HC SEMI PRIVATE

## 2024-11-18 PROCEDURE — 80069 RENAL FUNCTION PANEL: CPT

## 2024-11-18 PROCEDURE — 6370000000 HC RX 637 (ALT 250 FOR IP): Performed by: NURSE PRACTITIONER

## 2024-11-18 PROCEDURE — 85025 COMPLETE CBC W/AUTO DIFF WBC: CPT

## 2024-11-18 PROCEDURE — 6360000002 HC RX W HCPCS: Performed by: UROLOGY

## 2024-11-18 PROCEDURE — 0T773DZ DILATION OF LEFT URETER WITH INTRALUMINAL DEVICE, PERCUTANEOUS APPROACH: ICD-10-PCS | Performed by: RADIOLOGY

## 2024-11-18 PROCEDURE — 50693 PLMT URETERAL STENT PRQ: CPT

## 2024-11-18 PROCEDURE — 2709999900 IR GUIDED NEPHROSTOMY CATH EXCHANGE

## 2024-11-18 PROCEDURE — 6370000000 HC RX 637 (ALT 250 FOR IP)

## 2024-11-18 PROCEDURE — 2500000003 HC RX 250 WO HCPCS: Performed by: RADIOLOGY

## 2024-11-18 PROCEDURE — 36415 COLL VENOUS BLD VENIPUNCTURE: CPT

## 2024-11-18 RX ORDER — FENTANYL CITRATE 50 UG/ML
INJECTION, SOLUTION INTRAMUSCULAR; INTRAVENOUS PRN
Status: COMPLETED | OUTPATIENT
Start: 2024-11-18 | End: 2024-11-18

## 2024-11-18 RX ORDER — LIDOCAINE HYDROCHLORIDE 10 MG/ML
INJECTION, SOLUTION EPIDURAL; INFILTRATION; INTRACAUDAL; PERINEURAL PRN
Status: COMPLETED | OUTPATIENT
Start: 2024-11-18 | End: 2024-11-18

## 2024-11-18 RX ORDER — AMLODIPINE BESYLATE 5 MG/1
5 TABLET ORAL DAILY
Status: DISCONTINUED | OUTPATIENT
Start: 2024-11-18 | End: 2024-11-19 | Stop reason: HOSPADM

## 2024-11-18 RX ORDER — SODIUM BICARBONATE 650 MG/1
650 TABLET ORAL 2 TIMES DAILY
Status: DISCONTINUED | OUTPATIENT
Start: 2024-11-18 | End: 2024-11-19

## 2024-11-18 RX ORDER — MIDAZOLAM HYDROCHLORIDE 1 MG/ML
INJECTION, SOLUTION INTRAMUSCULAR; INTRAVENOUS PRN
Status: COMPLETED | OUTPATIENT
Start: 2024-11-18 | End: 2024-11-18

## 2024-11-18 RX ADMIN — MIDAZOLAM 0.5 MG: 1 INJECTION INTRAMUSCULAR; INTRAVENOUS at 13:56

## 2024-11-18 RX ADMIN — MIDAZOLAM 0.5 MG: 1 INJECTION INTRAMUSCULAR; INTRAVENOUS at 14:03

## 2024-11-18 RX ADMIN — SODIUM BICARBONATE 650 MG: 650 TABLET ORAL at 21:52

## 2024-11-18 RX ADMIN — LIDOCAINE HYDROCHLORIDE 2 ML: 10 INJECTION, SOLUTION EPIDURAL; INFILTRATION; INTRACAUDAL; PERINEURAL at 14:16

## 2024-11-18 RX ADMIN — IOVERSOL 25 ML: 741 INJECTION INTRA-ARTERIAL; INTRAVENOUS at 14:16

## 2024-11-18 RX ADMIN — MIDAZOLAM 1 MG: 1 INJECTION INTRAMUSCULAR; INTRAVENOUS at 13:51

## 2024-11-18 RX ADMIN — Medication 10 MG: at 21:52

## 2024-11-18 RX ADMIN — FENTANYL CITRATE 50 MCG: 50 INJECTION, SOLUTION INTRAMUSCULAR; INTRAVENOUS at 13:51

## 2024-11-18 RX ADMIN — FENTANYL CITRATE 25 MCG: 50 INJECTION, SOLUTION INTRAMUSCULAR; INTRAVENOUS at 13:56

## 2024-11-18 RX ADMIN — CEFTRIAXONE SODIUM 1000 MG: 1 INJECTION, POWDER, FOR SOLUTION INTRAMUSCULAR; INTRAVENOUS at 12:43

## 2024-11-18 RX ADMIN — Medication 10 ML: at 21:54

## 2024-11-18 RX ADMIN — FENTANYL CITRATE 25 MCG: 50 INJECTION, SOLUTION INTRAMUSCULAR; INTRAVENOUS at 14:03

## 2024-11-18 NOTE — PROGRESS NOTES
ID    Consult request received, chart reviewed   Patient is off the floor for procedure     -continue rocephin   -formal consult to follow tomorrow    KALI THOMAS MD

## 2024-11-18 NOTE — BRIEF OP NOTE
Brief Postoperative Note      Patient: Tylor Cochran  YOB: 1955  MRN: [id number]    Date of Procedure: 11/18/2024    Antegrade Double J Stent Placement      Surgeon(s):  Nery Kwon MD      Anesthesia: Moderate Sedation    Estimated Blood Loss (mL): Minimal    Complications: None      Implants:  8fr x 24 cm double JJ        Findings:  Successful left 8 fr x 24 cm antegrade double J stent placement.  All wires and external catheters removed.  Routine exchanges per Urology.    Electronically signed by Nery Kwon MD on 11/18/2024 at 2:29 PM

## 2024-11-18 NOTE — PLAN OF CARE
Problem: Chronic Conditions and Co-morbidities  Goal: Patient's chronic conditions and co-morbidity symptoms are monitored and maintained or improved  11/18/2024 1131 by Risa Muller RN  Outcome: Progressing     Problem: Discharge Planning  Goal: Discharge to home or other facility with appropriate resources  11/18/2024 1131 by Risa Muller RN  Outcome: Progressing     Problem: Safety - Adult  Goal: Free from fall injury  11/18/2024 1131 by Risa Muller RN  Outcome: Progressing

## 2024-11-18 NOTE — PROGRESS NOTES
[facility] Progress Note           Subjective/   69 y.o. year old male who we are seeing in consultation for TONG.     HPI:  Resting  Has been NPO for possible internalization of the nephrostomy     ROS: No fever or chills.  Social: No family at bedside.    Objective/   GEN:  Chronically ill, /76   Pulse 81   Temp 97.8 °F (36.6 °C) (Oral)   Resp 17   Ht 1.829 m (6')   Wt 105.3 kg (232 lb 1.6 oz)   SpO2 100%   BMI 31.48 kg/m²     HEENT: non-icteric, no JVD  CV: S1, S2 without m/r/g; no LE edema  RESP: CTA B without w/r/r; breathing wnl  ABD: +bs, soft, nt, no hsm, +umbilical hernia, reducible   SKIN: warm, no rashes    Data/  Recent Labs     11/16/24 0457 11/17/24  0707 11/18/24  0556   WBC 4.7 5.3 5.2   HGB 7.6* 8.1* 7.2*   HCT 22.9* 24.2* 21.2*   MCV 97.5 97.9 98.0   * 151 173     Recent Labs     11/16/24 0457 11/17/24  0707 11/18/24  0556    137 138   K 3.8 4.2 4.4    104 108   CO2 18* 19* 18*   GLUCOSE 117* 115* 128*   PHOS 3.6 3.2 4.1   BUN 49* 44* 47*   CREATININE 4.1* 3.6* 3.5*   LABGLOM 15* 17* 18*     UA bland, 100 glucose    CK 55    Renal US -> moderate bilateral hydronephrosis, small renal cysts, right kidney 12.9 cm, left kidney 14 cm    CT scan a/p wo contrast from 11/10/24  IMPRESSION:  Bladder wall thickening with internal soft tissue nodularity or dense  material (i.e.  Blood products).  Associated moderate right and severe  left-sided hydroureteronephrosis, which appears reflux related.  Complete  bladder evaluation is limited due to internal Rey catheter and lack of  contrast.  Urological follow-up is recommended for possible cystoscopy to  exclude bladder malignancy.    Lab Results   Component Value Date    IRON 99 11/10/2024    TIBC 193 (L) 11/10/2024    FERRITIN 425.0 (H) 11/10/2024   T sats 51%    Assessment/     - Acute kidney injury: urinary retention + renal hypoperfusion injury in setting of ARB use (with NSAID use), and suspected

## 2024-11-18 NOTE — PROGRESS NOTES
[facility] Progress Note           Subjective/   69 y.o. year old male who we are seeing in consultation for TONG.     The patient was seen and examined at bedside. He feels good today.  Denies CP, SOB, nausea or vomiting.  - Labs and Notes reviewed  - PMShx reviewed  - non-oliguric, 5.4L urine last 24 hours  - BP variable, stable today   - No family at bedside    ROS: No fever or chills.    Objective/   GEN:  Chronically ill, /76   Pulse 81   Temp 97.8 °F (36.6 °C) (Oral)   Resp 17   Ht 1.829 m (6')   Wt 105.3 kg (232 lb 1.6 oz)   SpO2 100%   BMI 31.48 kg/m²     HEENT: non-icteric, no JVD  CV: S1, S2 without m/r/g; no LE edema  RESP: CTA B without w/r/r; breathing wnl  ABD: +bs, soft, nt, +umbilical hernia, reducible   SKIN: warm, no rashes    Data/  Recent Labs     11/16/24  0457 11/17/24  0707 11/18/24  0556   WBC 4.7 5.3 5.2   HGB 7.6* 8.1* 7.2*   HCT 22.9* 24.2* 21.2*   MCV 97.5 97.9 98.0   * 151 173     Recent Labs     11/16/24  0457 11/17/24  0707 11/18/24  0556    137 138   K 3.8 4.2 4.4    104 108   CO2 18* 19* 18*   GLUCOSE 117* 115* 128*   PHOS 3.6 3.2 4.1   BUN 49* 44* 47*   CREATININE 4.1* 3.6* 3.5*   LABGLOM 15* 17* 18*     UA bland, 100 glucose    CK 55    Renal US -> moderate bilateral hydronephrosis, small renal cysts, right kidney 12.9 cm, left kidney 14 cm    CT scan a/p wo contrast from 11/10/24  IMPRESSION:  Bladder wall thickening with internal soft tissue nodularity or dense  material (i.e.  Blood products).  Associated moderate right and severe  left-sided hydroureteronephrosis, which appears reflux related.  Complete  bladder evaluation is limited due to internal Rey catheter and lack of  contrast.  Urological follow-up is recommended for possible cystoscopy to  exclude bladder malignancy.    Lab Results   Component Value Date    IRON 99 11/10/2024    TIBC 193 (L) 11/10/2024    FERRITIN 425.0 (H) 11/10/2024   T sats  51%    Assessment/     Acute kidney injury: urinary retention + renal hypoperfusion injury in setting of ARB use (with NSAID use), and suspected obstructive issues playing primary role here.  Given that there are no recent labs; the chronicity of all this is unclear.  High PTH (219) implies that this is partially chronic.   - SCr as high as 8.8 on admission, prior 1.4 from May 2023   - Cr continues to slowly trend down, 3.5 today  - Lytes stable, CO2 18  - non-oliguric, 5.4L urine output last 24 hours   - Urology following and s/p cysto w TURP.   - PSA 0.53  - On Bicarb gtt at 50ml/hr  Plan:   - Will stop Bicarb gtt once current bag is complete  - Will start 650mg PO Na Bicarb later today   - Encourage PO intake   - Voiding trial per Urology   - Possible Antegrade stent can be placed and internalized today with Urology   - Trend labs, weights, and urinary output      Hyperkalemia: due to TONG with ARB/NSAID use.   - resolved with fluids, lokelma.     Polyuria: he remains in post-obstructive polyuric phase.  - Will stop IVF later today  - Encourage PO intake   - Rey per Urology   - monitor Labs and urine output     Anion-gap metabolic acidosis:  - IVF with Bicarb: will stop later today  - Start Oral Bicarb tabs     Hypertension:  - BP variable, stable today   - Losartan is held.  - Norvasc 5mg daily was added --> then lowered to 2.5mg daily.  - Will increase back to 5mg daily       Anemia: macrocytic, states bone marrow biopsy ~2 years ago was unrevealing.  - S/p 1 unit prbc 11/10 and 11/15.  - Vit B12 and Folate wnl.   - May need EPO if ongoing.     Hypocalcemia: Vit D was low (20).  - S/p some IV Calcium.     Hydronephrosis / BPH with Obstruction: s/p TURP on 11/12.   - 11/13: Renal US shows only light improvement in mild-to-moderate bilateral hydronephrosis.   - 11/14: IR placed Left percutaneous Nephroureteral Tube.   - Planned to convert to internal stent today and voiding trial, per

## 2024-11-18 NOTE — OR NURSING
Pt arrived for image guided nephrostomy Tubestent exchange left . Procedure explained including the risk and benefits of the procedure. All questions answered. Pt verbalizes understanding of the procedure and states no more questions. Consent confirmed. Vital signs stable. Labs, allergies, medications, and code status reviewed. No contraindications noted.     Vital Signs  Vitals:    11/18/24 1140   BP: 139/70   Pulse: 75   Resp: 16   Temp: 98 °F (36.7 °C)   SpO2: 98%    (vital signs in table format)    Pre Claudia Score  2 - Able to move 4 extremities voluntarily on command  2 - BP+/- 20mmHg of normal  2 - Fully awake  2 - Able to maintain oxygen saturation >92% on room air  2 - Able to breathe deeply and cough freely    Allergies  Valsartan, Cipro xr, and Ciprofloxacin (allergies)    Labs  Lab Results   Component Value Date    INR 1.00 11/14/2024    PROTIME 13.4 11/14/2024     Lab Results   Component Value Date    CREATININE 3.5 (H) 11/18/2024    BUN 47 (H) 11/18/2024     11/18/2024    K 4.4 11/18/2024     11/18/2024    CO2 18 (L) 11/18/2024     Lab Results   Component Value Date    WBC 5.2 11/18/2024    HGB 7.2 (L) 11/18/2024    HCT 21.2 (L) 11/18/2024    MCV 98.0 11/18/2024     11/18/2024

## 2024-11-18 NOTE — PROGRESS NOTES
[facility] Medicine Progress Note  V 10.25      Date of Admission: 11/8/2024    Hospital Day: 11      Chief Admission Complaint:    Chief Complaint   Patient presents with    abnormal labs     Pt was seen by primary care, stated his BUN, and creatinine were elevated. Was told to come in.     Subjective:    Blood culture with acinetobacter. Will consult ID for further recs.    Pt states that he feels good overall today.     Presenting Admission History:       69 y.o. male past medical history of hyperlipidemia, hypertension, BPH, obesity, macrocytic anemia, DAQUAN, prediabetes with recent A1c of 5.4 presents from PCP office with abnormal lab findings. About 3 months ago he started valsartan. Found to have hyperkalemia and elevated creatinine as outpatient. Pt was also taking NSAIDs and has difficulty with urination due to prostate hypertrophy. He has bilateral renal hydronephrosis. CT scan ordered. Transfused 1u pRBC. Urology and nephrology consulted.     Assessment/Plan:      Current Principal Problem:  TONG (acute kidney injury) (HCC)    TONG suspect 2/2 to obstructive uropathy along with renal hypoperfusion injury in setting of ARB and NSAID use. Bilateral hydronephrosis, urinary retention, BPH with obstruction noted.  - S/P cystoscopy with right ureteral stent placement, right retrograde pyelogram, and TURP with Dr. Medrano on 11/12/24  - S/P percutaneous left nephroureteral catheter placement on 11/14/24 per IR  - Continue dennison catheter per urology  - IV rocephin per urology  - IVFs per nephrology  - Valsartan currently on hold  - Per nephrology, patient is currently in post-obstructive polyuric phase and will need maintenance IVFs for a few more days    Acinetobacter noted in blood culture  - Continue IV rocephin  - ID consulted, appreciate their input    Anemia likely multifactorial due to renal failure and hematuria  - Monitor H/H  - Transfuse if Hgb <7  - Tele monitoring     Metabolic acidosis -secondary to TONG

## 2024-11-18 NOTE — OR NURSING
Image guided ureteral stent insertion left completed. Dr. Kwon placed 8 Romansh 24 cm Ureteral stent LOT # [id number] EXP 8/17/25 in the left.  Pt tolerated procedure without any signs or symptoms of distress. Vital signs stable. Report called to patient RN. Pt transported back to [facility] in stable condition via bed by transport.     Medications  Versed: 2 mg  Fentanyl: 100 mcg    Vital Sign  Vitals:    11/18/24 1409   BP: 118/62   Pulse: 85   Resp: 15   Temp:    SpO2: 96%    (vital signs in table format)    Post Claudia  2 - Able to move 4 extremities voluntarily on command  2 - BP+/- 20mmHg of normal  2 - Fully awake  2 - Able to maintain oxygen saturation >92% on room air  2 - Able to breathe deeply and cough freely

## 2024-11-18 NOTE — CONSULTS
Infectious Diseases   Consult Note      Reason for Consult:  positive BC    Requesting Physician:  Valdemar       Date of Admission: 11/8/2024    Subjective:   CHIEF COMPLAINT:  none given       HPI:    Tylor Cochran is a 69yoM with history of HLD, HTN, BPH, obesity, DAQUAN                 ED 11/8/24 - admitted for management of TONG, urinary retention, britney hydronephrosis  Dennison placed on admission     11/12/24 - cysto, R ureteral stent, TURP   11/14/24 - L nephroureteral tube placement     Blood cutlures were collected 11/14/24  Indication for collection was high grade fever that morning, prior to procedure on that date.  One of 2 sets is positive with Acinetobacter sp.    On 11/18, had L ureteral stent placed    His fever quickly resolved  Repeat BC collected 11/17 are negative to date     WBC is wnl today  He is afebrile   Feels well, no pain.    Failed voiding trial, will DC w dennison    ID is consulted re the transient GN bacteremia       Current abx:  Rocephin 11/12-11/18       Past Surgical History:       Diagnosis Date    Acute deep vein thrombosis (DVT) of left lower extremity (HCC) 10/21/2016    Allergic rhinitis     BPH associated with nocturia     Cardiac septal defect     NO Surgery    Diverticulosis     DVT (deep venous thrombosis) (HCC)     History of colonic polyps     Hyperlipemia     Insomnia     Obesity     DAQUAN on CPAP     DAQUAN on CPAP 10/21/2016    Patellofemoral arthritis of left knee 08/26/2019    Plantar fasciitis     Prostatitis     Status post dilation of urethral narrowing     Type 2 diabetes mellitus without complication (HCC)     Type 2 diabetes mellitus without complication (HCC)          Procedure Laterality Date    COLONOSCOPY  11/2005    tubular adenoma / divertics REDO 3yrs    COLONOSCOPY  11/20/2008    Hyperplastic polyp redo 5yrs    COLONOSCOPY N/A 11/20/2013    Polyps(2).Divertics Redo 3 yrs Dr. Chaves    CYST REMOVAL      CYSTOSCOPY Bilateral 11/12/2024    CYSTOSCOPY, RETROGRADE PYELOGRAM,  Metabolic acidosis    Hyperkalemia    Iron deficiency anemia    Bilateral hydronephrosis       History of HLD, HTN, BPH, DAQUAN     Admitted 11/8/24 with urinary retention and TONG   S/p TURP and R ureteral stent on 11/12/24  S/p L nephroureteral tube placement 11/14/24  S/p internalization of L stent 11/18/24    High grade fever on 11/14/24 prior to procedure that date    Acinetobacter bacteremia, not speciated  Presumably arising from  tract   Present in 1 of 2 BC collected on 11/14/24  Pan-s organism   He has defervesced with rocephin   Repeat BC collected 11/17/24 are negative to date     Rentention / neurogenic bladder   Failed voiding trial today, will DC with dennison     Severe AoCKD, improved since admission and indices stable for several days  Estimated clearance ~25    Listed allergy to cipro - developed a rash on an upper extremity about 20-25 years ago       -given rapid resolution of fever and clearance of bacteremia, reasonable to transition to po abx to complete the course   -change to Augmentin, dose reduced for CKD, continue for another 5d, [facility]  -he has my card and can call with concerns.     All questions addressed    D/w RN       Medical Decision Making:  The following items were considered in medical decision making:  Discussion of patient care with other providers  Reviewed clinical lab tests  Reviewed radiology tests  Reviewed other diagnostic tests/interventions  Independent review of radiologic images  Microbiology cultures and other micro tests reviewed       Risk of Complications/Morbidity: moderate   Illness(es)/ Infection present that pose threat to bodily function.        Gracy Murray M.D.    Thank you for the opportunity to participate in the care of your patient.    Please do not hesitate to contact me:   [phone] office

## 2024-11-18 NOTE — PRE SEDATION
times per day     Continuous Infusions:    sodium chloride      sodium chloride      dextrose      sodium chloride 10 mL/hr at 11/15/24 1204     PRN Meds: sodium chloride, sodium chloride, cloNIDine, sodium chloride, glucose, dextrose bolus **OR** dextrose bolus, glucagon (rDNA), dextrose, sodium chloride flush, sodium chloride, ondansetron **OR** ondansetron, polyethylene glycol, acetaminophen **OR** acetaminophen  Home Meds:   Prior to Admission medications    Medication Sig Start Date End Date Taking? Authorizing Provider   Levomefolate Glucosamine (METHYL-FOLATE) 1700 MCG CAPS Take by mouth   Yes Tej Currie MD   azelastine HCl 0.15 % SOLN  4/1/23  Yes Tej Currie MD   Cyanocobalamin (VITAMIN B-12) 2500 MCG SUBL Place 1 tablet under the tongue once Takes once a week.   Yes Tej Currie MD   vitamin D (CHOLECALCIFEROL) 1000 UNIT TABS tablet Take 1 tablet by mouth daily   Yes Tej Currie MD   Multiple Vitamins-Minerals (THERAPEUTIC MULTIVITAMIN-MINERALS) tablet Take 1 tablet by mouth daily   Yes Tej Currie MD   Loratadine-Pseudoephedrine (CLARITIN-D 12 HOUR PO) Take by mouth as needed    Yes Tej Currie MD     Coumadin Use Last 7 Days:  no  Antiplatelet drug therapy use last 7 days: no  Other anticoagulant use last 7 days: no  Additional Medication Information:        Pre-Sedation Documentation and Exam:   I have reviewed the patient's history and review of systems.    Mallampati Airway Assessment:  Mallampati Class II - (soft palate, fauces & uvula are visible)    Prior History of Anesthesia Complications:   none    ASA Classification:  Class 2 - A normal healthy patient with mild systemic disease    Sedation/ Anesthesia Plan:   intravenous sedation    Medications Planned:   midazolam (Versed) intravenously and fentanyl intravenously    Patient is an appropriate candidate for plan of sedation: yes    Electronically signed by Nery Kwon MD on 11/18/2024  at 1:17 PM

## 2024-11-18 NOTE — CARE COORDINATION
Writer reviewed chart and spoke with RN, Patient is going to IR today for possible Nephrostomy exchange. H&H dropped may need another unit of PRBC. Patient IPTA, and plans to return home w/ spouse.     Yoly Vallejo RN

## 2024-11-18 NOTE — PLAN OF CARE
Problem: Chronic Conditions and Co-morbidities  Goal: Patient's chronic conditions and co-morbidity symptoms are monitored and maintained or improved  11/17/2024 2230 by Laura Sandoval RN  Outcome: Progressing     Problem: Discharge Planning  Goal: Discharge to home or other facility with appropriate resources  11/17/2024 2230 by Laura Sandoval RN  Outcome: Progressing     Problem: Safety - Adult  Goal: Free from fall injury  11/17/2024 2230 by Laura Sandoval RN  Outcome: Progressing  Bed in lowest, locked position, side rails up X2, nonskid socks on, bed check in place, call light within reach.  Instructed pt to call for assistance before getting out of bed, pt verbalized understanding. Will continue to monitor.     Problem: Pain  Goal: Verbalizes/displays adequate comfort level or baseline comfort level  11/17/2024 2230 by Laura Sandoval RN  Outcome: Progressing     Problem: Neurosensory - Adult  Goal: Achieves stable or improved neurological status  11/17/2024 2230 by Laura Sandoval RN  Outcome: Progressing     Problem: Skin/Tissue Integrity - Adult  Goal: Skin integrity remains intact  11/17/2024 2230 by Laura Sandoval RN  Outcome: Progressing     Problem: Musculoskeletal - Adult  Goal: Return mobility to safest level of function  11/17/2024 2230 by Laura Sandoval RN  Outcome: Progressing     Problem: Genitourinary - Adult  Goal: Absence of urinary retention  11/17/2024 2230 by Laura Sandoval RN  Outcome: Progressing     Problem: Genitourinary - Adult  Goal: Urinary catheter remains patent  11/17/2024 2230 by Laura Sandoval RN  Outcome: Progressing     Problem: Metabolic/Fluid and Electrolytes - Adult  Goal: Electrolytes maintained within normal limits  11/17/2024 2230 by Laura Sandoval RN  Outcome: Progressing     Problem: Metabolic/Fluid and Electrolytes - Adult  Goal: Hemodynamic stability and optimal renal function maintained  11/17/2024 2230 by Laura Sandoval RN  Outcome: Progressing      Problem: Metabolic/Fluid and Electrolytes - Adult  Goal: Glucose maintained within prescribed range  11/17/2024 2230 by Laura Sandoval, RN  Outcome: Progressing     Problem: Hematologic - Adult  Goal: Maintains hematologic stability  11/17/2024 2230 by Laura Sandoval, RN  Outcome: Progressing

## 2024-11-18 NOTE — PROGRESS NOTES
Pt Name: Tylor Cochran  Medical Record Number: [id number]  Date of Birth 1955   Today's Date: 11/18/2024      Subjective: Awake in bed, no complaints. Nephrostomy drain with clear/yellow output    ROS: Constitutional: No fever    Vitals:  Vitals:    11/17/24 1951 11/17/24 2302 11/18/24 0309 11/18/24 0923   BP: (!) 106/47 (!) 105/57 118/63 137/76   Pulse: 86 77 75 81   Resp: 14 16 17 17   Temp: 97.8 °F (36.6 °C) 97.5 °F (36.4 °C) 97.6 °F (36.4 °C) 97.8 °F (36.6 °C)   TempSrc: Oral Oral Oral Oral   SpO2: 98% 98% 97% 100%   Weight:       Height:         I/O last 3 completed shifts:  In: 960 [P.O.:960]  Out: 8195 [Urine:8195]    Exam:  General: Awake, oriented, no acute distress  Respiratory: Nonlabored breathing  Abdomen: Soft, non-tender, non-distended, no masses  : dennison catheter intact with clear/yellow output, nephrostomy drain intact with mostly clear/slight pink tinge output  Skin: Skin color, texture, turgor normal, no rashes or lesions  Neurologic: no gross deficits       CURRENT MEDICATIONS   Scheduled Meds:   amLODIPine  2.5 mg Oral Daily    insulin lispro  0-4 Units SubCUTAneous 4x Daily AC & HS    Vitamin D  2,000 Units Oral Daily    cefTRIAXone (ROCEPHIN) IV  1,000 mg IntraVENous Q24H    tamsulosin  0.4 mg Oral Daily    [Held by provider] cetirizine  10 mg Oral Daily    melatonin  10 mg Oral Nightly    sodium chloride flush  5-40 mL IntraVENous 2 times per day    [Held by provider] heparin (porcine)  5,000 Units SubCUTAneous 3 times per day     Continuous Infusions:   sodium bicarbonate 75 mEq in sodium chloride 0.45 % 1,000 mL infusion 50 mL/hr at 11/17/24 2008    sodium chloride      sodium chloride      dextrose      sodium chloride 10 mL/hr at 11/15/24 1204     PRN Meds:.sodium chloride, sodium chloride, cloNIDine, sodium chloride, glucose, dextrose bolus **OR** dextrose bolus, glucagon (rDNA), dextrose, sodium chloride flush, sodium chloride, ondansetron **OR** ondansetron, polyethylene

## 2024-11-19 VITALS
RESPIRATION RATE: 16 BRPM | HEART RATE: 76 BPM | SYSTOLIC BLOOD PRESSURE: 138 MMHG | TEMPERATURE: 97.7 F | DIASTOLIC BLOOD PRESSURE: 79 MMHG | BODY MASS INDEX: 31.44 KG/M2 | HEIGHT: 72 IN | OXYGEN SATURATION: 99 % | WEIGHT: 232.1 LBS

## 2024-11-19 LAB
ALBUMIN SERPL-MCNC: 3.3 G/DL (ref 3.4–5)
ANION GAP SERPL CALCULATED.3IONS-SCNC: 13 MMOL/L (ref 3–16)
BASOPHILS # BLD: 0 K/UL (ref 0–0.2)
BASOPHILS NFR BLD: 0.6 %
BUN SERPL-MCNC: 46 MG/DL (ref 7–20)
CALCIUM SERPL-MCNC: 8.4 MG/DL (ref 8.3–10.6)
CHLORIDE SERPL-SCNC: 107 MMOL/L (ref 99–110)
CO2 SERPL-SCNC: 18 MMOL/L (ref 21–32)
CREAT SERPL-MCNC: 3.4 MG/DL (ref 0.8–1.3)
DEPRECATED RDW RBC AUTO: 14.9 % (ref 12.4–15.4)
EOSINOPHIL # BLD: 0.4 K/UL (ref 0–0.6)
EOSINOPHIL NFR BLD: 6 %
GFR SERPLBLD CREATININE-BSD FMLA CKD-EPI: 19 ML/MIN/{1.73_M2}
GLUCOSE BLD-MCNC: 112 MG/DL (ref 70–99)
GLUCOSE BLD-MCNC: 118 MG/DL (ref 70–99)
GLUCOSE BLD-MCNC: 98 MG/DL (ref 70–99)
GLUCOSE SERPL-MCNC: 109 MG/DL (ref 70–99)
HCT VFR BLD AUTO: 22.3 % (ref 40.5–52.5)
HGB BLD-MCNC: 7.4 G/DL (ref 13.5–17.5)
LYMPHOCYTES # BLD: 1 K/UL (ref 1–5.1)
LYMPHOCYTES NFR BLD: 16.1 %
MCH RBC QN AUTO: 32.6 PG (ref 26–34)
MCHC RBC AUTO-ENTMCNC: 33.2 G/DL (ref 31–36)
MCV RBC AUTO: 98.2 FL (ref 80–100)
MONOCYTES # BLD: 0.6 K/UL (ref 0–1.3)
MONOCYTES NFR BLD: 9.4 %
NEUTROPHILS # BLD: 4.1 K/UL (ref 1.7–7.7)
NEUTROPHILS NFR BLD: 67.9 %
PERFORMED ON: ABNORMAL
PERFORMED ON: ABNORMAL
PERFORMED ON: NORMAL
PHOSPHATE SERPL-MCNC: 5 MG/DL (ref 2.5–4.9)
PLATELET # BLD AUTO: 205 K/UL (ref 135–450)
PMV BLD AUTO: 8 FL (ref 5–10.5)
POTASSIUM SERPL-SCNC: 4.6 MMOL/L (ref 3.5–5.1)
RBC # BLD AUTO: 2.27 M/UL (ref 4.2–5.9)
SODIUM SERPL-SCNC: 138 MMOL/L (ref 136–145)
WBC # BLD AUTO: 6 K/UL (ref 4–11)

## 2024-11-19 PROCEDURE — 6370000000 HC RX 637 (ALT 250 FOR IP): Performed by: INTERNAL MEDICINE

## 2024-11-19 PROCEDURE — 2580000003 HC RX 258: Performed by: UROLOGY

## 2024-11-19 PROCEDURE — 51702 INSERT TEMP BLADDER CATH: CPT

## 2024-11-19 PROCEDURE — 6360000002 HC RX W HCPCS: Performed by: NURSE PRACTITIONER

## 2024-11-19 PROCEDURE — 36415 COLL VENOUS BLD VENIPUNCTURE: CPT

## 2024-11-19 PROCEDURE — 99222 1ST HOSP IP/OBS MODERATE 55: CPT | Performed by: INTERNAL MEDICINE

## 2024-11-19 PROCEDURE — 2580000003 HC RX 258: Performed by: NURSE PRACTITIONER

## 2024-11-19 PROCEDURE — 51798 US URINE CAPACITY MEASURE: CPT

## 2024-11-19 PROCEDURE — 80069 RENAL FUNCTION PANEL: CPT

## 2024-11-19 PROCEDURE — 6370000000 HC RX 637 (ALT 250 FOR IP)

## 2024-11-19 PROCEDURE — 6370000000 HC RX 637 (ALT 250 FOR IP): Performed by: UROLOGY

## 2024-11-19 PROCEDURE — 85025 COMPLETE CBC W/AUTO DIFF WBC: CPT

## 2024-11-19 RX ORDER — SODIUM BICARBONATE 650 MG/1
650 TABLET ORAL 2 TIMES DAILY
Qty: 30 TABLET | Refills: 1 | Status: SHIPPED | OUTPATIENT
Start: 2024-11-19

## 2024-11-19 RX ORDER — AMOXICILLIN AND CLAVULANATE POTASSIUM 500; 125 MG/1; MG/1
1 TABLET, FILM COATED ORAL 2 TIMES DAILY
Qty: 11 TABLET | Refills: 0 | Status: SHIPPED | OUTPATIENT
Start: 2024-11-19 | End: 2024-11-25

## 2024-11-19 RX ORDER — SODIUM BICARBONATE 650 MG/1
650 TABLET ORAL 2 TIMES DAILY
Status: DISCONTINUED | OUTPATIENT
Start: 2024-11-19 | End: 2024-11-19 | Stop reason: HOSPADM

## 2024-11-19 RX ORDER — AMLODIPINE BESYLATE 5 MG/1
5 TABLET ORAL DAILY
Qty: 30 TABLET | Refills: 3 | Status: SHIPPED | OUTPATIENT
Start: 2024-11-20

## 2024-11-19 RX ORDER — SODIUM BICARBONATE 650 MG/1
650 TABLET ORAL 3 TIMES DAILY
Status: DISCONTINUED | OUTPATIENT
Start: 2024-11-19 | End: 2024-11-19

## 2024-11-19 RX ORDER — TAMSULOSIN HYDROCHLORIDE 0.4 MG/1
0.4 CAPSULE ORAL DAILY
Qty: 30 CAPSULE | Refills: 3 | Status: SHIPPED | OUTPATIENT
Start: 2024-11-20

## 2024-11-19 RX ADMIN — TAMSULOSIN HYDROCHLORIDE 0.4 MG: 0.4 CAPSULE ORAL at 08:21

## 2024-11-19 RX ADMIN — Medication 2000 UNITS: at 08:21

## 2024-11-19 RX ADMIN — SODIUM BICARBONATE 650 MG: 650 TABLET ORAL at 08:21

## 2024-11-19 RX ADMIN — SODIUM BICARBONATE 650 MG: 650 TABLET ORAL at 13:35

## 2024-11-19 RX ADMIN — AMLODIPINE BESYLATE 5 MG: 5 TABLET ORAL at 08:22

## 2024-11-19 RX ADMIN — Medication 10 ML: at 08:22

## 2024-11-19 NOTE — PROGRESS NOTES
[facility] Progress Note           Subjective/   69 y.o. year old male who we are seeing in consultation for TONG.     The patient was seen and examined at bedside. He feels good today.  Just had his dennison removed and starting voiding trail. Denies CP, SOB, nausea or vomiting.  - Labs and Notes reviewed  - PMShx reviewed  - non-oliguric, over 2L urine last 24 hours  - BP stable   - No family at bedside    ROS: No fever or chills.    Objective/   GEN:  Chronically ill, /77   Pulse 74   Temp 98 °F (36.7 °C) (Oral)   Resp 15   Ht 1.829 m (6')   Wt 105.3 kg (232 lb 1.6 oz)   SpO2 100%   BMI 31.48 kg/m²     HEENT: non-icteric, no JVD  CV: S1, S2 without m/r/g; no LE edema  RESP: CTA B without w/r/r; breathing wnl  ABD: +bs, soft, nt, +umbilical hernia, reducible   SKIN: warm, no rashes    Data/  Recent Labs     11/17/24  0707 11/18/24  0556 11/19/24  0459   WBC 5.3 5.2 6.0   HGB 8.1* 7.2* 7.4*   HCT 24.2* 21.2* 22.3*   MCV 97.9 98.0 98.2    173 205     Recent Labs     11/17/24  0707 11/18/24  0556 11/19/24  0459    138 138   K 4.2 4.4 4.6    108 107   CO2 19* 18* 18*   GLUCOSE 115* 128* 109*   PHOS 3.2 4.1 5.0*   BUN 44* 47* 46*   CREATININE 3.6* 3.5* 3.4*   LABGLOM 17* 18* 19*     UA bland, 100 glucose    CK 55    Renal US -> moderate bilateral hydronephrosis, small renal cysts, right kidney 12.9 cm, left kidney 14 cm    CT scan a/p wo contrast from 11/10/24  IMPRESSION:  Bladder wall thickening with internal soft tissue nodularity or dense  material (i.e.  Blood products).  Associated moderate right and severe  left-sided hydroureteronephrosis, which appears reflux related.  Complete  bladder evaluation is limited due to internal Dennison catheter and lack of  contrast.  Urological follow-up is recommended for possible cystoscopy to  exclude bladder malignancy.    Lab Results   Component Value Date    IRON 99 11/10/2024    TIBC 193 (L) 11/10/2024    FERRITIN 425.0  (H) 11/10/2024   T sats 51%    Assessment/     Acute kidney injury: urinary retention + renal hypoperfusion injury in setting of ARB use (with NSAID use), and suspected obstructive issues playing primary role here.  Given that there are no recent labs; the chronicity of all this is unclear.  High PTH (219) implies that this is partially chronic.   - SCr as high as 8.8 on admission, prior 1.4 from May 2023   - Cr continues to slowly trend down, 3.4 today  - Lytes stable, CO2 18  - non-oliguric, 2L urine output last 24 hours   - Urology following and s/p cysto w TURP.   - s/p Bicarb gtt  - s/p Antegrade Double J Stent Placement and drain removal 11/18/24   Plan:   - Continue 650mg PO Na Bicarb TID   - Encourage PO intake   - Voiding trial per Urology, check PVR   Dispo: Okay to discharge from nephro standpoint after voiding trial      Hyperkalemia: due to TONG with ARB/NSAID use.   - resolved with fluids, lokelma.     Polyuria: he remains in post-obstructive polyuric phase.  - Off IVF  - Encourage PO intake   - Rey removed per Urology, starting voiding trail this morning    - monitor Labs and urine output     Anion-gap metabolic acidosis:  - Off IVF with Bicarb  - Continue Oral Bicarb tabs     Hypertension:  - BP stable   - Losartan is held.  - Continue Norvasc 5mg daily     Anemia: macrocytic, states bone marrow biopsy ~2 years ago was unrevealing.  - S/p 1 unit prbc 11/10 and 11/15.  - Vit B12 and Folate wnl.   - May need EPO if ongoing.     Hypocalcemia: Vit D was low (20).  - S/p some IV Calcium.     Hydronephrosis / BPH with Obstruction: s/p TURP on 11/12.   - 11/13: Renal US shows only light improvement in mild-to-moderate bilateral hydronephrosis.   - 11/14: IR placed Left percutaneous Nephroureteral Tube.   - s/p Antegrade Double J Stent Placement and drain removal 11/18/24   ____________________________________  SANTIAGO Díaz - NP  [facility]  [email or web address]  Office:

## 2024-11-19 NOTE — PROGRESS NOTES
Writer encouraged pt to void. After voiding writer bladder scanned pt and it read +999. Writer then inserted 18 [place] dennison. 1000ml of urine drained from bladder. Hospitalist VY Woodard notified.    Message left for Urology VY Castaneda

## 2024-11-19 NOTE — CARE COORDINATION
Writer reviewed chart, patient failed voiding trial, urology updated. Patient will likely DC home with dennison, after ID gives input.     Yoly Vallejo RN

## 2024-11-19 NOTE — PROGRESS NOTES
Patient and family given discharge instructions. All questions and concerns were addressed. Patient nephrostomy dressing was clean dry and intact. Pt discharged with dennison and educated on dennison care. IV and tele removed without complications. Patient wheeled to car with all patient belongings.

## 2024-11-19 NOTE — PLAN OF CARE
Problem: Chronic Conditions and Co-morbidities  Goal: Patient's chronic conditions and co-morbidity symptoms are monitored and maintained or improved  11/19/2024 0943 by Risa Muller RN  Outcome: Progressing     Problem: Discharge Planning  Goal: Discharge to home or other facility with appropriate resources  11/19/2024 0943 by Risa Muller RN  Outcome: Progressing     Problem: Safety - Adult  Goal: Free from fall injury  11/19/2024 0943 by Risa Muller RN  Outcome: Progressing

## 2024-11-19 NOTE — PLAN OF CARE
Problem: Chronic Conditions and Co-morbidities  Goal: Patient's chronic conditions and co-morbidity symptoms are monitored and maintained or improved  Outcome: Progressing     Problem: Discharge Planning  Goal: Discharge to home or other facility with appropriate resources  Outcome: Progressing     Problem: Safety - Adult  Goal: Free from fall injury  Outcome: Progressing     Problem: Pain  Goal: Verbalizes/displays adequate comfort level or baseline comfort level  Outcome: Progressing  Flowsheets (Taken 11/18/2024 2000)  Verbalizes/displays adequate comfort level or baseline comfort level: Encourage patient to monitor pain and request assistance     Problem: Neurosensory - Adult  Goal: Achieves stable or improved neurological status  Outcome: Progressing     Problem: Skin/Tissue Integrity - Adult  Goal: Skin integrity remains intact  Outcome: Progressing     Problem: Musculoskeletal - Adult  Goal: Return mobility to safest level of function  Outcome: Progressing     Problem: Genitourinary - Adult  Goal: Absence of urinary retention  Outcome: Progressing  Goal: Urinary catheter remains patent  Outcome: Progressing     Problem: Metabolic/Fluid and Electrolytes - Adult  Goal: Electrolytes maintained within normal limits  Outcome: Progressing  Goal: Hemodynamic stability and optimal renal function maintained  Outcome: Progressing  Goal: Glucose maintained within prescribed range  Outcome: Progressing     Problem: Hematologic - Adult  Goal: Maintains hematologic stability  Outcome: Progressing

## 2024-11-19 NOTE — PROGRESS NOTES
[facility] Progress Note           Subjective/   69 y.o. year old male who we are seeing in consultation for TONG.     HPI:  Resting    ROS: No fever or chills.  Social: No family at bedside.    Objective/   GEN:  Chronically ill, /77   Pulse 74   Temp 98 °F (36.7 °C) (Oral)   Resp 15   Ht 1.829 m (6')   Wt 105.3 kg (232 lb 1.6 oz)   SpO2 100%   BMI 31.48 kg/m²     HEENT: non-icteric, no JVD  CV: S1, S2 without m/r/g; no LE edema  RESP: CTA B without w/r/r; breathing wnl  ABD: +bs, soft, nt, no hsm, +umbilical hernia, reducible   SKIN: warm, no rashes    Data/  Recent Labs     11/17/24  0707 11/18/24  0556 11/19/24  0459   WBC 5.3 5.2 6.0   HGB 8.1* 7.2* 7.4*   HCT 24.2* 21.2* 22.3*   MCV 97.9 98.0 98.2    173 205     Recent Labs     11/17/24  0707 11/18/24  0556 11/19/24  0459    138 138   K 4.2 4.4 4.6    108 107   CO2 19* 18* 18*   GLUCOSE 115* 128* 109*   PHOS 3.2 4.1 5.0*   BUN 44* 47* 46*   CREATININE 3.6* 3.5* 3.4*   LABGLOM 17* 18* 19*     UA bland, 100 glucose    CK 55    Renal US -> moderate bilateral hydronephrosis, small renal cysts, right kidney 12.9 cm, left kidney 14 cm    CT scan a/p wo contrast from 11/10/24  IMPRESSION:  Bladder wall thickening with internal soft tissue nodularity or dense  material (i.e.  Blood products).  Associated moderate right and severe  left-sided hydroureteronephrosis, which appears reflux related.  Complete  bladder evaluation is limited due to internal Rey catheter and lack of  contrast.  Urological follow-up is recommended for possible cystoscopy to  exclude bladder malignancy.    Lab Results   Component Value Date    IRON 99 11/10/2024    TIBC 193 (L) 11/10/2024    FERRITIN 425.0 (H) 11/10/2024   T sats 51%    Assessment/     - Acute kidney injury: urinary retention + renal hypoperfusion injury in setting of ARB use (with NSAID use), and suspected obstructive issues playing primary role here.  Given that there

## 2024-11-19 NOTE — PROGRESS NOTES
Pt Name: Tylor Cochran  Medical Record Number: [id number]  Date of Birth 1955   Today's Date: 11/19/2024      Subjective: Awake in bed, no complaints.    ROS: Constitutional: No fever    Vitals:  Vitals:    11/18/24 2000 11/18/24 2324 11/19/24 0338 11/19/24 0818   BP: 116/67 118/76 119/65 129/77   Pulse: 88 77 75 74   Resp: 16 16 16 15   Temp: 97.9 °F (36.6 °C) 98 °F (36.7 °C) 98 °F (36.7 °C) 98 °F (36.7 °C)   TempSrc: Oral Oral Oral Oral   SpO2: 98% 98% 97% 100%   Weight:       Height:         I/O last 3 completed shifts:  In: 0   Out: 4475 [Urine:4475]    Exam:  General: Awake, oriented, no acute distress  Respiratory: Nonlabored breathing  Abdomen: Soft, non-tender, non-distended, no masses  : dennison catheter intact with clear/yellow output  Skin: Skin color, texture, turgor normal, no rashes or lesions  Neurologic: no gross deficits       CURRENT MEDICATIONS   Scheduled Meds:   sodium bicarbonate  650 mg Oral TID    amLODIPine  5 mg Oral Daily    cefTRIAXone (ROCEPHIN) IV  1,000 mg IntraVENous Q24H    insulin lispro  0-4 Units SubCUTAneous 4x Daily AC & HS    Vitamin D  2,000 Units Oral Daily    tamsulosin  0.4 mg Oral Daily    [Held by provider] cetirizine  10 mg Oral Daily    melatonin  10 mg Oral Nightly    sodium chloride flush  5-40 mL IntraVENous 2 times per day    [Held by provider] heparin (porcine)  5,000 Units SubCUTAneous 3 times per day     Continuous Infusions:   sodium chloride      sodium chloride      dextrose      sodium chloride 10 mL/hr at 11/15/24 1204     PRN Meds:.sodium chloride, sodium chloride, cloNIDine, sodium chloride, glucose, dextrose bolus **OR** dextrose bolus, glucagon (rDNA), dextrose, sodium chloride flush, sodium chloride, ondansetron **OR** ondansetron, polyethylene glycol, acetaminophen **OR** acetaminophen    LABS     Recent Labs     11/17/24  0707 11/18/24  0556 11/19/24  0459   WBC 5.3 5.2 6.0   HGB 8.1* 7.2* 7.4*   HCT 24.2* 21.2* 22.3*    173 205   NA  n/a

## 2024-11-20 ENCOUNTER — CARE COORDINATION (OUTPATIENT)
Dept: CASE MANAGEMENT | Age: 69
End: 2024-11-20

## 2024-11-20 DIAGNOSIS — N17.9 AKI (ACUTE KIDNEY INJURY) (HCC): Primary | ICD-10-CM

## 2024-11-20 PROCEDURE — 1111F DSCHRG MED/CURRENT MED MERGE: CPT | Performed by: NURSE PRACTITIONER

## 2024-11-20 NOTE — CARE COORDINATION
given an opportunity to ask questions; all questions answered at this time.. The patient verbalized understanding.   Were discharge instructions available to patient? Yes.   Reviewed appropriate site of care based on symptoms and resources available to patient including: PCP  Specialist  When to call 911. The patient agrees to contact the primary care provider and/or specialist office for questions related to their healthcare.      Advance Care Planning:   Does patient have an Advance Directive: Not on file; patient encouraged to bring existing ACP documents to a [facility] facility..    Medication Reconciliation:  Medication reconciliation was performed with patient,1111F entered: yes.     Remote Patient Monitoring:  Offered patient enrollment in the Remote Patient Monitoring (RPM) program for in-home monitoring: discuss on follow up    Assessments:  Care Transitions 24 Hour Call    Schedule Follow Up Appointment with PCP: Completed  Do you have a copy of your discharge instructions?: Yes  Do you have all of your prescriptions and are they filled?: Yes  Have you been contacted by a [facility] Pharmacist?: No  Have you scheduled your follow up appointment?: Yes  How are you going to get to your appointment?: Car - family or friend to transport  Do you have support at home?: Partner/Spouse/SO, Child  Do you feel like you have everything you need to keep you well at home?: Yes  Are you an active caregiver in your home?: No  Care Transitions Interventions          Follow Up Appointment:   Discussed follow up appointments. Patient has hospital follow up appointment scheduled within 7 days of discharge.   Future Appointments         Provider Specialty Dept Phone    11/22/2024 8:45 AM Yung Begum, DO Internal Medicine [phone]            Care Transition Nurse provided contact information.  Plan for follow-up call in 6-10 days based on severity of symptoms and risk factors.  Plan for next call: self management-urinary catheter

## 2024-11-20 NOTE — DISCHARGE SUMMARY
Schmorl's nodes are noted at L1 and L3.  There is a small fat containing umbilical hernia.     Bladder wall thickening with internal soft tissue nodularity or dense material (i.e.  Blood products).  Associated moderate right and severe left-sided hydroureteronephrosis, which appears reflux related.  Complete bladder evaluation is limited due to internal Rey catheter and lack of contrast.  Urological follow-up is recommended for possible cystoscopy to exclude bladder malignancy.     US RENAL COMPLETE    Result Date: 11/9/2024  EXAMINATION: RETROPERITONEAL ULTRASOUND OF THE KIDNEYS AND URINARY BLADDER 11/9/2024 COMPARISON: None HISTORY: ORDERING SYSTEM PROVIDED HISTORY: TONG TECHNOLOGIST PROVIDED HISTORY: Reason for exam:->TONG FINDINGS: Kidneys: The right kidney measures 12.9 cm in length and the left kidney measures 14 cm in length. Moderate hydronephrosis is seen on the right Moderate hydronephrosis is seen on the left. 9 mm cyst seen in right kidney 1.7 cm cyst seen in left kidney. Bladder: Collapsed and not well evaluated.  Rey catheter is seen     Moderate bilateral hydronephrosis Small renal cysts     Exercise stress test    Result Date: 11/6/2024    ECG: The stress ECG was negative for ischemia.   Stress Test: A Paolo protocol stress test was performed.  The patient was only able to exercise for a total of 2 minutes and 55 seconds on standard Paolo protocol.  This exercise time is below expected for the patient's age.   Stress Test: A Paolo protocol stress test was performed.   Stress Test: A Paolo protocol stress test was performed.       Consults:     IP CONSULT TO NEPHROLOGY  IP CONSULT TO NEPHROLOGY  IP CONSULT TO UROLOGY  IP CONSULT TO INTERVENTIONAL RADIOLOGY  IP CONSULT TO INFECTIOUS DISEASES    Labs:     Recent Labs     11/18/24  0556 11/19/24  0459   WBC 5.2 6.0   HGB 7.2* 7.4*   HCT 21.2* 22.3*    205     Recent Labs     11/18/24  0556 11/19/24  0459    138   K 4.4 4.6    107

## 2024-11-21 LAB
BACTERIA BLD CULT ORG #2: NORMAL
BACTERIA BLD CULT: NORMAL

## 2024-11-21 NOTE — CARE COORDINATION
Follow-Up copy of Important Message from Medicare (IMM2) has been explained to patient and/or designated healthcare decision maker (HCDM). Pt and/or HCDM aware that patient is permitted to stay an additional 4 hours prior to discharge to consider an appeal if they feel as though they are being discharged too soon. Patient may discharge as planned if chooses to do so.  Patient/HCDM voice no other concerns or questions regarding this process.      Yoly Vallejo RN

## 2024-11-22 ENCOUNTER — OFFICE VISIT (OUTPATIENT)
Dept: INTERNAL MEDICINE CLINIC | Age: 69
End: 2024-11-22

## 2024-11-22 VITALS
OXYGEN SATURATION: 96 % | WEIGHT: 229 LBS | DIASTOLIC BLOOD PRESSURE: 60 MMHG | SYSTOLIC BLOOD PRESSURE: 97 MMHG | BODY MASS INDEX: 31.06 KG/M2 | HEART RATE: 78 BPM

## 2024-11-22 DIAGNOSIS — E87.20 METABOLIC ACIDOSIS: ICD-10-CM

## 2024-11-22 DIAGNOSIS — N17.9 AKI (ACUTE KIDNEY INJURY) (HCC): ICD-10-CM

## 2024-11-22 DIAGNOSIS — Z09 HOSPITAL DISCHARGE FOLLOW-UP: Primary | ICD-10-CM

## 2024-11-22 DIAGNOSIS — I10 PRIMARY HYPERTENSION: ICD-10-CM

## 2024-11-22 DIAGNOSIS — D64.9 ANEMIA, UNSPECIFIED TYPE: ICD-10-CM

## 2024-11-22 NOTE — PROGRESS NOTES
Post-Discharge Transitional Care  Follow Up      Tylor Cochran   YOB: 1955    Date of Office Visit:  11/22/2024  Date of Hospital Admission: 11/8/24  Date of Hospital Discharge: 11/19/24  Risk of hospital readmission (high >=14%. Medium >=10%) :Readmission Risk Score: 18.8      Care management risk score Rising risk (score 2-5) and Complex Care (Scores >=6): No Risk Score On File     Non face to face  following discharge, date last encounter closed (first attempt may have been earlier): 11/20/2024    Call initiated 2 business days of discharge: Yes    ASSESSMENT/PLAN:   Hospital discharge follow-up  -     [place] DISCHARGE MEDS RECONCILED W/ CURRENT OUTPATIENT MED LIST    Patient is day 3 since discharge.  Doing well.  Reviewed his medicines with him.  Reviewed his follow-ups with him as well.    TONG (acute kidney injury) (HCC)  -     Comprehensive Metabolic Panel; Future    Continue to produce urine well.  Self catheterizing at this time per urology.  Repeat labs are pending as well as follow-up with nephrology.    Primary hypertension    BP in office today is borderline low, he does endorse symptoms.  Home BP log also shows consistently systolic 90s.  Have asked him to hold amlodipine for now, continue with BP log and bring it to nephrology follow-up next week.  Counseled him that if blood pressure remains greater than 130/80 at home, to resume amlodipine.  Could consider 2.5 mg dose as well.    Metabolic acidosis  -     Comprehensive Metabolic Panel; Future    On sodium bicarbonate.  Repeat labs are pending, expect to improve with correction of renal function.  May discontinue per nephrology.    Anemia, unspecified type  -     CBC with Auto Differential; Future    Mixed etiology, small amount of blood loss, also likely has underproduction due to impaired renal function and possibly from liver disease.  Follow-up with GI is pending. EPO may be started per nephro.    Medical Decision Making: moderate

## 2024-11-22 NOTE — PATIENT INSTRUCTIONS
-Discontinue your amlodipine for now, continue to measure your blood pressure daily.  If blood pressure is normal off of the amlodipine, can continue to hold it.  Record your blood pressure readings and bring them to your office visit with nephrology next week on Tuesday.  -Continue with sodium bicarbonate, this may be possibly discontinued pending repeat lab work and your follow-up with nephrology  -Continue with Augmentin to completion  -Lab work next week  -Follow-up with GI on Monday, nephrology on Tuesday, and urology in December  -Return to office in 1 month or sooner if needed

## 2024-11-25 DIAGNOSIS — N17.9 AKI (ACUTE KIDNEY INJURY) (HCC): ICD-10-CM

## 2024-11-25 DIAGNOSIS — D64.9 ANEMIA, UNSPECIFIED TYPE: ICD-10-CM

## 2024-11-25 DIAGNOSIS — E87.20 METABOLIC ACIDOSIS: ICD-10-CM

## 2024-11-25 LAB
ALBUMIN SERPL-MCNC: 3.9 G/DL (ref 3.4–5)
ALBUMIN/GLOB SERPL: 1.4 {RATIO} (ref 1.1–2.2)
ALP SERPL-CCNC: 105 U/L (ref 40–129)
ALT SERPL-CCNC: 29 U/L (ref 10–40)
ANION GAP SERPL CALCULATED.3IONS-SCNC: 12 MMOL/L (ref 3–16)
AST SERPL-CCNC: 22 U/L (ref 15–37)
BASOPHILS # BLD: 0 K/UL (ref 0–0.2)
BASOPHILS NFR BLD: 0.7 %
BILIRUB SERPL-MCNC: 0.3 MG/DL (ref 0–1)
BUN SERPL-MCNC: 38 MG/DL (ref 7–20)
CALCIUM SERPL-MCNC: 9.3 MG/DL (ref 8.3–10.6)
CHLORIDE SERPL-SCNC: 101 MMOL/L (ref 99–110)
CO2 SERPL-SCNC: 20 MMOL/L (ref 21–32)
CREAT SERPL-MCNC: 3.4 MG/DL (ref 0.8–1.3)
DEPRECATED RDW RBC AUTO: 15.1 % (ref 12.4–15.4)
EOSINOPHIL # BLD: 0.2 K/UL (ref 0–0.6)
EOSINOPHIL NFR BLD: 3.7 %
GFR SERPLBLD CREATININE-BSD FMLA CKD-EPI: 19 ML/MIN/{1.73_M2}
GLUCOSE SERPL-MCNC: 108 MG/DL (ref 70–99)
HCT VFR BLD AUTO: 25.8 % (ref 40.5–52.5)
HGB BLD-MCNC: 8.5 G/DL (ref 13.5–17.5)
LYMPHOCYTES # BLD: 1 K/UL (ref 1–5.1)
LYMPHOCYTES NFR BLD: 16.5 %
MCH RBC QN AUTO: 33.2 PG (ref 26–34)
MCHC RBC AUTO-ENTMCNC: 33.2 G/DL (ref 31–36)
MCV RBC AUTO: 100 FL (ref 80–100)
MONOCYTES # BLD: 0.4 K/UL (ref 0–1.3)
MONOCYTES NFR BLD: 6.4 %
NEUTROPHILS # BLD: 4.4 K/UL (ref 1.7–7.7)
NEUTROPHILS NFR BLD: 72.7 %
PLATELET # BLD AUTO: 373 K/UL (ref 135–450)
PMV BLD AUTO: 7.5 FL (ref 5–10.5)
POTASSIUM SERPL-SCNC: 5.6 MMOL/L (ref 3.5–5.1)
PROT SERPL-MCNC: 6.6 G/DL (ref 6.4–8.2)
RBC # BLD AUTO: 2.58 M/UL (ref 4.2–5.9)
SODIUM SERPL-SCNC: 133 MMOL/L (ref 136–145)
WBC # BLD AUTO: 6.1 K/UL (ref 4–11)

## 2024-11-27 ENCOUNTER — CARE COORDINATION (OUTPATIENT)
Dept: CASE MANAGEMENT | Age: 69
End: 2024-11-27

## 2024-11-27 NOTE — CARE COORDINATION
Care Transitions Note    Follow Up Call     TONG nephrostomy urinary dennison     Patient Current Location:  Home: [address] [place] [id number]    [facility] Care Coordinator contacted the patient by telephone. Verified name and  as identifiers.    Additional needs identified to be addressed with provider   No needs identified                 Method of communication with provider: none.    Care Summary Note: Spoke with Tylor Cochran who reported that he is doing good. Patient stated that lab values are going in the right direction. Patient stated that he now straight cath 2 times a day which is going well and no issues. Patient stated that he is starting to get his energy back. Patient stated that hemoglobin was 7.2 and now 8.5 which is still low but it feels a lot better. Patient reported that BP is averaging  120/80. Patient reported that he has followed up with GI,PCP and yesterday nephology. Patient denied cp, sob, cough, dizziness, headache, n/v, diarrhea, abdominal pains, fever, or chills. Patient report that appetite and fluid intake is good and denied any problems with bowel. Patient reported that he is taking all medications as ordered. Patient denied any other needs at this time. Patient instructed to continue to monitor s/s, reporting any that may present to MD immediately for early intervention.  Patient is agreeable to f/u calls    Plan of care updates since last contact:  Straight cath twice a day       Advance Care Planning   The patient has the following advanced directives on file:  Advance Directives       Power of  Living Will ACP-Advance Directive ACP-Power of     Not on File Not on File Not on File Not on File            The patient has appointed the following active healthcare agents:    Primary Decision Maker: Kathy Cochran - Spouse - [phone]    Secondary Decision Maker: Sarah Cochran - Brother/Sister - [phone]           Medication Review:  No changes since

## 2024-12-04 ENCOUNTER — CARE COORDINATION (OUTPATIENT)
Dept: CASE MANAGEMENT | Age: 69
End: 2024-12-04

## 2024-12-04 NOTE — CARE COORDINATION
Care Transitions Note    Follow Up Call     Patient Current Location:  [place]    Care Transition Nurse contacted the patient by telephone. Verified name and  as identifiers.    Additional needs identified to be addressed with provider   No needs identified                 Method of communication with provider: none.    Care Summary Note: CTN spoke with patient who reported he is doing pretty good and now able to walk his dogs and was out yesterday hanging up Tabitha lights. Patient reported he is urinating well via self cath. Patient had blood work performed and it is starting to trend in the right direction. Patient reported his BP has been stable, patient was instructed to take BP medication only if SBP was 140 or above and he has only had to take his medication once.  CTN advised patient of use of urgent care or physician’s 24 hr access line if assistance is needed after hours.      Plan of care updates since last contact:  Education: .       Advance Care Planning:   Does patient have an Advance Directive: reviewed during previous call, see note. .    Medication Review:  No changes since last call.     Remote Patient Monitoring:  Offered patient enrollment in the Remote Patient Monitoring (RPM) program for in-home monitoring: discuss on follow up     Assessments:  Care Transitions Subsequent and Final Call    Subsequent and Final Calls  Do you have any ongoing symptoms?: No  Have your medications changed?: No  Do you have any questions related to your medications?: No  Do you currently have any active services?: No  Do you have any needs or concerns that I can assist you with?: No  Identified Barriers: None  Care Transitions Interventions  Other Interventions:              Follow Up Appointment:   Reviewed upcoming appointment(s).  Future Appointments         Provider Specialty Dept Phone    2024 10:00 AM Yung Begum DO Internal Medicine [phone]            Care Transition Nurse provided contact

## 2024-12-10 ENCOUNTER — CARE COORDINATION (OUTPATIENT)
Dept: CASE MANAGEMENT | Age: 69
End: 2024-12-10

## 2024-12-10 NOTE — CARE COORDINATION
Care Transitions Note    Follow Up Call     Attempted to reach patient for transitions of care follow up.  Unable to reach patient.      Outreach Attempts:   HIPAA compliant voicemail left for patient.     Care Summary Note: LVM     Follow Up Appointment:   Future Appointments         Provider Specialty Dept Phone    12/20/2024 10:00 AM Yung Begum  Internal Medicine [phone]            Plan for follow-up call in 6-10 days based on severity of symptoms and risk factors. Plan for next call: self management-.     Fifi Srivastava, MSN, RN, [facility]  Care Transition Nurse  [phone] mobile

## 2024-12-14 NOTE — PROGRESS NOTES
light chains did show elevations.  SPEP shows a small M spike unchanged from SPEP 11/9/2024.  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  Was evaluated by [facility] but was several years ago in 2020 or 2021 per patient.  At that time he was only mildly anemic, without this diagnosis of renal failure.  Considering the results of the UPEP, will ask for heme-onc evaluation and recommendation.    - CBC with Auto Differential; Future  - [facility] - Jarrod Buckner MD, Hematology/Oncology, [facility]    2. Anemia, unspecified type    Hemoglobin stable at 8.5.  Hematology oncology referral is pending.  Continue to monitor hemoglobin.  GI evaluation does not show any clear reason for his anemia.  Renal disease certainly contributes, but renal disease did not start until after his anemia.  Anemia acutely worsened from 8/20/2023 to 11/20/2023 with hemoglobin drop of 12 to 8.    - CBC with Auto Differential; Future    3. Essential (primary) hypertension    Last visit BP in office was borderline soft.  It has since rebounded, is controlled amlodipine 5 mg daily.  Continue the same.    - Basic Metabolic Panel; Future    4. TONG (acute kidney injury) (HCC)    Slowly improving.  Repeat lab next month to check creatinine as well as serum electrolytes.    - Basic Metabolic Panel; Future

## 2024-12-17 ENCOUNTER — CARE COORDINATION (OUTPATIENT)
Dept: CASE MANAGEMENT | Age: 69
End: 2024-12-17

## 2024-12-17 NOTE — CARE COORDINATION
Care Transitions Note    Final Call     Patient Current Location:  [place]    Care Transition Nurse contacted the patient by telephone. Verified name and  as identifiers.    Patient graduated from the Care Transitions program on 24.  Patient/family has the ability to self manage at this time..      Advance Care Planning:   Does patient have an Advance Directive: reviewed during previous call, see note. .    Handoff:   Patient was not referred to the ACM team due to no additional needs identified.       Care Summary Note: CTN spoke with patient who reported he is doing pretty good and feels things are heading in the right direction. Patient will have stents removed by urology and follow up apt with PCP on . Patient reported his BP has been stable and he reported he will continue to monitor. CTN advised patient of use of urgent care or physician’s 24 hr access line if assistance is needed after hours.    CTN offered RPM and patient declined at this time.            Assessments:  Care Transitions Subsequent and Final Call    Subsequent and Final Calls  Do you have any ongoing symptoms?: No  Have your medications changed?: No  Do you have any questions related to your medications?: No  Do you currently have any active services?: No  Do you have any needs or concerns that I can assist you with?: No  Identified Barriers: None  Care Transitions Interventions  Other Interventions:              Upcoming Appointments:    Future Appointments         Provider Specialty Dept Phone    2024 10:00 AM Yung Begum, DO Internal Medicine [phone]            Patient has agreed to contact primary care provider and/or specialist for any further questions, concerns, or needs.    Fifi Srivastava, MSN, RN, [facility]  Care Transition Nurse  [phone] mobile

## 2024-12-19 SDOH — HEALTH STABILITY: PHYSICAL HEALTH: ON AVERAGE, HOW MANY MINUTES DO YOU ENGAGE IN EXERCISE AT THIS LEVEL?: 40 MIN

## 2024-12-19 SDOH — HEALTH STABILITY: PHYSICAL HEALTH: ON AVERAGE, HOW MANY DAYS PER WEEK DO YOU ENGAGE IN MODERATE TO STRENUOUS EXERCISE (LIKE A BRISK WALK)?: 6 DAYS

## 2024-12-20 ENCOUNTER — OFFICE VISIT (OUTPATIENT)
Dept: INTERNAL MEDICINE CLINIC | Age: 69
End: 2024-12-20

## 2024-12-20 VITALS
WEIGHT: 236 LBS | HEART RATE: 79 BPM | SYSTOLIC BLOOD PRESSURE: 114 MMHG | HEIGHT: 72 IN | BODY MASS INDEX: 31.97 KG/M2 | OXYGEN SATURATION: 99 % | DIASTOLIC BLOOD PRESSURE: 69 MMHG

## 2024-12-20 DIAGNOSIS — D64.9 ANEMIA, UNSPECIFIED TYPE: ICD-10-CM

## 2024-12-20 DIAGNOSIS — R80.3 BENCE JONES PROTEINURIA: Primary | ICD-10-CM

## 2024-12-20 DIAGNOSIS — I10 ESSENTIAL (PRIMARY) HYPERTENSION: ICD-10-CM

## 2024-12-20 DIAGNOSIS — N17.9 AKI (ACUTE KIDNEY INJURY) (HCC): ICD-10-CM

## 2024-12-20 PROBLEM — N13.30 BILATERAL HYDRONEPHROSIS: Status: RESOLVED | Noted: 2024-11-10 | Resolved: 2024-12-20

## 2024-12-20 ASSESSMENT — ANXIETY QUESTIONNAIRES
3. WORRYING TOO MUCH ABOUT DIFFERENT THINGS: NOT AT ALL
6. BECOMING EASILY ANNOYED OR IRRITABLE: NOT AT ALL
2. NOT BEING ABLE TO STOP OR CONTROL WORRYING: NOT AT ALL
4. TROUBLE RELAXING: NOT AT ALL
1. FEELING NERVOUS, ANXIOUS, OR ON EDGE: NOT AT ALL
5. BEING SO RESTLESS THAT IT IS HARD TO SIT STILL: NOT AT ALL
GAD7 TOTAL SCORE: 0
7. FEELING AFRAID AS IF SOMETHING AWFUL MIGHT HAPPEN: NOT AT ALL
IF YOU CHECKED OFF ANY PROBLEMS ON THIS QUESTIONNAIRE, HOW DIFFICULT HAVE THESE PROBLEMS MADE IT FOR YOU TO DO YOUR WORK, TAKE CARE OF THINGS AT HOME, OR GET ALONG WITH OTHER PEOPLE: NOT DIFFICULT AT ALL

## 2024-12-20 ASSESSMENT — PATIENT HEALTH QUESTIONNAIRE - PHQ9
SUM OF ALL RESPONSES TO PHQ QUESTIONS 1-9: 0
1. LITTLE INTEREST OR PLEASURE IN DOING THINGS: NOT AT ALL
SUM OF ALL RESPONSES TO PHQ QUESTIONS 1-9: 0
SUM OF ALL RESPONSES TO PHQ9 QUESTIONS 1 & 2: 0
2. FEELING DOWN, DEPRESSED OR HOPELESS: NOT AT ALL

## 2024-12-20 NOTE — PATIENT INSTRUCTIONS
-Referral to [place] to review the most recent results on immunofixation studies  -Obtain blood work first week of January to monitor kidney function, electrolytes as well as your anemia  -Continue with all medicines as prescribed  -Follow-up with nephrology as scheduled in February  -Flu shot today  -Stent removal today per urology  -Follow-up in our office in 3 months

## 2024-12-30 DIAGNOSIS — R80.3 BENCE JONES PROTEINURIA: ICD-10-CM

## 2024-12-30 DIAGNOSIS — D64.9 ANEMIA, UNSPECIFIED TYPE: ICD-10-CM

## 2024-12-30 DIAGNOSIS — N17.9 AKI (ACUTE KIDNEY INJURY) (HCC): ICD-10-CM

## 2024-12-30 DIAGNOSIS — I10 ESSENTIAL (PRIMARY) HYPERTENSION: ICD-10-CM

## 2024-12-30 LAB
ANION GAP SERPL CALCULATED.3IONS-SCNC: 11 MMOL/L (ref 3–16)
BASOPHILS # BLD: 0 K/UL (ref 0–0.2)
BASOPHILS NFR BLD: 0.4 %
BUN SERPL-MCNC: 56 MG/DL (ref 7–20)
CALCIUM SERPL-MCNC: 9.1 MG/DL (ref 8.3–10.6)
CHLORIDE SERPL-SCNC: 107 MMOL/L (ref 99–110)
CO2 SERPL-SCNC: 17 MMOL/L (ref 21–32)
CREAT SERPL-MCNC: 3.1 MG/DL (ref 0.8–1.3)
DEPRECATED RDW RBC AUTO: 17.1 % (ref 12.4–15.4)
EOSINOPHIL # BLD: 0.2 K/UL (ref 0–0.6)
EOSINOPHIL NFR BLD: 3.9 %
GFR SERPLBLD CREATININE-BSD FMLA CKD-EPI: 21 ML/MIN/{1.73_M2}
GLUCOSE SERPL-MCNC: 149 MG/DL (ref 70–99)
HCT VFR BLD AUTO: 27.2 % (ref 40.5–52.5)
HGB BLD-MCNC: 9.2 G/DL (ref 13.5–17.5)
LYMPHOCYTES # BLD: 1.1 K/UL (ref 1–5.1)
LYMPHOCYTES NFR BLD: 20.7 %
MCH RBC QN AUTO: 35 PG (ref 26–34)
MCHC RBC AUTO-ENTMCNC: 33.7 G/DL (ref 31–36)
MCV RBC AUTO: 103.8 FL (ref 80–100)
MONOCYTES # BLD: 0.5 K/UL (ref 0–1.3)
MONOCYTES NFR BLD: 8.6 %
NEUTROPHILS # BLD: 3.5 K/UL (ref 1.7–7.7)
NEUTROPHILS NFR BLD: 66.4 %
PLATELET # BLD AUTO: 230 K/UL (ref 135–450)
PMV BLD AUTO: 7.6 FL (ref 5–10.5)
POTASSIUM SERPL-SCNC: 5.8 MMOL/L (ref 3.5–5.1)
RBC # BLD AUTO: 2.62 M/UL (ref 4.2–5.9)
SODIUM SERPL-SCNC: 135 MMOL/L (ref 136–145)
WBC # BLD AUTO: 5.3 K/UL (ref 4–11)

## 2024-12-31 LAB
ANION GAP SERPL CALCULATED.3IONS-SCNC: 11 MMOL/L (ref 3–16)
BUN SERPL-MCNC: 50 MG/DL (ref 7–20)
CALCIUM SERPL-MCNC: 9.2 MG/DL (ref 8.3–10.6)
CHLORIDE SERPL-SCNC: 110 MMOL/L (ref 99–110)
CO2 SERPL-SCNC: 17 MMOL/L (ref 21–32)
CREAT SERPL-MCNC: 2.8 MG/DL (ref 0.8–1.3)
GFR SERPLBLD CREATININE-BSD FMLA CKD-EPI: 23 ML/MIN/{1.73_M2}
GLUCOSE SERPL-MCNC: 131 MG/DL (ref 70–99)
POTASSIUM SERPL-SCNC: 5.3 MMOL/L (ref 3.5–5.1)
SODIUM SERPL-SCNC: 138 MMOL/L (ref 136–145)

## 2025-01-14 ENCOUNTER — HOSPITAL ENCOUNTER (OUTPATIENT)
Dept: ULTRASOUND IMAGING | Age: 70
Discharge: HOME | End: 2025-01-14
Payer: MEDICARE

## 2025-01-14 DIAGNOSIS — Z96.0 PRESENCE UROGENITAL IMPLANT: ICD-10-CM

## 2025-01-14 DIAGNOSIS — N40.1 BENIGN PROSTATIC HYPERPLASIA WITH LOWER URINARY TRACT SYMPTOMS, SYMPTOM DETAILS UNSPECIFIED: ICD-10-CM

## 2025-01-14 DIAGNOSIS — R33.9 RETENTION OF URINE, UNSPECIFIED: ICD-10-CM

## 2025-01-14 DIAGNOSIS — N13.39 OTHER HYDRONEPHROSIS: ICD-10-CM

## 2025-01-14 PROCEDURE — 76770 US EXAM ABDO BACK WALL COMP: CPT

## 2025-02-17 ENCOUNTER — HOSPITAL ENCOUNTER (OUTPATIENT)
Dept: NUCLEAR MEDICINE | Age: 70
Discharge: HOME | End: 2025-02-17
Attending: UROLOGY
Payer: MEDICARE

## 2025-02-17 DIAGNOSIS — Z96.0 PRESENCE OF UROGENITAL IMPLANT: ICD-10-CM

## 2025-02-17 DIAGNOSIS — N13.8 ENLARGED PROSTATE WITH URINARY OBSTRUCTION: ICD-10-CM

## 2025-02-17 DIAGNOSIS — N13.39 OTHER HYDRONEPHROSIS: ICD-10-CM

## 2025-02-17 DIAGNOSIS — N40.1 ENLARGED PROSTATE WITH URINARY OBSTRUCTION: ICD-10-CM

## 2025-02-17 DIAGNOSIS — R33.9 RETENTION OF URINE, UNSPECIFIED: ICD-10-CM

## 2025-02-17 PROCEDURE — 3430000000 HC RX DIAGNOSTIC RADIOPHARMACEUTICAL: Performed by: UROLOGY

## 2025-02-17 PROCEDURE — 6360000002 HC RX W HCPCS: Performed by: UROLOGY

## 2025-02-17 PROCEDURE — A9562 TC99M MERTIATIDE: HCPCS | Performed by: UROLOGY

## 2025-02-17 PROCEDURE — 78708 K FLOW/FUNCT IMAGE W/DRUG: CPT

## 2025-02-17 RX ORDER — FUROSEMIDE 10 MG/ML
40 INJECTION INTRAMUSCULAR; INTRAVENOUS ONCE
Status: COMPLETED | OUTPATIENT
Start: 2025-02-17 | End: 2025-02-17

## 2025-02-17 RX ADMIN — FUROSEMIDE 40 MG: 10 INJECTION, SOLUTION INTRAMUSCULAR; INTRAVENOUS at 11:58

## 2025-02-17 RX ADMIN — Medication 15.4 MILLICURIE: at 11:58

## 2025-03-18 ASSESSMENT — PATIENT HEALTH QUESTIONNAIRE - PHQ9
1. LITTLE INTEREST OR PLEASURE IN DOING THINGS: NOT AT ALL
SUM OF ALL RESPONSES TO PHQ9 QUESTIONS 1 & 2: 0
SUM OF ALL RESPONSES TO PHQ QUESTIONS 1-9: 0
2. FEELING DOWN, DEPRESSED OR HOPELESS: NOT AT ALL
2. FEELING DOWN, DEPRESSED OR HOPELESS: NOT AT ALL
1. LITTLE INTEREST OR PLEASURE IN DOING THINGS: NOT AT ALL
SUM OF ALL RESPONSES TO PHQ QUESTIONS 1-9: 0

## 2025-03-18 NOTE — PROGRESS NOTES
Mother     Heart Surgery Brother         Septal defect repair        Allergies   Allergen Reactions    Valsartan     Cipro Xr Rash    Ciprofloxacin Rash       Immunization History   Administered Date(s) Administered    COVID-19, PFIZER GRAY top, DO NOT Dilute, (age 12 y+), IM, 30 mcg/0.3 mL 07/11/2022    COVID-19, PFIZER PURPLE top, DILUTE for use, (age 12 y+), 30mcg/0.3mL 03/02/2021, 03/23/2021, 11/22/2021    Influenza 12/03/2012    Influenza Virus Vaccine 10/04/2011, 10/13/2014, 11/02/2015    Influenza, AFLURIA (age 3 y+), FLUZONE, (age 6 mo+), Quadv MDV, 0.5mL 10/21/2016, 10/24/2017    Influenza, FLUAD, (age 65 y+), IM, Quadv, 0.5mL 09/29/2020, 11/16/2021, 11/21/2022, 12/18/2023    Influenza, FLUAD, (age 65 y+), IM, Trivalent PF, 0.5mL 09/11/2019, 12/20/2024    Influenza, FLUARIX, FLULAVAL, FLUZONE (age 6 mo+) and AFLURIA, (age 3 y+), Quadv PF, 0.5mL 01/07/2019    Pneumococcal, PCV-13, PREVNAR 13, (age 6w+), IM, 0.5mL 09/11/2019    Pneumococcal, PPSV23, PNEUMOVAX 23, (age 2y+), SC/IM, 0.5mL 09/29/2020    TDaP, ADACEL (age 10y-64y), BOOSTRIX (age 10y+), IM, 0.5mL 04/01/2009, 09/11/2019    Zoster Recombinant (Shingrix) 11/06/2020, 01/08/2021       Current Outpatient Medications   Medication Sig Dispense Refill    amLODIPine (NORVASC) 5 MG tablet Take 1 tablet by mouth daily (Patient taking differently: Take 2 tablets by mouth daily) 30 tablet 3    tamsulosin (FLOMAX) 0.4 MG capsule Take 1 capsule by mouth daily 30 capsule 3    sodium bicarbonate 650 MG tablet Take 1 tablet by mouth 2 times daily 30 tablet 1    Levomefolate Glucosamine (METHYL-FOLATE) 1700 MCG CAPS Take by mouth      azelastine HCl 0.15 % SOLN       Cyanocobalamin (VITAMIN B-12) 2500 MCG SUBL Place 1 tablet under the tongue once Takes once a week.      vitamin D (CHOLECALCIFEROL) 1000 UNIT TABS tablet Take 1 tablet by mouth daily      Multiple Vitamins-Minerals (THERAPEUTIC MULTIVITAMIN-MINERALS) tablet Take 1 tablet by mouth daily       No current

## 2025-03-21 ENCOUNTER — OFFICE VISIT (OUTPATIENT)
Dept: INTERNAL MEDICINE CLINIC | Age: 70
End: 2025-03-21
Payer: MEDICARE

## 2025-03-21 VITALS
WEIGHT: 253 LBS | DIASTOLIC BLOOD PRESSURE: 82 MMHG | SYSTOLIC BLOOD PRESSURE: 138 MMHG | HEART RATE: 75 BPM | BODY MASS INDEX: 34.31 KG/M2 | OXYGEN SATURATION: 100 %

## 2025-03-21 DIAGNOSIS — I10 ESSENTIAL (PRIMARY) HYPERTENSION: ICD-10-CM

## 2025-03-21 DIAGNOSIS — N18.9 CHRONIC KIDNEY DISEASE, UNSPECIFIED CKD STAGE: Primary | ICD-10-CM

## 2025-03-21 DIAGNOSIS — D64.9 ANEMIA, UNSPECIFIED TYPE: ICD-10-CM

## 2025-03-21 DIAGNOSIS — G47.33 OSA ON CPAP: ICD-10-CM

## 2025-03-21 PROCEDURE — 99214 OFFICE O/P EST MOD 30 MIN: CPT

## 2025-03-21 PROCEDURE — 1036F TOBACCO NON-USER: CPT

## 2025-03-21 PROCEDURE — 3017F COLORECTAL CA SCREEN DOC REV: CPT

## 2025-03-21 PROCEDURE — 3075F SYST BP GE 130 - 139MM HG: CPT

## 2025-03-21 PROCEDURE — 3079F DIAST BP 80-89 MM HG: CPT

## 2025-03-21 PROCEDURE — G8417 CALC BMI ABV UP PARAM F/U: HCPCS

## 2025-03-21 PROCEDURE — 1159F MED LIST DOCD IN RCRD: CPT

## 2025-03-21 PROCEDURE — 1124F ACP DISCUSS-NO DSCNMKR DOCD: CPT

## 2025-03-21 PROCEDURE — G8427 DOCREV CUR MEDS BY ELIG CLIN: HCPCS

## 2025-04-03 ENCOUNTER — OFFICE VISIT (OUTPATIENT)
Dept: SLEEP MEDICINE | Age: 70
End: 2025-04-03
Payer: MEDICARE

## 2025-04-03 VITALS
SYSTOLIC BLOOD PRESSURE: 122 MMHG | DIASTOLIC BLOOD PRESSURE: 74 MMHG | OXYGEN SATURATION: 98 % | BODY MASS INDEX: 34.38 KG/M2 | HEART RATE: 67 BPM | HEIGHT: 72 IN | WEIGHT: 253.8 LBS

## 2025-04-03 DIAGNOSIS — I10 ESSENTIAL (PRIMARY) HYPERTENSION: ICD-10-CM

## 2025-04-03 DIAGNOSIS — Z71.89 CPAP USE COUNSELING: ICD-10-CM

## 2025-04-03 DIAGNOSIS — Z72.821 POOR SLEEP HYGIENE: ICD-10-CM

## 2025-04-03 DIAGNOSIS — E66.9 OBESITY (BMI 30-39.9): ICD-10-CM

## 2025-04-03 DIAGNOSIS — G47.33 SEVERE OBSTRUCTIVE SLEEP APNEA: Primary | ICD-10-CM

## 2025-04-03 DIAGNOSIS — F51.04 PSYCHOPHYSIOLOGICAL INSOMNIA: ICD-10-CM

## 2025-04-03 PROCEDURE — G8417 CALC BMI ABV UP PARAM F/U: HCPCS | Performed by: NURSE PRACTITIONER

## 2025-04-03 PROCEDURE — 1159F MED LIST DOCD IN RCRD: CPT | Performed by: NURSE PRACTITIONER

## 2025-04-03 PROCEDURE — 99204 OFFICE O/P NEW MOD 45 MIN: CPT | Performed by: NURSE PRACTITIONER

## 2025-04-03 PROCEDURE — 3078F DIAST BP <80 MM HG: CPT | Performed by: NURSE PRACTITIONER

## 2025-04-03 PROCEDURE — G8427 DOCREV CUR MEDS BY ELIG CLIN: HCPCS | Performed by: NURSE PRACTITIONER

## 2025-04-03 PROCEDURE — 1036F TOBACCO NON-USER: CPT | Performed by: NURSE PRACTITIONER

## 2025-04-03 PROCEDURE — 1124F ACP DISCUSS-NO DSCNMKR DOCD: CPT | Performed by: NURSE PRACTITIONER

## 2025-04-03 PROCEDURE — 1160F RVW MEDS BY RX/DR IN RCRD: CPT | Performed by: NURSE PRACTITIONER

## 2025-04-03 PROCEDURE — 3017F COLORECTAL CA SCREEN DOC REV: CPT | Performed by: NURSE PRACTITIONER

## 2025-04-03 PROCEDURE — 3074F SYST BP LT 130 MM HG: CPT | Performed by: NURSE PRACTITIONER

## 2025-04-03 RX ORDER — METOPROLOL SUCCINATE 25 MG/1
TABLET, EXTENDED RELEASE ORAL
COMMUNITY
Start: 2025-03-25

## 2025-04-03 ASSESSMENT — SLEEP AND FATIGUE QUESTIONNAIRES
HOW LIKELY ARE YOU TO NOD OFF OR FALL ASLEEP WHILE SITTING AND TALKING TO SOMEONE: WOULD NEVER DOZE
HOW LIKELY ARE YOU TO NOD OFF OR FALL ASLEEP WHILE LYING DOWN TO REST IN THE AFTERNOON WHEN CIRCUMSTANCES PERMIT: SLIGHT CHANCE OF DOZING
HOW LIKELY ARE YOU TO NOD OFF OR FALL ASLEEP IN A CAR, WHILE STOPPED FOR A FEW MINUTES IN TRAFFIC: WOULD NEVER DOZE
HOW LIKELY ARE YOU TO NOD OFF OR FALL ASLEEP WHILE SITTING INACTIVE IN A PUBLIC PLACE: WOULD NEVER DOZE
HOW LIKELY ARE YOU TO NOD OFF OR FALL ASLEEP WHILE SITTING AND READING: SLIGHT CHANCE OF DOZING
HOW LIKELY ARE YOU TO NOD OFF OR FALL ASLEEP WHILE WATCHING TV: WOULD NEVER DOZE
HOW LIKELY ARE YOU TO NOD OFF OR FALL ASLEEP WHILE SITTING QUIETLY AFTER LUNCH WITHOUT ALCOHOL: SLIGHT CHANCE OF DOZING
HOW LIKELY ARE YOU TO NOD OFF OR FALL ASLEEP WHEN YOU ARE A PASSENGER IN A CAR FOR AN HOUR WITHOUT A BREAK: WOULD NEVER DOZE
ESS TOTAL SCORE: 3

## 2025-04-03 NOTE — PATIENT INSTRUCTIONS
[facility] - [phone] OR  [email or web address]       Here are some tips to to getting better sleep  1- Avoid napping during the day: This will ensure you are tired at bedtime. If you have to take a nap, sleep less than one hour, before 3 pm.   2- Exercise regularly, but not right before bed: but the timing of the workout is important. Exercising in the morning or early afternoon will not interfere with sleep.  Exercising within two hours before bedtime can decrease your ability to fall asleep.  Regular exercise is recommended to help you deepen the sleep.   3- Avoid heavy, spicy, or sugary foods 4-6 hours before bedtime: These can affect your ability to stay asleep.  4- Have a light snack before bed: Having an empty stomach can interfere with your sleep. Dairy products and turkey contain tryptophan, which acts as a natural sleep inducer.   5- Stay away from caffeine, nicotine and alcohol at least 4-6 hours before bed: Caffeine and nicotine are stimulants that interfere with your ability to fall asleep. While alcohol has an immediate sleep-inducing effect, a few hours later, as alcohol levels in your blood start to fall, there is a stimulant effect and you will experience fragmented sleep.   6- Take a hot bath 90 minutes before bedtime:  A hot bath will raise your body temperature, but it is the drop in body temperature that may leave you feeling sleepy  7- Develop sleep rituals: it is important to give your body cues that it is time to slow down and sleep. Listen to relaxing music, read something soothing for 15 minutes, have a cup of caffeine free tea, or do relaxation exercises such as yoga or deep breathing help relieve anxiety and reduce muscle tension.   8- Fix a bedtime and an awakening time: Even on weekends! When your sleep cycle has a regular rhythm, you will feel better.   9- Sleep only when sleepy: This reduces the time you are awake in bed.   10- Get

## 2025-04-07 ENCOUNTER — PATIENT MESSAGE (OUTPATIENT)
Dept: INTERNAL MEDICINE CLINIC | Age: 70
End: 2025-04-07

## 2025-04-07 DIAGNOSIS — N18.9 CHRONIC KIDNEY DISEASE, UNSPECIFIED CKD STAGE: Primary | ICD-10-CM

## 2025-04-10 DIAGNOSIS — N18.9 CHRONIC KIDNEY DISEASE, UNSPECIFIED CKD STAGE: ICD-10-CM

## 2025-04-10 LAB
ANION GAP SERPL CALCULATED.3IONS-SCNC: 7 MMOL/L (ref 3–16)
BUN SERPL-MCNC: 32 MG/DL (ref 7–20)
CALCIUM SERPL-MCNC: 8.8 MG/DL (ref 8.3–10.6)
CHLORIDE SERPL-SCNC: 109 MMOL/L (ref 99–110)
CO2 SERPL-SCNC: 22 MMOL/L (ref 21–32)
CREAT SERPL-MCNC: 2.3 MG/DL (ref 0.8–1.3)
GFR SERPLBLD CREATININE-BSD FMLA CKD-EPI: 30 ML/MIN/{1.73_M2}
GLUCOSE SERPL-MCNC: 178 MG/DL (ref 70–99)
POTASSIUM SERPL-SCNC: 5.4 MMOL/L (ref 3.5–5.1)
SODIUM SERPL-SCNC: 138 MMOL/L (ref 136–145)

## 2025-06-18 ENCOUNTER — OFFICE VISIT (OUTPATIENT)
Dept: SLEEP MEDICINE | Age: 70
End: 2025-06-18
Payer: MEDICARE

## 2025-06-18 VITALS
HEIGHT: 72 IN | OXYGEN SATURATION: 98 % | HEART RATE: 65 BPM | SYSTOLIC BLOOD PRESSURE: 138 MMHG | WEIGHT: 260.6 LBS | DIASTOLIC BLOOD PRESSURE: 64 MMHG | BODY MASS INDEX: 35.3 KG/M2

## 2025-06-18 DIAGNOSIS — I10 ESSENTIAL (PRIMARY) HYPERTENSION: ICD-10-CM

## 2025-06-18 DIAGNOSIS — G47.33 SEVERE OBSTRUCTIVE SLEEP APNEA: Primary | ICD-10-CM

## 2025-06-18 DIAGNOSIS — Z71.89 CPAP USE COUNSELING: ICD-10-CM

## 2025-06-18 DIAGNOSIS — E66.9 OBESITY (BMI 30-39.9): ICD-10-CM

## 2025-06-18 PROCEDURE — 3078F DIAST BP <80 MM HG: CPT | Performed by: NURSE PRACTITIONER

## 2025-06-18 PROCEDURE — G8427 DOCREV CUR MEDS BY ELIG CLIN: HCPCS | Performed by: NURSE PRACTITIONER

## 2025-06-18 PROCEDURE — 3075F SYST BP GE 130 - 139MM HG: CPT | Performed by: NURSE PRACTITIONER

## 2025-06-18 PROCEDURE — 1160F RVW MEDS BY RX/DR IN RCRD: CPT | Performed by: NURSE PRACTITIONER

## 2025-06-18 PROCEDURE — 3017F COLORECTAL CA SCREEN DOC REV: CPT | Performed by: NURSE PRACTITIONER

## 2025-06-18 PROCEDURE — G8417 CALC BMI ABV UP PARAM F/U: HCPCS | Performed by: NURSE PRACTITIONER

## 2025-06-18 PROCEDURE — G2211 COMPLEX E/M VISIT ADD ON: HCPCS | Performed by: NURSE PRACTITIONER

## 2025-06-18 PROCEDURE — 1124F ACP DISCUSS-NO DSCNMKR DOCD: CPT | Performed by: NURSE PRACTITIONER

## 2025-06-18 PROCEDURE — 1036F TOBACCO NON-USER: CPT | Performed by: NURSE PRACTITIONER

## 2025-06-18 PROCEDURE — 99214 OFFICE O/P EST MOD 30 MIN: CPT | Performed by: NURSE PRACTITIONER

## 2025-06-18 PROCEDURE — 1159F MED LIST DOCD IN RCRD: CPT | Performed by: NURSE PRACTITIONER

## 2025-06-18 ASSESSMENT — SLEEP AND FATIGUE QUESTIONNAIRES
HOW LIKELY ARE YOU TO NOD OFF OR FALL ASLEEP WHILE LYING DOWN TO REST IN THE AFTERNOON WHEN CIRCUMSTANCES PERMIT: WOULD NEVER DOZE
ESS TOTAL SCORE: 2
HOW LIKELY ARE YOU TO NOD OFF OR FALL ASLEEP IN A CAR, WHILE STOPPED FOR A FEW MINUTES IN TRAFFIC: WOULD NEVER DOZE
HOW LIKELY ARE YOU TO NOD OFF OR FALL ASLEEP WHILE SITTING AND TALKING TO SOMEONE: WOULD NEVER DOZE
HOW LIKELY ARE YOU TO NOD OFF OR FALL ASLEEP WHEN YOU ARE A PASSENGER IN A CAR FOR AN HOUR WITHOUT A BREAK: WOULD NEVER DOZE
HOW LIKELY ARE YOU TO NOD OFF OR FALL ASLEEP WHILE WATCHING TV: SLIGHT CHANCE OF DOZING
HOW LIKELY ARE YOU TO NOD OFF OR FALL ASLEEP WHILE SITTING AND READING: SLIGHT CHANCE OF DOZING
HOW LIKELY ARE YOU TO NOD OFF OR FALL ASLEEP WHILE SITTING QUIETLY AFTER LUNCH WITHOUT ALCOHOL: WOULD NEVER DOZE
HOW LIKELY ARE YOU TO NOD OFF OR FALL ASLEEP WHILE SITTING INACTIVE IN A PUBLIC PLACE: WOULD NEVER DOZE

## 2025-06-18 NOTE — PROGRESS NOTES
CHIEF COMPLAINT: Sleep apnea follow up    Consulting provider: Yung Begum DO    Tylor Cochran is a 70 y.o. male in office for DAQUAN follow up.  States he is doing well with new CPAP.  Patient is using CPAP   6 hrs/night. Using humidifier. No snoring on CPAP. The pressure is well tolerated. The mask is comfortable-  partial full face. No mask leak. No significant daytime sleepiness. No nodding off when driving. No dry nose or throat. Some fatigue. Bedtime is 9 pm and rise time is 3-330 am- dogs wake him. Sleep onset is few-15 minutes. Wakes up 1 times at night total. 1 nocturia. It takes few minutes to fall back a sleep. No naps during the day. No headache in am. No weight gain. 4 caffienated beverages during the day. No alcohol. ESS is 2        Previous HPI 12/13/2017  Tylor Cochran is a 70 y.o. male in office for DAQUAN follow up.  States doing pretty good with CPAP.  Patient is using CPAP 6-7 hrs/night. Using humidifier. No snoring on CPAP. The pressure is well tolerated. The mask is comfortable-adrian view- full face. No mask leak. No significant daytime sleepiness. No nodding off when driving. No dry nose or throat. No fatigue. Bedtime is 9-10 pm and rise time is 430-5 am. Sleep onset is 20 minutes. Wakes up 1-2 times at night total. 1-2 nocturia. It takes usually few minutes to fall back a sleep. Rare naps during the day. No headache in am. No weight gain, -30 lbs since last year, swimming and eating better. 2-3 caffienated beverages during the day. Occassional alcohol. ESS is 3    Compliance download report from 11/13/17 to 12/12/17 reviewed today by me and showed patient is using machine 6:36 hrs/night with 96.7% compliance and AHI 1.0 within this time frame.  29/30days with greater than 4 hours of machine use.  90% pressure 10.9 cm H20    Past Medical History:   Diagnosis Date    Acute deep vein thrombosis (DVT) of left lower extremity (HCC) 10/21/2016    TONG (acute kidney injury) 11/08/2024    Allergic

## 2025-06-20 ENCOUNTER — OFFICE VISIT (OUTPATIENT)
Dept: INTERNAL MEDICINE CLINIC | Age: 70
End: 2025-06-20

## 2025-06-20 VITALS
SYSTOLIC BLOOD PRESSURE: 136 MMHG | OXYGEN SATURATION: 100 % | WEIGHT: 257 LBS | BODY MASS INDEX: 34.86 KG/M2 | DIASTOLIC BLOOD PRESSURE: 76 MMHG | HEART RATE: 62 BPM

## 2025-06-20 DIAGNOSIS — Z86.718 HISTORY OF VENOUS THROMBOSIS AND EMBOLISM: ICD-10-CM

## 2025-06-20 DIAGNOSIS — I10 ESSENTIAL (PRIMARY) HYPERTENSION: ICD-10-CM

## 2025-06-20 DIAGNOSIS — R73.03 PREDIABETES: ICD-10-CM

## 2025-06-20 DIAGNOSIS — N18.9 CHRONIC KIDNEY DISEASE, UNSPECIFIED CKD STAGE: Primary | ICD-10-CM

## 2025-06-20 DIAGNOSIS — Z13.29 SCREENING FOR THYROID DISORDER: ICD-10-CM

## 2025-06-20 DIAGNOSIS — Z13.220 SCREENING, LIPID: ICD-10-CM

## 2025-06-20 RX ORDER — PATIROMER 8.4 G/1
8.4 POWDER, FOR SUSPENSION ORAL
COMMUNITY
Start: 2025-06-20

## 2025-06-20 NOTE — PROGRESS NOTES
[facility] Office  [address]   [address]  [place], [place] [address]  Tel: [phone]    Tylor Cochran  1955  male    CC:   Chief Complaint   Patient presents with    Follow-up     3month       HPI:   Patient is a 70-year-old male who presents today for 3-month follow-up.    States overall he has been doing well.  Did see his nephrologist yesterday, had repeat blood work performed which I reviewed.  Creatinine slightly elevated, potassium up to 5.7.  Has been ISC 3 times a day, 100 200 cc per self cath.  Also starting Veltassa for hyperkalemia.  Has 6-month follow-up scheduled.  Reviewed office note as well as labs.    Otherwise has been following with a renal dietitian.  Has some questions about proper amounts of protein in his diet.  Has been following a plant-based diet for the most part, has not been doing supplementation.    States he has upcoming travel plans to [place] in September, history of DVTs from long distance travel, has questions about DVT prophylaxis.      Past Medical History:   Diagnosis Date    Acute deep vein thrombosis (DVT) of left lower extremity (HCC) 10/21/2016    TONG (acute kidney injury) 11/08/2024    Allergic rhinitis     Bilateral hydronephrosis 11/10/2024    BPH associated with nocturia     Cardiac septal defect     NO Surgery    Chronic kidney disease     Diverticulosis     DVT (deep venous thrombosis) (HCC)     History of colonic polyps     Hyperlipemia     Insomnia     Obesity     DAQUAN on CPAP     DAQUAN on CPAP 10/21/2016    Patellofemoral arthritis of left knee 08/26/2019    Plantar fasciitis     Prostatitis     Status post dilation of urethral narrowing     Type 2 diabetes mellitus without complication (HCC)     Type 2 diabetes mellitus without complication (HCC)        Past Surgical History:   Procedure Laterality Date    COLONOSCOPY  11/2005    tubular adenoma / divertics REDO 3yrs    COLONOSCOPY  11/20/2008    Hyperplastic polyp redo 5yrs    COLONOSCOPY N/A

## (undated) DEVICE — CYSTO: Brand: MEDLINE INDUSTRIES, INC.

## (undated) DEVICE — SYRINGE,TOOMEY,IRRIGATION,70CC,STERILE: Brand: MEDLINE

## (undated) DEVICE — GUIDEWIRE ENDOSCP L150CM DIA0.035IN TIP 3CM PTFE NIT

## (undated) DEVICE — GLOVE SURG SZ 65 THK91MIL LTX FREE SYN POLYISOPRENE

## (undated) DEVICE — CATHETER F BLLN 30CC 22FR 3 W SPEC HEMA LNG OPN COUDE TIP

## (undated) DEVICE — ELECTRODE ES WIDE FRONTLOADING SURF LOOP SUPERSECT